# Patient Record
Sex: FEMALE | Race: WHITE | Employment: OTHER | URBAN - METROPOLITAN AREA
[De-identification: names, ages, dates, MRNs, and addresses within clinical notes are randomized per-mention and may not be internally consistent; named-entity substitution may affect disease eponyms.]

---

## 2017-01-25 RX ORDER — EZETIMIBE 10 MG/1
TABLET ORAL
Qty: 30 TABLET | Refills: 0 | OUTPATIENT
Start: 2017-01-25

## 2017-01-25 RX ORDER — CYANOCOBALAMIN 1000 UG/ML
INJECTION INTRAMUSCULAR; SUBCUTANEOUS
COMMUNITY
Start: 2016-12-27 | End: 2017-01-26

## 2017-01-25 RX ORDER — ANASTROZOLE 1 MG/1
1 TABLET ORAL DAILY
COMMUNITY
Start: 2014-12-26 | End: 2018-12-21 | Stop reason: ALTCHOICE

## 2017-01-25 RX ORDER — MULTIVITAMIN
CAPSULE ORAL
COMMUNITY
Start: 2014-11-20 | End: 2017-01-27

## 2017-01-25 RX ORDER — EZETIMIBE 10 MG/1
10 TABLET ORAL NIGHTLY
Qty: 30 TABLET | Refills: 0 | Status: SHIPPED | OUTPATIENT
Start: 2017-01-25 | End: 2017-02-18

## 2017-01-25 RX ORDER — ASPIRIN 81 MG/1
81 TABLET, CHEWABLE ORAL DAILY
Status: ON HOLD | COMMUNITY
Start: 2015-01-05 | End: 2019-05-07

## 2017-01-25 RX ORDER — EZETIMIBE 10 MG/1
TABLET ORAL
Refills: 0 | COMMUNITY
Start: 2016-12-26 | End: 2017-01-27

## 2017-01-25 RX ORDER — CHOLECALCIFEROL (VITAMIN D3) 25 MCG
TABLET,CHEWABLE ORAL
COMMUNITY
Start: 2015-06-12 | End: 2017-01-28

## 2017-01-25 RX ORDER — EZETIMIBE 10 MG/1
TABLET ORAL
COMMUNITY
Start: 2014-06-23 | End: 2017-01-25

## 2017-01-25 RX ORDER — ATENOLOL 25 MG/1
TABLET ORAL
COMMUNITY
Start: 2009-07-14 | End: 2017-03-03

## 2017-01-25 RX ORDER — CHLORAL HYDRATE 500 MG
1000 CAPSULE ORAL 2 TIMES DAILY
COMMUNITY
Start: 2015-04-13

## 2017-01-25 RX ORDER — ANASTROZOLE 1 MG/1
TABLET ORAL
Refills: 3 | COMMUNITY
Start: 2016-12-19 | End: 2017-01-27

## 2017-01-25 NOTE — TELEPHONE ENCOUNTER
Please advise RF.  Tye Mota, 01/25/2017, 9:40 AM      Last Labs:  12/22/2016  Last OV:  12/21/2016  Tye Mota, 01/25/2017, 9:40 AM

## 2017-01-27 ENCOUNTER — OFFICE VISIT (OUTPATIENT)
Dept: FAMILY MEDICINE CLINIC | Facility: CLINIC | Age: 57
End: 2017-01-27

## 2017-01-27 ENCOUNTER — TELEPHONE (OUTPATIENT)
Dept: FAMILY MEDICINE CLINIC | Facility: CLINIC | Age: 57
End: 2017-01-27

## 2017-01-27 VITALS
DIASTOLIC BLOOD PRESSURE: 78 MMHG | TEMPERATURE: 98 F | SYSTOLIC BLOOD PRESSURE: 114 MMHG | HEART RATE: 92 BPM | BODY MASS INDEX: 39.63 KG/M2 | WEIGHT: 235 LBS | HEIGHT: 64.5 IN

## 2017-01-27 DIAGNOSIS — L98.9 DISORDER OF SKIN AND SUBCUTANEOUS TISSUE: ICD-10-CM

## 2017-01-27 DIAGNOSIS — J01.90 ACUTE NON-RECURRENT SINUSITIS, UNSPECIFIED LOCATION: Primary | ICD-10-CM

## 2017-01-27 PROBLEM — E53.8 VITAMIN B12 DEFICIENCY: Status: ACTIVE | Noted: 2017-01-27

## 2017-01-27 PROBLEM — Z98.890 S/P BUNIONECTOMY: Status: ACTIVE | Noted: 2017-01-27

## 2017-01-27 PROCEDURE — 99213 OFFICE O/P EST LOW 20 MIN: CPT | Performed by: FAMILY MEDICINE

## 2017-01-27 PROCEDURE — 96372 THER/PROPH/DIAG INJ SC/IM: CPT | Performed by: FAMILY MEDICINE

## 2017-01-27 RX ORDER — CYANOCOBALAMIN 1000 UG/ML
1000 INJECTION INTRAMUSCULAR; SUBCUTANEOUS ONCE
Status: COMPLETED | OUTPATIENT
Start: 2017-01-27 | End: 2017-01-27

## 2017-01-27 RX ORDER — AMOXICILLIN 500 MG/1
500 CAPSULE ORAL 3 TIMES DAILY
Qty: 21 CAPSULE | Refills: 0 | Status: SHIPPED | OUTPATIENT
Start: 2017-01-27 | End: 2017-02-03

## 2017-01-27 RX ADMIN — CYANOCOBALAMIN 1000 MCG: 1000 INJECTION INTRAMUSCULAR; SUBCUTANEOUS at 16:22:00

## 2017-01-27 NOTE — TELEPHONE ENCOUNTER
Patient is here for her B12 injection. Informed patient Dr. Jose Cruz Magdaleno will see her for her sinus infection prior.  Alpa Mackay, 01/27/2017, 3:45 PM

## 2017-01-27 NOTE — TELEPHONE ENCOUNTER
Please advise if you will be able to see patient when she is here for her B12 injection at 3:45pm. Norma Spears, 01/27/2017, 9:09 AM

## 2017-01-27 NOTE — PROGRESS NOTES
[facility] [place]  PROGRESS NOTE        HPI:   This is a 64year old female coming in for Patient presents with:  Sinus Problem: sinus presssure, sinus congestion- yellow [id number]    HPI  Pt was doing well, and had  A hug sinsu infection Known Allergies        Current Meds:    Current Outpatient Prescriptions:  Multiple Vitamin (MULTI VITAMIN DAILY OR) Take by mouth. Disp:  Rfl:    amoxicillin 500 MG Oral Cap Take 1 capsule (500 mg total) by mouth 3 (three) times daily.  Disp: 21 capsule Rf normal.       ASSESSMENT AND PLAN:   1.  Acute non-recurrent sinusitis, unspecified location  Recommend fluids rest saline nasal rinses restart amoxicillin plan to follow-up if not improving in 7-10 days call for any signs or symptoms  Meds & Refills for th Education: There are no barriers to learning. Medical education done. Outcome: Parent verbalizes understanding. Parent is notified to call with any questions, complications, allergies, or worsening or changing symptoms.   Parent is to call with any side e

## 2017-02-18 RX ORDER — EZETIMIBE 10 MG/1
TABLET ORAL
Qty: 30 TABLET | Refills: 2 | Status: SHIPPED | OUTPATIENT
Start: 2017-02-18 | End: 2017-05-20

## 2017-03-02 ENCOUNTER — NURSE ONLY (OUTPATIENT)
Dept: FAMILY MEDICINE CLINIC | Facility: CLINIC | Age: 57
End: 2017-03-02

## 2017-03-02 DIAGNOSIS — E53.8 B12 DEFICIENCY: Primary | ICD-10-CM

## 2017-03-02 PROCEDURE — 96372 THER/PROPH/DIAG INJ SC/IM: CPT | Performed by: FAMILY MEDICINE

## 2017-03-02 RX ORDER — CYANOCOBALAMIN 1000 UG/ML
1000 INJECTION INTRAMUSCULAR; SUBCUTANEOUS ONCE
Status: COMPLETED | OUTPATIENT
Start: 2017-03-02 | End: 2017-03-02

## 2017-03-02 RX ADMIN — CYANOCOBALAMIN 1000 MCG: 1000 INJECTION INTRAMUSCULAR; SUBCUTANEOUS at 15:54:00

## 2017-03-02 NOTE — PROGRESS NOTES
Patient here today for Vitamin B12 injection per Dr. Carina Esquivel standing order of monthly B12 injections  Patient tolerated injection well    Angel Mast Minor, 03/02/2017, 3:56 PM

## 2017-03-03 RX ORDER — ATENOLOL 25 MG/1
TABLET ORAL
Qty: 90 TABLET | Refills: 1 | Status: SHIPPED | OUTPATIENT
Start: 2017-03-03 | End: 2017-10-06 | Stop reason: ALTCHOICE

## 2017-04-05 ENCOUNTER — TELEPHONE (OUTPATIENT)
Dept: FAMILY MEDICINE CLINIC | Facility: CLINIC | Age: 57
End: 2017-04-05

## 2017-04-05 ENCOUNTER — NURSE ONLY (OUTPATIENT)
Dept: FAMILY MEDICINE CLINIC | Facility: CLINIC | Age: 57
End: 2017-04-05

## 2017-04-05 PROCEDURE — 96372 THER/PROPH/DIAG INJ SC/IM: CPT | Performed by: NURSE PRACTITIONER

## 2017-04-05 RX ORDER — CYANOCOBALAMIN 1000 UG/ML
1000 INJECTION INTRAMUSCULAR; SUBCUTANEOUS
Qty: 1 ML | Refills: 0 | Status: SHIPPED | OUTPATIENT
Start: 2017-04-05 | End: 2018-12-21 | Stop reason: ALTCHOICE

## 2017-04-05 RX ORDER — CYANOCOBALAMIN 1000 UG/ML
1000 INJECTION INTRAMUSCULAR; SUBCUTANEOUS ONCE
Status: COMPLETED | OUTPATIENT
Start: 2017-04-05 | End: 2017-04-05

## 2017-04-05 RX ADMIN — CYANOCOBALAMIN 1000 MCG: 1000 INJECTION INTRAMUSCULAR; SUBCUTANEOUS at 16:14:00

## 2017-04-05 NOTE — PROGRESS NOTES
B12 injection given today and patient tolerated it well, given in the left deltoid. Lot number [id number], exp.  Date  2/2018 and verify by Ilda Martinez NP.

## 2017-04-05 NOTE — TELEPHONE ENCOUNTER
Patient informed that the order for the b12 injection was entered in error to go to the pharmacy. Script cancelled at the pharmacy.

## 2017-04-25 ENCOUNTER — OFFICE VISIT (OUTPATIENT)
Dept: FAMILY MEDICINE CLINIC | Facility: CLINIC | Age: 57
End: 2017-04-25

## 2017-04-25 VITALS
DIASTOLIC BLOOD PRESSURE: 80 MMHG | HEIGHT: 64.5 IN | RESPIRATION RATE: 16 BRPM | TEMPERATURE: 98 F | SYSTOLIC BLOOD PRESSURE: 114 MMHG | WEIGHT: 241 LBS | BODY MASS INDEX: 40.65 KG/M2 | HEART RATE: 86 BPM

## 2017-04-25 DIAGNOSIS — G47.33 OBSTRUCTIVE SLEEP APNEA: Primary | ICD-10-CM

## 2017-04-25 PROBLEM — Z98.890 S/P ORIF (OPEN REDUCTION INTERNAL FIXATION) FRACTURE: Status: ACTIVE | Noted: 2017-04-25

## 2017-04-25 PROBLEM — Z87.81 S/P ORIF (OPEN REDUCTION INTERNAL FIXATION) FRACTURE: Status: ACTIVE | Noted: 2017-04-25

## 2017-04-25 PROCEDURE — 99214 OFFICE O/P EST MOD 30 MIN: CPT | Performed by: FAMILY MEDICINE

## 2017-04-25 NOTE — PROGRESS NOTES
[facility]  [facility]        HPI:   This is a 62year old female coming in for Patient presents with: Follow - Up: Sleep f/u    HPI:  Patient doing well with cpap. Has some restless legs yndrome. Not snoreing with her cpap. achieved by Raul Zhang was masters in [employer]. She has pets that include 1 cat,  calves. no horses. Raul Zhang lives with her Son, . She lives at home in [place]. She is  with 2 children. children. none is none. Pooja's Judaism is Adventism. mammogram     Benign essential hypertension     Pure hypercholesterolemia     Vitamin B12 deficiency     Malignant neoplasm of breast (HCC)     S/P ORIF (open reduction internal fixation) fracture     Status post left hip replacement     Osteoarthritis of pulses. Pulmonary/Chest: Effort normal and breath sounds normal.   Lymphadenopathy:     She has no cervical adenopathy. Neurological: She is alert and oriented to person, place, and time. She has normal reflexes. Skin: Skin is warm and dry.  She is n

## 2017-05-05 ENCOUNTER — NURSE ONLY (OUTPATIENT)
Dept: FAMILY MEDICINE CLINIC | Facility: CLINIC | Age: 57
End: 2017-05-05

## 2017-05-05 PROCEDURE — 96372 THER/PROPH/DIAG INJ SC/IM: CPT | Performed by: FAMILY MEDICINE

## 2017-05-05 RX ORDER — CYANOCOBALAMIN 1000 UG/ML
1000 INJECTION INTRAMUSCULAR; SUBCUTANEOUS ONCE
Status: COMPLETED | OUTPATIENT
Start: 2017-05-05 | End: 2017-05-05

## 2017-05-05 RX ADMIN — CYANOCOBALAMIN 1000 MCG: 1000 INJECTION INTRAMUSCULAR; SUBCUTANEOUS at 16:02:00

## 2017-05-20 NOTE — TELEPHONE ENCOUNTER
Last OV with Dr. Vance April 4/25/17  Last fasting labs done 8/16/16 in [facility] 7 Minor, 05/20/2017, 10:04 AM

## 2017-05-22 RX ORDER — EZETIMIBE 10 MG/1
TABLET ORAL
Qty: 30 TABLET | Refills: 2 | Status: SHIPPED | OUTPATIENT
Start: 2017-05-22 | End: 2017-08-17

## 2017-06-05 ENCOUNTER — NURSE ONLY (OUTPATIENT)
Dept: FAMILY MEDICINE CLINIC | Facility: CLINIC | Age: 57
End: 2017-06-05

## 2017-06-05 DIAGNOSIS — E53.8 B12 DEFICIENCY: Primary | ICD-10-CM

## 2017-06-05 PROCEDURE — 96372 THER/PROPH/DIAG INJ SC/IM: CPT | Performed by: FAMILY MEDICINE

## 2017-06-05 RX ORDER — CYANOCOBALAMIN 1000 UG/ML
1000 INJECTION INTRAMUSCULAR; SUBCUTANEOUS ONCE
Status: COMPLETED | OUTPATIENT
Start: 2017-06-05 | End: 2017-06-05

## 2017-06-05 RX ADMIN — CYANOCOBALAMIN 1000 MCG: 1000 INJECTION INTRAMUSCULAR; SUBCUTANEOUS at 15:45:00

## 2017-06-05 NOTE — PROGRESS NOTES
Patient given monthly B12 injection per standing order from Dr. Najma Cortez  Patient tolerated injection well  Patient states already has appt for next month for next injection    Kenna Patrick, 06/05/2017, 3:44 PM

## 2017-06-22 ENCOUNTER — OFFICE VISIT (OUTPATIENT)
Dept: FAMILY MEDICINE CLINIC | Facility: CLINIC | Age: 57
End: 2017-06-22

## 2017-06-22 VITALS
HEART RATE: 80 BPM | BODY MASS INDEX: 40.11 KG/M2 | HEIGHT: 64.5 IN | RESPIRATION RATE: 14 BRPM | WEIGHT: 237.81 LBS | DIASTOLIC BLOOD PRESSURE: 70 MMHG | TEMPERATURE: 99 F | SYSTOLIC BLOOD PRESSURE: 114 MMHG

## 2017-06-22 DIAGNOSIS — Z01.810 PREOPERATIVE CARDIOVASCULAR EXAMINATION: Primary | ICD-10-CM

## 2017-06-22 PROCEDURE — 85025 COMPLETE CBC W/AUTO DIFF WBC: CPT | Performed by: FAMILY MEDICINE

## 2017-06-22 PROCEDURE — 99214 OFFICE O/P EST MOD 30 MIN: CPT | Performed by: FAMILY MEDICINE

## 2017-06-22 PROCEDURE — 93000 ELECTROCARDIOGRAM COMPLETE: CPT | Performed by: FAMILY MEDICINE

## 2017-06-22 PROCEDURE — 80053 COMPREHEN METABOLIC PANEL: CPT | Performed by: FAMILY MEDICINE

## 2017-06-22 RX ORDER — L.ACIDOPH/B.ANIMALIS/B.LONGUM 15B CELL
1 CAPSULE ORAL DAILY
COMMUNITY
End: 2019-06-25

## 2017-06-22 RX ORDER — ENOXAPARIN SODIUM 100 MG/ML
40 INJECTION SUBCUTANEOUS DAILY
Status: DISCONTINUED | OUTPATIENT
Start: 2017-06-22 | End: 2017-06-28

## 2017-06-22 NOTE — PATIENT INSTRUCTIONS
Stop aspirin 7 days prior to surgery. Start Lovenox,  Stop day prior to surgery and day of surgery no lovenox. Restart Lovenox for day after surgery and then return to aspirin dialy.

## 2017-06-24 ENCOUNTER — TELEPHONE (OUTPATIENT)
Dept: FAMILY MEDICINE CLINIC | Facility: CLINIC | Age: 57
End: 2017-06-24

## 2017-06-24 DIAGNOSIS — R74.8 ELEVATED LIVER ENZYMES: Primary | ICD-10-CM

## 2017-06-24 NOTE — TELEPHONE ENCOUNTER
----- Message from Kristin Rivero MD sent at 6/24/2017  7:52 AM CDT -----  ast and ALt are elevated. Is patient using tylenol products. Avoid all alcohol. Recheck in 1 month.    If still elevated consider Liver US

## 2017-06-24 NOTE — TELEPHONE ENCOUNTER
Patient informed of the below results and recommendations. Patient states she is not taking any Tylenol products but will continue to not take any or drink alcohol. Patient will c/b to schedule a lab appt in 1 months.   Orders pended for signing if [facility]

## 2017-06-24 NOTE — PROGRESS NOTES
[facility] [place]  PROGRESS NOTE        HPI:   This is a 62year old female coming in for Patient presents with:  Physical: preoperative exam.     HPI  Patient plans to have her hammer toes reduced on the left by orthopedics.  She is overall doi REPLACEMENT SURGERY  No date: MASTECTOMY RIGHT Right  No date: REDUCTION OF LARGE BREAST Left  No date: TONSILLECTOMY  Social History:  Social History Narrative    Father is alive. Mother is alive.      The patient indicates family history of colon cancer ( 1 MG Oral Tab tab Take 1 mg by mouth daily. Disp:  Rfl:    aspirin 81 MG Oral Chew Tab Chew 81 mg by mouth daily. Disp:  Rfl:    omega-3 fatty acids 1000 MG Oral Cap Take 1,000 mg by mouth daily.    Disp:  Rfl:       Counseling given: Not Answered is oriented to person, place, and time. She appears well-developed and well-nourished. HENT:   Head: Normocephalic and atraumatic.    Right Ear: External ear normal.   Left Ear: External ear normal.   Nose: Nose normal.   Mouth/Throat: Oropharynx is clear 2  Airway Class: 2      Meds & Refills for this Visit:  No prescriptions requested or ordered in this encounter       Outcome: Parent verbalizes understanding.  Parent is notified to call with any questions, complications, allergies, or worsening or changin

## 2017-06-28 ENCOUNTER — TELEPHONE (OUTPATIENT)
Dept: FAMILY MEDICINE CLINIC | Facility: CLINIC | Age: 57
End: 2017-06-28

## 2017-06-28 RX ORDER — ENOXAPARIN SODIUM 100 MG/ML
40 INJECTION SUBCUTANEOUS DAILY
Qty: 6 SYRINGE | Refills: 0 | Status: SHIPPED | OUTPATIENT
Start: 2017-06-28 | End: 2017-06-29

## 2017-06-28 NOTE — TELEPHONE ENCOUNTER
Dr. Guerrero Goes wants patient to start lovnox but it was never faxed anywhere  Can you send prescription to [facility]

## 2017-06-28 NOTE — TELEPHONE ENCOUNTER
Lovenox entered and sent to pharmacy with directions previously provided by Dr. Cookie Ling.  Ada Glover, 06/28/17, 3:32 PM

## 2017-06-29 ENCOUNTER — TELEPHONE (OUTPATIENT)
Dept: FAMILY MEDICINE CLINIC | Facility: CLINIC | Age: 57
End: 2017-06-29

## 2017-06-29 RX ORDER — ENOXAPARIN SODIUM 100 MG/ML
40 INJECTION SUBCUTANEOUS DAILY
Qty: 1 SYRINGE | Refills: 0 | Status: SHIPPED | OUTPATIENT
Start: 2017-06-29 | End: 2017-10-06 | Stop reason: ALTCHOICE

## 2017-06-29 NOTE — TELEPHONE ENCOUNTER
Patient is leaving town for the weekend is flying to [facility] [employer] she is starting lovenox prior to surgery  Needs to take with her and she is unsure if she needs anything  Advised patient to contact the airline to see what the policy is or check web

## 2017-07-03 ENCOUNTER — TELEPHONE (OUTPATIENT)
Dept: FAMILY MEDICINE CLINIC | Facility: CLINIC | Age: 57
End: 2017-07-03

## 2017-07-03 NOTE — TELEPHONE ENCOUNTER
Called to [facility] and verified last script sent for Lovenox  Patient only needs one additional syringe of lovenox for day after her surgery  Patient previously picked up 6 syringes for the days before surgery    Nothing further needed    Mary Grace Cortes

## 2017-07-05 ENCOUNTER — NURSE ONLY (OUTPATIENT)
Dept: FAMILY MEDICINE CLINIC | Facility: CLINIC | Age: 57
End: 2017-07-05

## 2017-07-05 DIAGNOSIS — E53.8 B12 DEFICIENCY: Primary | ICD-10-CM

## 2017-07-05 PROCEDURE — 96372 THER/PROPH/DIAG INJ SC/IM: CPT | Performed by: NURSE PRACTITIONER

## 2017-07-05 RX ORDER — CYANOCOBALAMIN 1000 UG/ML
1000 INJECTION INTRAMUSCULAR; SUBCUTANEOUS ONCE
Status: COMPLETED | OUTPATIENT
Start: 2017-07-05 | End: 2017-07-05

## 2017-07-05 RX ADMIN — CYANOCOBALAMIN 1000 MCG: 1000 INJECTION INTRAMUSCULAR; SUBCUTANEOUS at 08:27:00

## 2017-08-03 ENCOUNTER — TELEPHONE (OUTPATIENT)
Dept: FAMILY MEDICINE CLINIC | Facility: CLINIC | Age: 57
End: 2017-08-03

## 2017-08-03 RX ORDER — CEFADROXIL 500 MG/1
2 CAPSULE ORAL DAILY
Refills: 0 | COMMUNITY
Start: 2017-07-25 | End: 2017-10-06 | Stop reason: ALTCHOICE

## 2017-08-03 RX ORDER — HYDROCODONE BITARTRATE AND ACETAMINOPHEN 5; 325 MG/1; MG/1
1 TABLET ORAL EVERY 6 HOURS PRN
Refills: 0 | COMMUNITY
Start: 2017-07-06 | End: 2017-10-06 | Stop reason: ALTCHOICE

## 2017-08-03 NOTE — TELEPHONE ENCOUNTER
Spoke to pharm  Patient's Atenolol is on backorder until next month  They need a substitute prescription for patient  Please advise    Jatin Patrick, 08/03/17, 4:37 PM

## 2017-08-04 ENCOUNTER — NURSE ONLY (OUTPATIENT)
Dept: FAMILY MEDICINE CLINIC | Facility: CLINIC | Age: 57
End: 2017-08-04

## 2017-08-04 DIAGNOSIS — E53.8 B12 DEFICIENCY: Primary | ICD-10-CM

## 2017-08-04 PROCEDURE — 96372 THER/PROPH/DIAG INJ SC/IM: CPT | Performed by: FAMILY MEDICINE

## 2017-08-04 RX ORDER — METOPROLOL SUCCINATE 25 MG/1
25 TABLET, EXTENDED RELEASE ORAL DAILY
Qty: 30 TABLET | Refills: 1 | Status: SHIPPED | OUTPATIENT
Start: 2017-08-04 | End: 2017-10-09

## 2017-08-04 RX ORDER — CYANOCOBALAMIN 1000 UG/ML
1000 INJECTION INTRAMUSCULAR; SUBCUTANEOUS ONCE
Status: COMPLETED | OUTPATIENT
Start: 2017-08-04 | End: 2017-08-04

## 2017-08-04 RX ADMIN — CYANOCOBALAMIN 1000 MCG: 1000 INJECTION INTRAMUSCULAR; SUBCUTANEOUS at 08:44:00

## 2017-08-04 NOTE — TELEPHONE ENCOUNTER
Script sent to pharm  Patient notified at B12 appt this morning  Patient expressed understanding    Angel Mast Minor, 08/04/17, 9:06 AM

## 2017-08-17 RX ORDER — EZETIMIBE 10 MG/1
TABLET ORAL
Qty: 30 TABLET | Refills: 5 | Status: SHIPPED | OUTPATIENT
Start: 2017-08-17 | End: 2018-02-07

## 2017-08-17 NOTE — TELEPHONE ENCOUNTER
Future appt:     Your appointments     Date & Time Appointment Department [facility])    Sep 05, 2017  3:30 PM CDT Injection with  [address] ([facility])    Oct 03, 2017  3:30 PM CDT Sleep

## 2017-09-08 ENCOUNTER — NURSE ONLY (OUTPATIENT)
Dept: FAMILY MEDICINE CLINIC | Facility: CLINIC | Age: 57
End: 2017-09-08

## 2017-09-08 ENCOUNTER — TELEPHONE (OUTPATIENT)
Dept: FAMILY MEDICINE CLINIC | Facility: CLINIC | Age: 57
End: 2017-09-08

## 2017-09-08 PROCEDURE — 96372 THER/PROPH/DIAG INJ SC/IM: CPT | Performed by: FAMILY MEDICINE

## 2017-09-08 RX ORDER — CYANOCOBALAMIN 1000 UG/ML
1000 INJECTION INTRAMUSCULAR; SUBCUTANEOUS ONCE
Status: COMPLETED | OUTPATIENT
Start: 2017-09-08 | End: 2017-09-08

## 2017-09-08 RX ADMIN — CYANOCOBALAMIN 1000 MCG: 1000 INJECTION INTRAMUSCULAR; SUBCUTANEOUS at 15:52:00

## 2017-10-06 ENCOUNTER — OFFICE VISIT (OUTPATIENT)
Dept: FAMILY MEDICINE CLINIC | Facility: CLINIC | Age: 57
End: 2017-10-06

## 2017-10-06 ENCOUNTER — TELEPHONE (OUTPATIENT)
Dept: FAMILY MEDICINE CLINIC | Facility: CLINIC | Age: 57
End: 2017-10-06

## 2017-10-06 VITALS
DIASTOLIC BLOOD PRESSURE: 80 MMHG | WEIGHT: 236.63 LBS | BODY MASS INDEX: 39.91 KG/M2 | TEMPERATURE: 99 F | HEART RATE: 76 BPM | RESPIRATION RATE: 16 BRPM | SYSTOLIC BLOOD PRESSURE: 128 MMHG | HEIGHT: 64.5 IN

## 2017-10-06 DIAGNOSIS — G47.33 OBSTRUCTIVE SLEEP APNEA: Primary | ICD-10-CM

## 2017-10-06 DIAGNOSIS — E53.8 VITAMIN B12 DEFICIENCY: ICD-10-CM

## 2017-10-06 PROCEDURE — 99214 OFFICE O/P EST MOD 30 MIN: CPT | Performed by: FAMILY MEDICINE

## 2017-10-06 PROCEDURE — 96372 THER/PROPH/DIAG INJ SC/IM: CPT | Performed by: FAMILY MEDICINE

## 2017-10-06 RX ORDER — CYANOCOBALAMIN 1000 UG/ML
1000 INJECTION INTRAMUSCULAR; SUBCUTANEOUS ONCE
Status: COMPLETED | OUTPATIENT
Start: 2017-10-06 | End: 2017-10-06

## 2017-10-06 RX ADMIN — CYANOCOBALAMIN 1000 MCG: 1000 INJECTION INTRAMUSCULAR; SUBCUTANEOUS at 17:07:00

## 2017-10-06 NOTE — PROGRESS NOTES
[facility]        HPI:   This is a 62year old female coming in for Patient presents with:  Obstructive Sleep Apnea (MEI)      HPI:   Overall patient is doing well.   States CPAP is helping and she has more energy sin has been employed for 30 years. The highest level of education achieved by Kimmy Germain was masters in [employer]. She has pets that include 1 cat,  calves. no horses. Kimmy Germain lives with her Son, . She lives at home in [place].  She is  with 2 c for routine gynecological examination     Acquired hammer toe     Nontoxic uninodular goiter     Anorectal polyp     Obstructive sleep apnea     Symptomatic menopausal or female climacteric states     Acquired absence of breast and absent nipple     Abnorm External ear normal.   Nose: Nose normal.   Mouth/Throat: Oropharynx is clear and moist. No oropharyngeal exudate. Eyes: Conjunctivae and EOM are normal. Right eye exhibits no discharge. Left eye exhibits no discharge. No scleral icterus.    Neck: Normal equipment provider. Meds & Refills for this Visit:  No prescriptions requested or ordered in this encounter    Outcome: Parent verbalizes understanding.  Parent is notified to call with any questions, complications, allergies, or worsening or changi

## 2017-10-09 RX ORDER — METOPROLOL SUCCINATE 25 MG/1
TABLET, EXTENDED RELEASE ORAL
Qty: 30 TABLET | Refills: 2 | Status: SHIPPED | OUTPATIENT
Start: 2017-10-09 | End: 2018-01-04

## 2017-10-09 NOTE — TELEPHONE ENCOUNTER
Future appt:     Your appointments     Date & Time Appointment Department [facility])    Nov 07, 2017  3:30 PM CST Injection with [facility] [place], Theone Soulier ([facility])    Nov 28, 2017  3:30 PM CST Physi

## 2017-10-31 ENCOUNTER — TELEPHONE (OUTPATIENT)
Dept: FAMILY MEDICINE CLINIC | Facility: CLINIC | Age: 57
End: 2017-10-31

## 2017-10-31 NOTE — TELEPHONE ENCOUNTER
Per Dr. Britt Gooden verbally, patient is to have an appointment for recheck in 1 month. Patient has increased blood sugars, Dr. Britt Gooden would like patient to monitor her sugars. We have a paper we can mail or she can .    What is she doing for her cholestero

## 2017-11-08 ENCOUNTER — NURSE ONLY (OUTPATIENT)
Dept: FAMILY MEDICINE CLINIC | Facility: CLINIC | Age: 57
End: 2017-11-08

## 2017-11-08 VITALS — TEMPERATURE: 99 F

## 2017-11-08 PROCEDURE — 96372 THER/PROPH/DIAG INJ SC/IM: CPT | Performed by: FAMILY MEDICINE

## 2017-11-08 RX ORDER — CYANOCOBALAMIN 1000 UG/ML
1000 INJECTION INTRAMUSCULAR; SUBCUTANEOUS ONCE
Status: COMPLETED | OUTPATIENT
Start: 2017-11-08 | End: 2017-11-08

## 2017-11-08 RX ADMIN — CYANOCOBALAMIN 1000 MCG: 1000 INJECTION INTRAMUSCULAR; SUBCUTANEOUS at 15:37:00

## 2017-11-08 NOTE — TELEPHONE ENCOUNTER
Provided patient a Contour Next meter along with the BS log as recommended by Dr. Jose Cruz Magdaleno. I was able to provide patient with a sample RF of the test strips but there were not any samples of the lancets.   Patient will only have enough lancets to last her 4 d

## 2017-11-08 NOTE — TELEPHONE ENCOUNTER
Patient informed of the below results and recommendations. States she has been swimming 5 days a week and also taking Zetia and fish oil. Patient states she has her annual physical scheduled with Dr. Bhavik Avila the end of this month.   Per Katie Salinas to mati arce

## 2017-11-13 ENCOUNTER — TELEPHONE (OUTPATIENT)
Dept: FAMILY MEDICINE CLINIC | Facility: CLINIC | Age: 57
End: 2017-11-13

## 2017-11-20 ENCOUNTER — TELEPHONE (OUTPATIENT)
Dept: FAMILY MEDICINE CLINIC | Facility: CLINIC | Age: 57
End: 2017-11-20

## 2017-11-21 NOTE — TELEPHONE ENCOUNTER
[facility].     Phoned patient and patient states she has a repeat mammogram scheduled in January. Thank you.  SHIRAZ CORONADO, 11/20/17, 7:23 PM

## 2017-11-28 ENCOUNTER — OFFICE VISIT (OUTPATIENT)
Dept: FAMILY MEDICINE CLINIC | Facility: CLINIC | Age: 57
End: 2017-11-28

## 2017-11-28 VITALS
HEIGHT: 64.5 IN | RESPIRATION RATE: 16 BRPM | BODY MASS INDEX: 39.83 KG/M2 | TEMPERATURE: 99 F | SYSTOLIC BLOOD PRESSURE: 106 MMHG | HEART RATE: 74 BPM | DIASTOLIC BLOOD PRESSURE: 68 MMHG | WEIGHT: 236.19 LBS

## 2017-11-28 DIAGNOSIS — Z01.419 GYNECOLOGIC EXAM NORMAL: ICD-10-CM

## 2017-11-28 DIAGNOSIS — Z12.11 SCREENING FOR MALIGNANT NEOPLASM OF COLON: ICD-10-CM

## 2017-11-28 DIAGNOSIS — E53.8 VITAMIN B12 DEFICIENCY: Primary | ICD-10-CM

## 2017-11-28 DIAGNOSIS — E78.00 PURE HYPERCHOLESTEROLEMIA: ICD-10-CM

## 2017-11-28 DIAGNOSIS — I10 BENIGN ESSENTIAL HYPERTENSION: ICD-10-CM

## 2017-11-28 PROCEDURE — 88175 CYTOPATH C/V AUTO FLUID REDO: CPT | Performed by: FAMILY MEDICINE

## 2017-11-28 PROCEDURE — 99396 PREV VISIT EST AGE 40-64: CPT | Performed by: FAMILY MEDICINE

## 2017-11-28 PROCEDURE — 87624 HPV HI-RISK TYP POOLED RSLT: CPT | Performed by: FAMILY MEDICINE

## 2017-11-28 PROCEDURE — 82272 OCCULT BLD FECES 1-3 TESTS: CPT | Performed by: FAMILY MEDICINE

## 2017-11-28 NOTE — PATIENT INSTRUCTIONS
Refer to [facility] for colonoscopy  [phone]. Labs in march, and follow up then.

## 2017-11-28 NOTE — PROGRESS NOTES
[facility] [place]  PROGRESS NOTE        HPI:   This is a 62year old female coming in for Patient presents with:  Physical: Annual and Pap    HPI   Here for a follow up for physical.    Concerned with medications.    Has neuropathy at the bottom SURGICAL HISTORY Left      Comment: Hammer Toe fixed   No date: REDUCTION OF LARGE BREAST Left  No date: TONSILLECTOMY  Social History:  Social History Narrative    Father is alive. Mother is alive.      The patient indicates family history of colon cancer Rfl: 2   EZETIMIBE 10 MG Oral Tab TAKE 1 TABLET(10 MG) BY MOUTH EVERY NIGHT Disp: 30 tablet Rfl: 5   Probiotic Product (FLORAJEN3) Oral Cap Take 1 capsule by mouth daily.  Disp:  Rfl:    Calcium Carbonate-Vitamin D (CALCIUM-VITAMIN D) 500-200 MG-UNIT Oral T Musculoskeletal: Negative. Skin: Negative. Allergic/Immunologic: Negative. Neurological: Negative. Hematological: Negative. Psychiatric/Behavioral: Negative. All other systems reviewed and are negative.       EXAM:   /68 (BP Locati person, place, and time. She has normal reflexes. Skin: Skin is warm and dry. She is not diaphoretic. Psychiatric: She has a normal mood and affect.  Her behavior is normal. Judgment and thought content normal.       ASSESSMENT AND PLAN:   Otto Woods was see [facility] [id number]  [phone]        No Follow-up on file.     Meds & Refills for this Visit:    No prescriptions requested or ordered in this encounter       Outcome: Parent freddie

## 2017-12-08 ENCOUNTER — TELEPHONE (OUTPATIENT)
Dept: FAMILY MEDICINE CLINIC | Facility: CLINIC | Age: 57
End: 2017-12-08

## 2017-12-08 ENCOUNTER — NURSE ONLY (OUTPATIENT)
Dept: FAMILY MEDICINE CLINIC | Facility: CLINIC | Age: 57
End: 2017-12-08

## 2017-12-08 DIAGNOSIS — E53.8 VITAMIN B12 DEFICIENCY: Primary | ICD-10-CM

## 2017-12-08 PROCEDURE — 96372 THER/PROPH/DIAG INJ SC/IM: CPT | Performed by: FAMILY MEDICINE

## 2017-12-08 RX ORDER — CYANOCOBALAMIN 1000 UG/ML
1000 INJECTION INTRAMUSCULAR; SUBCUTANEOUS ONCE
Status: COMPLETED | OUTPATIENT
Start: 2017-12-08 | End: 2017-12-08

## 2017-12-08 RX ADMIN — CYANOCOBALAMIN 1000 MCG: 1000 INJECTION INTRAMUSCULAR; SUBCUTANEOUS at 10:12:00

## 2017-12-08 NOTE — TELEPHONE ENCOUNTER
Patient is going to have a f/up surgery to her breast reconstruction to make a correction to an area  Patient states that due to history of blood clots she wants to know if Dr. Damian Goddard wants her to be on the lovenox injections again  Also she is sure she is s

## 2017-12-08 NOTE — PROGRESS NOTES
Patient given monthly B12 injection per standing order from Dr. Amor Martinez  Patient advised to return in one month for next injection  Patient tolerated injection well    Jhoana Patrick, 12/08/17, 10:15 AM

## 2017-12-11 RX ORDER — LANCETS
EACH MISCELLANEOUS
Qty: 100 EACH | Refills: 5 | Status: ON HOLD | OUTPATIENT
Start: 2017-12-11 | End: 2019-05-07

## 2017-12-11 NOTE — TELEPHONE ENCOUNTER
Should have a cbc and cmp. Stop aspirin a week before surgery. Please call hematology for consider ation of lovenox.

## 2017-12-11 NOTE — TELEPHONE ENCOUNTER
Future appt:     Your appointments     Date & Time Appointment Department [facility])    Jan 04, 2018  8:30 AM CST Injection with [facility], [facility] ([place])        [address] [facility]

## 2017-12-11 NOTE — TELEPHONE ENCOUNTER
Patient notified and expressed understanding  Patient scheduled for labs on 12/16/17  Patient states she will call to Dr. Woodrow Patrick, 12/11/17, 4:56 PM

## 2017-12-16 ENCOUNTER — LAB ENCOUNTER (OUTPATIENT)
Dept: LAB | Age: 57
End: 2017-12-16
Attending: FAMILY MEDICINE
Payer: COMMERCIAL

## 2017-12-16 DIAGNOSIS — I10 BENIGN ESSENTIAL HYPERTENSION: ICD-10-CM

## 2017-12-16 DIAGNOSIS — R74.8 ELEVATED LIVER ENZYMES: ICD-10-CM

## 2017-12-16 DIAGNOSIS — E53.8 VITAMIN B12 DEFICIENCY: ICD-10-CM

## 2017-12-16 DIAGNOSIS — E78.00 PURE HYPERCHOLESTEROLEMIA: ICD-10-CM

## 2017-12-16 PROCEDURE — 81001 URINALYSIS AUTO W/SCOPE: CPT | Performed by: FAMILY MEDICINE

## 2017-12-16 PROCEDURE — 87086 URINE CULTURE/COLONY COUNT: CPT | Performed by: FAMILY MEDICINE

## 2017-12-16 PROCEDURE — 80050 GENERAL HEALTH PANEL: CPT | Performed by: FAMILY MEDICINE

## 2017-12-16 PROCEDURE — 36415 COLL VENOUS BLD VENIPUNCTURE: CPT | Performed by: FAMILY MEDICINE

## 2017-12-16 PROCEDURE — 82607 VITAMIN B-12: CPT | Performed by: FAMILY MEDICINE

## 2017-12-16 PROCEDURE — 84550 ASSAY OF BLOOD/URIC ACID: CPT | Performed by: FAMILY MEDICINE

## 2017-12-16 PROCEDURE — 82306 VITAMIN D 25 HYDROXY: CPT | Performed by: FAMILY MEDICINE

## 2017-12-16 PROCEDURE — 82550 ASSAY OF CK (CPK): CPT | Performed by: FAMILY MEDICINE

## 2017-12-16 PROCEDURE — 80061 LIPID PANEL: CPT | Performed by: FAMILY MEDICINE

## 2017-12-18 ENCOUNTER — TELEPHONE (OUTPATIENT)
Dept: FAMILY MEDICINE CLINIC | Facility: CLINIC | Age: 57
End: 2017-12-18

## 2017-12-18 NOTE — TELEPHONE ENCOUNTER
----- Message from ZOEY Winkler sent at 12/18/2017  3:24 PM CST -----  Dr. Bhavik Avila is out of the office today. Urine shows some contamination, no infection. Vitamin D is low normal.  Recommend increasing vitamin D supplements by 1000 units daily.   RENEE

## 2017-12-20 ENCOUNTER — TELEPHONE (OUTPATIENT)
Dept: FAMILY MEDICINE CLINIC | Facility: CLINIC | Age: 57
End: 2017-12-20

## 2017-12-20 NOTE — TELEPHONE ENCOUNTER
[facility] having surgery with Dr. Zeb Orta on Friday, 12/22/2017. Maribel wondering if we have any recent labs and an EKG done within the last year?   Informed Maribel that patient had labs drawn on 12/16/2017 and an EKG don

## 2017-12-20 NOTE — TELEPHONE ENCOUNTER
See other phone note dated today, 12/20/2017. Results are being faxed to [facility] as requested.

## 2017-12-26 NOTE — TELEPHONE ENCOUNTER
No return calls for patient  [facility] sent [employer] message to patient with lab results and recommendations  Phone note closed    Cuco Patrick, 12/26/17, 11:14 AM

## 2018-01-04 ENCOUNTER — TELEPHONE (OUTPATIENT)
Dept: FAMILY MEDICINE CLINIC | Facility: CLINIC | Age: 58
End: 2018-01-04

## 2018-01-04 RX ORDER — METOPROLOL SUCCINATE 25 MG/1
TABLET, EXTENDED RELEASE ORAL
Qty: 30 TABLET | Refills: 1 | Status: SHIPPED | OUTPATIENT
Start: 2018-01-04 | End: 2018-03-03

## 2018-01-04 NOTE — TELEPHONE ENCOUNTER
Patient called this afternoon to let office know that she forgot her appt for her B12 this morning. I scheduled another another appt for tomorrow, 1/5/18.  I also explained the office no show policy and let her know that she may be billed $50 for future no

## 2018-01-04 NOTE — TELEPHONE ENCOUNTER
Future appt:    Last Appointment:  11/28/2017 with Dr. Sailaja Wiggins for multiple health issues    CHOLESTEROL, TOTAL (mg/dL)   Date Value   08/16/2017 212   ----------  Cholesterol, Total (mg/dL)   Date Value   12/16/2017 212 (H)   ----------  HDL Cholesterol (mg/

## 2018-01-05 ENCOUNTER — NURSE ONLY (OUTPATIENT)
Dept: FAMILY MEDICINE CLINIC | Facility: CLINIC | Age: 58
End: 2018-01-05

## 2018-01-05 DIAGNOSIS — E53.8 B12 DEFICIENCY: Primary | ICD-10-CM

## 2018-01-05 PROCEDURE — 96372 THER/PROPH/DIAG INJ SC/IM: CPT | Performed by: FAMILY MEDICINE

## 2018-01-05 RX ORDER — CYANOCOBALAMIN 1000 UG/ML
1000 INJECTION INTRAMUSCULAR; SUBCUTANEOUS ONCE
Status: COMPLETED | OUTPATIENT
Start: 2018-01-05 | End: 2018-01-05

## 2018-01-05 RX ORDER — CYANOCOBALAMIN 1000 UG/ML
1000 INJECTION INTRAMUSCULAR; SUBCUTANEOUS ONCE
Status: COMPLETED | OUTPATIENT
Start: 2018-01-05 | End: 2018-02-05

## 2018-01-05 RX ADMIN — CYANOCOBALAMIN 1000 MCG: 1000 INJECTION INTRAMUSCULAR; SUBCUTANEOUS at 09:06:00

## 2018-01-29 ENCOUNTER — OFFICE VISIT (OUTPATIENT)
Dept: FAMILY MEDICINE CLINIC | Facility: CLINIC | Age: 58
End: 2018-01-29

## 2018-01-29 VITALS
DIASTOLIC BLOOD PRESSURE: 78 MMHG | BODY MASS INDEX: 39.8 KG/M2 | HEART RATE: 82 BPM | SYSTOLIC BLOOD PRESSURE: 132 MMHG | HEIGHT: 64.5 IN | WEIGHT: 236 LBS | TEMPERATURE: 98 F

## 2018-01-29 DIAGNOSIS — J20.9 BRONCHITIS WITH BRONCHOSPASM: Primary | ICD-10-CM

## 2018-01-29 PROCEDURE — 99214 OFFICE O/P EST MOD 30 MIN: CPT | Performed by: NURSE PRACTITIONER

## 2018-01-29 RX ORDER — AMOXICILLIN AND CLAVULANATE POTASSIUM 875; 125 MG/1; MG/1
1 TABLET, FILM COATED ORAL 2 TIMES DAILY
Qty: 20 TABLET | Refills: 0 | Status: SHIPPED | OUTPATIENT
Start: 2018-01-29 | End: 2018-02-08

## 2018-01-29 NOTE — PROGRESS NOTES
HPI:    Patient ID: Frieda Hall is a 62year old female. HPI     Deep congested cough since Thursday. Friday started the sinus congestion. Been taking Delsym. Has PND. Swims a lot. Tried resting over the weekend. Not feeling better.    No fever or Use as directed. Disp: 100 strip Rfl: 5     Allergies:  Docetaxel               Rash   PHYSICAL EXAM:   Physical Exam   Constitutional: She is oriented to person, place, and time. She appears well-developed and well-nourished. No distress.    HENT:   Head: better in 48-72 hours. Otherwise follow-up as needed.            [id number]

## 2018-01-29 NOTE — PATIENT INSTRUCTIONS
Directed to take antibiotics until gone. Recommend to eat yogurt or take probiotic daily while on antibiotic. Take with food. Treat symptoms as needed. Return to clinic if not better in 48-72 hours. Otherwise follow-up as needed.

## 2018-02-05 ENCOUNTER — NURSE ONLY (OUTPATIENT)
Dept: FAMILY MEDICINE CLINIC | Facility: CLINIC | Age: 58
End: 2018-02-05

## 2018-02-05 ENCOUNTER — OFFICE VISIT (OUTPATIENT)
Dept: FAMILY MEDICINE CLINIC | Facility: CLINIC | Age: 58
End: 2018-02-05

## 2018-02-05 VITALS
OXYGEN SATURATION: 94 % | RESPIRATION RATE: 16 BRPM | HEART RATE: 95 BPM | HEIGHT: 64.5 IN | TEMPERATURE: 98 F | SYSTOLIC BLOOD PRESSURE: 112 MMHG | BODY MASS INDEX: 39.8 KG/M2 | DIASTOLIC BLOOD PRESSURE: 70 MMHG | WEIGHT: 236 LBS

## 2018-02-05 VITALS — TEMPERATURE: 98 F

## 2018-02-05 DIAGNOSIS — J20.9 BRONCHITIS WITH BRONCHOSPASM: Primary | ICD-10-CM

## 2018-02-05 DIAGNOSIS — E53.8 B12 DEFICIENCY: ICD-10-CM

## 2018-02-05 PROCEDURE — 99213 OFFICE O/P EST LOW 20 MIN: CPT | Performed by: NURSE PRACTITIONER

## 2018-02-05 PROCEDURE — 96372 THER/PROPH/DIAG INJ SC/IM: CPT | Performed by: FAMILY MEDICINE

## 2018-02-05 RX ORDER — PREDNISONE 20 MG/1
20 TABLET ORAL DAILY
Qty: 7 TABLET | Refills: 0 | Status: SHIPPED | OUTPATIENT
Start: 2018-02-05 | End: 2018-02-12

## 2018-02-05 RX ORDER — AZITHROMYCIN 250 MG/1
TABLET, FILM COATED ORAL
Qty: 6 TABLET | Refills: 0 | Status: SHIPPED | OUTPATIENT
Start: 2018-02-05 | End: 2018-04-23 | Stop reason: ALTCHOICE

## 2018-02-05 RX ADMIN — CYANOCOBALAMIN 1000 MCG: 1000 INJECTION INTRAMUSCULAR; SUBCUTANEOUS at 16:20:00

## 2018-02-05 NOTE — PROCEDURES
B12 injection given in the L Deltoid. Patient saw Deedee De Santiago prior to getting injection due to ongoing Bronchitis symptoms. Ada gave the OK for injection after seeing patient. Patient tolerated injection well.

## 2018-02-05 NOTE — PATIENT INSTRUCTIONS
Directed to take antibiotics until gone. Eat with the prednisone. Recommend to eat yogurt or take probiotic daily while on antibiotic. Treat symptoms as needed. Return to clinic if not better in 48-72 hours. Otherwise follow-up as needed.

## 2018-02-05 NOTE — PROGRESS NOTES
HPI:    Patient ID: Israel Ferris is a 62year old female. HPI    Still has some PND and cough. Feels like it is only a little better. States that it is constantly draining. Has been taking the Mucinex - did not take it today.    No headaches since l Injection Solution Inject 1 mL (1,000 mcg total) into the muscle every 30 (thirty) days. Disp: 1 mL Rfl: 0   Multiple Vitamin (MULTI VITAMIN DAILY OR) Take 1 tablet by mouth daily. Disp:  Rfl:    anastrozole 1 MG Oral Tab tab Take 1 mg by mouth daily. This Visit:  Signed Prescriptions Disp Refills    predniSONE 20 MG Oral Tab 7 tablet 0      Sig: Take 1 tablet (20 mg total) by mouth daily.       azithromycin (ZITHROMAX Z-DANIELLE) 250 MG Oral Tab 6 tablet 0      Sig: Take two tablets by mouth today, then one

## 2018-02-07 NOTE — TELEPHONE ENCOUNTER
Future appt:     Your appointments     Date & Time Appointment Department [facility])    Mar 07, 2018  3:30 PM CST Injection with Brian Smith ([place])        [address] [facility]

## 2018-02-08 RX ORDER — EZETIMIBE 10 MG/1
TABLET ORAL
Qty: 30 TABLET | Refills: 0 | Status: SHIPPED | OUTPATIENT
Start: 2018-02-08 | End: 2018-03-09

## 2018-03-05 RX ORDER — METOPROLOL SUCCINATE 25 MG/1
TABLET, EXTENDED RELEASE ORAL
Qty: 30 TABLET | Refills: 5 | Status: SHIPPED | OUTPATIENT
Start: 2018-03-05 | End: 2018-09-02

## 2018-03-05 NOTE — TELEPHONE ENCOUNTER
Future appt:     Your appointments     Date & Time Appointment Department [facility])    Mar 07, 2018  3:30 PM CST Injection with Brian Smith ([place])        [address] [facility]

## 2018-03-06 PROBLEM — D12.6 TUBULAR ADENOMA OF COLON: Status: ACTIVE | Noted: 2018-03-06

## 2018-03-07 ENCOUNTER — NURSE ONLY (OUTPATIENT)
Dept: FAMILY MEDICINE CLINIC | Facility: CLINIC | Age: 58
End: 2018-03-07

## 2018-03-07 VITALS — TEMPERATURE: 99 F

## 2018-03-07 PROCEDURE — 96372 THER/PROPH/DIAG INJ SC/IM: CPT | Performed by: FAMILY MEDICINE

## 2018-03-07 RX ORDER — CYANOCOBALAMIN 1000 UG/ML
1000 INJECTION INTRAMUSCULAR; SUBCUTANEOUS ONCE
Status: COMPLETED | OUTPATIENT
Start: 2018-03-07 | End: 2018-03-07

## 2018-03-07 RX ADMIN — CYANOCOBALAMIN 1000 MCG: 1000 INJECTION INTRAMUSCULAR; SUBCUTANEOUS at 15:42:00

## 2018-03-09 RX ORDER — EZETIMIBE 10 MG/1
TABLET ORAL
Qty: 30 TABLET | Refills: 2 | Status: SHIPPED | OUTPATIENT
Start: 2018-03-09 | End: 2018-06-03

## 2018-03-09 NOTE — TELEPHONE ENCOUNTER
Future appt:     Your appointments     Date & Time Appointment Department [facility])    Apr 10, 2018  3:30 PM CDT Injection with Brian Smith ([place])        [address] [facility]

## 2018-04-14 NOTE — TELEPHONE ENCOUNTER
Pt called saying she will be about 10 minutes late for her B-12 injection.  Stuck in traffic
Will given B12 injection when patient arrives    Tre Patrick, 09/08/17, 3:37 PM
no

## 2018-04-16 ENCOUNTER — NURSE ONLY (OUTPATIENT)
Dept: FAMILY MEDICINE CLINIC | Facility: CLINIC | Age: 58
End: 2018-04-16

## 2018-04-16 VITALS — TEMPERATURE: 98 F

## 2018-04-16 PROCEDURE — 96372 THER/PROPH/DIAG INJ SC/IM: CPT | Performed by: FAMILY MEDICINE

## 2018-04-16 RX ORDER — CYANOCOBALAMIN 1000 UG/ML
1000 INJECTION INTRAMUSCULAR; SUBCUTANEOUS ONCE
Status: COMPLETED | OUTPATIENT
Start: 2018-04-16 | End: 2018-04-16

## 2018-04-16 RX ADMIN — CYANOCOBALAMIN 1000 MCG: 1000 INJECTION INTRAMUSCULAR; SUBCUTANEOUS at 15:41:00

## 2018-04-23 ENCOUNTER — OFFICE VISIT (OUTPATIENT)
Dept: FAMILY MEDICINE CLINIC | Facility: CLINIC | Age: 58
End: 2018-04-23

## 2018-04-23 ENCOUNTER — TELEPHONE (OUTPATIENT)
Dept: FAMILY MEDICINE CLINIC | Facility: CLINIC | Age: 58
End: 2018-04-23

## 2018-04-23 ENCOUNTER — APPOINTMENT (OUTPATIENT)
Dept: LAB | Age: 58
End: 2018-04-23
Attending: FAMILY MEDICINE
Payer: COMMERCIAL

## 2018-04-23 VITALS
BODY MASS INDEX: 39.7 KG/M2 | RESPIRATION RATE: 16 BRPM | HEART RATE: 68 BPM | DIASTOLIC BLOOD PRESSURE: 78 MMHG | HEIGHT: 64.5 IN | TEMPERATURE: 98 F | WEIGHT: 235.38 LBS | SYSTOLIC BLOOD PRESSURE: 126 MMHG

## 2018-04-23 DIAGNOSIS — G47.33 OBSTRUCTIVE SLEEP APNEA: ICD-10-CM

## 2018-04-23 DIAGNOSIS — I10 BENIGN ESSENTIAL HYPERTENSION: ICD-10-CM

## 2018-04-23 DIAGNOSIS — M25.551 PAIN OF RIGHT HIP JOINT: ICD-10-CM

## 2018-04-23 DIAGNOSIS — Z01.818 PREOP EXAMINATION: Primary | ICD-10-CM

## 2018-04-23 DIAGNOSIS — I49.3 PVC (PREMATURE VENTRICULAR CONTRACTION): ICD-10-CM

## 2018-04-23 PROCEDURE — 85025 COMPLETE CBC W/AUTO DIFF WBC: CPT | Performed by: FAMILY MEDICINE

## 2018-04-23 PROCEDURE — 36415 COLL VENOUS BLD VENIPUNCTURE: CPT | Performed by: FAMILY MEDICINE

## 2018-04-23 PROCEDURE — 99243 OFF/OP CNSLTJ NEW/EST LOW 30: CPT | Performed by: FAMILY MEDICINE

## 2018-04-23 PROCEDURE — 80053 COMPREHEN METABOLIC PANEL: CPT | Performed by: FAMILY MEDICINE

## 2018-04-23 PROCEDURE — 93000 ELECTROCARDIOGRAM COMPLETE: CPT | Performed by: FAMILY MEDICINE

## 2018-04-23 PROCEDURE — 85610 PROTHROMBIN TIME: CPT | Performed by: FAMILY MEDICINE

## 2018-04-23 PROCEDURE — 83735 ASSAY OF MAGNESIUM: CPT | Performed by: FAMILY MEDICINE

## 2018-04-23 RX ORDER — AMOXICILLIN 500 MG/1
4 CAPSULE ORAL AS NEEDED
Refills: 0 | COMMUNITY
Start: 2018-04-17 | End: 2020-10-28

## 2018-04-23 NOTE — PROGRESS NOTES
[facility] [place]  PROGRESS NOTE  Chief Complaint:   Patient presents with:  Pre-Op Exam      HPI:   This is a 62year old female coming in for evaluation.   This patient will be having a right total hip arthroplasty, redo, by Andrei Izaguirre MD, - 5.500 mIU/mL   -URINALYSIS WITH CULTURE REFLEX   Result Value Ref Range   Urine Color Yellow Yellow   Clarity Urine Clear Clear   Spec Gravity 1.019 1.001 - 1.030   Glucose Urine Negative Negative mg/dl   Bilirubin Urine Negative Negative   Ketones Urine 0.10 x10(3) uL   Immature Granulocyte Absolute 0.02 0.00 - 1.00 x10(3) uL   Neutrophil % 61.0 %   Lymphocyte % 29.8 %   Monocyte % 6.2 %   Eosinophil % 2.4 %   Basophil % 0.3 %   Immature Granulocyte % 0.3 %       Past Medical History:   Diagnosis Date   • cyanocobalamin 1000 MCG/ML Injection Solution Inject 1 mL (1,000 mcg total) into the muscle every 30 (thirty) days. Disp: 1 mL Rfl: 0   Multiple Vitamin (MULTI VITAMIN DAILY OR) Take 1 tablet by mouth daily.    Disp:  Rfl:    anastrozole 1 MG Oral Tab tab oriented, well developed, well nourished, no apparent distress.   HEENT:  Head:  Normocephalic, atraumatic Eyes: EOMI, PERRLA, no scleral icterus, conjunctivae clear bilaterally, no eye discharge Ears: External normal. Nose: patent, no nasal discharge Throa Vaccine(1) due on 09/01/2017  Annual Depression Screen due on 06/22/2018    Patient/Caregiver Education: Patient/Caregiver Education: There are no barriers to learning. Medical education done. Outcome: Patient verbalizes understanding.  Patient is notifie

## 2018-04-23 NOTE — PATIENT INSTRUCTIONS
Cardiology consult. If cardiology clearance is obtained then may have surgery. Stop aspirin 1 week prior to surgery.

## 2018-04-24 ENCOUNTER — TELEPHONE (OUTPATIENT)
Dept: FAMILY MEDICINE CLINIC | Facility: CLINIC | Age: 58
End: 2018-04-24

## 2018-04-24 NOTE — TELEPHONE ENCOUNTER
----- Message from Jennifer Valles MD sent at 4/24/2018  8:43 AM CDT -----  Preop labs are normal with exception of slightly elevated Alkaline Phosphatase which is not significant.   Please notify pt

## 2018-04-26 ENCOUNTER — TELEPHONE (OUTPATIENT)
Dept: FAMILY MEDICINE CLINIC | Facility: CLINIC | Age: 58
End: 2018-04-26

## 2018-05-07 NOTE — TELEPHONE ENCOUNTER
This patient was seen last month for preop for hip replacement. Her EKG showed PVCs. She was referred for cardiology consult. Her surgery is upcoming on May 15 but I have not heard from any cardiologist for preop clearance.   Please call patient and ask

## 2018-05-08 NOTE — TELEPHONE ENCOUNTER
Received fax from Dr. Dalia Webster for cardiology clearance for hip replacement surgery  All pre-op information and testing faxed to [facility]  Patient cleared for surgery  Patient notified    Patience Patrick, 05/08/18, 3:41 PM

## 2018-05-18 ENCOUNTER — TELEPHONE (OUTPATIENT)
Dept: FAMILY MEDICINE CLINIC | Facility: CLINIC | Age: 58
End: 2018-05-18

## 2018-05-18 NOTE — TELEPHONE ENCOUNTER
Josh calling from home health care  States patient is receiving home health following hip replacement  States patient is doing well  Patient taking Norco 5 mg for pain  States patient's pain level is tolerable  States patient will be starting PT next week

## 2018-06-04 RX ORDER — EZETIMIBE 10 MG/1
TABLET ORAL
Qty: 30 TABLET | Refills: 1 | Status: SHIPPED | OUTPATIENT
Start: 2018-06-04 | End: 2018-08-04

## 2018-06-04 NOTE — TELEPHONE ENCOUNTER
Future appt:    Last Appointment:  4/23/2018; Preop with Dr. Omar Chirinos, TOTAL (mg/dL)   Date Value   08/16/2017 212   ----------  Cholesterol, Total (mg/dL)   Date Value   12/16/2017 212 (H)   ----------  HDL Cholesterol (mg/dL)   Date Value

## 2018-08-06 RX ORDER — EZETIMIBE 10 MG/1
TABLET ORAL
Qty: 30 TABLET | Refills: 0 | Status: SHIPPED | OUTPATIENT
Start: 2018-08-06 | End: 2018-09-02

## 2018-08-06 NOTE — TELEPHONE ENCOUNTER
Future appt:    Last Appointment:  4/23/18 with Dr. Russ Schmidt for pre-op physical  11/28/17 with Dr. Kyung Fermin for physical    CHOLESTEROL, TOTAL (mg/dL)   Date Value   08/16/2017 212   ----------  Cholesterol, Total (mg/dL)   Date Value   12/16/2017 212 (H)   -

## 2018-08-24 LAB
A/G RATIO: 1.8
ALBUMIN: 4.6 G/DL
ALKALINE PHOSPHATASE: 150
ALT (SGPT): 35 IU/L
AST (SGOT): 39 IU/L
BILIRUBIN, DIRECT: 0.11 MG/DL
BILIRUBIN, TOTAL: 0.3 MG/DL
BUN/CREATININE RATIO: 19
BUN: 14
CALCIUM: 9.5
CHLORIDE: 104
CHOL/HDL RATIO: 3.9
CREATININE: 0.72 MG/DL
GGT: 29 IU/L
GLOBULIN, TOTAL: 2.5 G/DL
GLUCOSE BLOOD: 91
HDL CHOLESTEROL: 57 MG/DL
HEMATOCRIT: 41.1 %
HEMOGLOBIN A1C: 5.7
HEMOGLOBIN: 13.7 G/DL (ref 12–15)
IRON, SERUM: 65 ΜG/DL
LDH: 210 IU/L
LDL CHOLESTEROL CALC: 125 MG/DL
MCH: 28.4 PG
MCHC: 33.3 G/DL
MCV: 85 FL
PHOSPHORUS: 3.3
PLATELETS: 137 X10E3/UL
POTASSIUM: 4.4
RBC: 4.82 /HPF
RDW: 14.9 %
SODIUM: 143
TOTAL CHOLESTEROL: 225 MG/DL (ref ?–200)
TOTAL PROTEIN: 7.1
TRIGLYCERIDES: 213 MG/DL
TSH: 1.54 UIU/ML
URIC ACID: 7.1
VLDL CHOLESTEROL CAL: 43 MG/DL

## 2018-08-24 NOTE — PROGRESS NOTES
Outside labs into flowsheet  Per Dr. Vance April patient is due for appointment  Patient notified via [id number] [facility], 08/24/18, 8:25 AM

## 2018-09-04 RX ORDER — EZETIMIBE 10 MG/1
TABLET ORAL
Qty: 30 TABLET | Refills: 0 | Status: SHIPPED | OUTPATIENT
Start: 2018-09-04 | End: 2018-10-03

## 2018-09-04 RX ORDER — METOPROLOL SUCCINATE 25 MG/1
TABLET, EXTENDED RELEASE ORAL
Qty: 30 TABLET | Refills: 0 | Status: SHIPPED | OUTPATIENT
Start: 2018-09-04 | End: 2018-10-03

## 2018-09-04 NOTE — TELEPHONE ENCOUNTER
Future appt:    Last Appointment: 4/23/18 with Dr. Nallely Moura for pre-op exam for hip surgery  11/28/17 with Dr. Mony Segovia for Annual Physical    CHOLESTEROL, TOTAL (mg/dL)   Date Value   08/16/2017 212   ----------  TOTAL CHOLESTEROL (mg/dL)   Date Value   08/15

## 2018-10-03 NOTE — TELEPHONE ENCOUNTER
Future appt:     Your appointments     Date & Time Appointment Department [facility])    Dec 21, 2018  3:00 PM CST Physical - Established Patient with La Pérez MD [address] [place] [address]

## 2018-10-04 RX ORDER — METOPROLOL SUCCINATE 25 MG/1
TABLET, EXTENDED RELEASE ORAL
Qty: 30 TABLET | Refills: 2 | Status: SHIPPED | OUTPATIENT
Start: 2018-10-04 | End: 2018-12-21

## 2018-10-04 RX ORDER — EZETIMIBE 10 MG/1
TABLET ORAL
Qty: 30 TABLET | Refills: 2 | Status: SHIPPED | OUTPATIENT
Start: 2018-10-04 | End: 2018-12-21

## 2018-12-21 ENCOUNTER — OFFICE VISIT (OUTPATIENT)
Dept: FAMILY MEDICINE CLINIC | Facility: CLINIC | Age: 58
End: 2018-12-21
Payer: COMMERCIAL

## 2018-12-21 VITALS
WEIGHT: 236 LBS | HEART RATE: 74 BPM | BODY MASS INDEX: 39.8 KG/M2 | DIASTOLIC BLOOD PRESSURE: 70 MMHG | HEIGHT: 64.75 IN | SYSTOLIC BLOOD PRESSURE: 118 MMHG | TEMPERATURE: 98 F | RESPIRATION RATE: 16 BRPM

## 2018-12-21 DIAGNOSIS — M62.89 PELVIC FLOOR DYSFUNCTION IN FEMALE: ICD-10-CM

## 2018-12-21 DIAGNOSIS — Z00.00 ENCOUNTER FOR ROUTINE ADULT HEALTH EXAMINATION WITHOUT ABNORMAL FINDINGS: Primary | ICD-10-CM

## 2018-12-21 DIAGNOSIS — G47.33 OBSTRUCTIVE SLEEP APNEA: ICD-10-CM

## 2018-12-21 DIAGNOSIS — I10 BENIGN ESSENTIAL HYPERTENSION: ICD-10-CM

## 2018-12-21 DIAGNOSIS — E78.00 PURE HYPERCHOLESTEROLEMIA: ICD-10-CM

## 2018-12-21 PROCEDURE — 99396 PREV VISIT EST AGE 40-64: CPT | Performed by: FAMILY MEDICINE

## 2018-12-21 RX ORDER — EZETIMIBE 10 MG/1
TABLET ORAL
Qty: 90 TABLET | Refills: 1 | Status: SHIPPED | OUTPATIENT
Start: 2018-12-21 | End: 2019-04-24

## 2018-12-21 RX ORDER — METOPROLOL SUCCINATE 25 MG/1
25 TABLET, EXTENDED RELEASE ORAL
Qty: 90 TABLET | Refills: 1 | Status: ON HOLD | OUTPATIENT
Start: 2018-12-21 | End: 2019-05-10

## 2018-12-21 NOTE — PROGRESS NOTES
[facility] [place]  PROGRESS NOTE        HPI:   This is a 62year old female coming in for Patient presents with:  Physical    HPI She is not having any concerns.  Here for her routine physical..     Had her hip re-replaced in may, which is doing Laterality Date   •      • COLONOSCOPY     • HIP REPLACEMENT SURGERY     • MASTECTOMY RIGHT Right    • OTHER SURGICAL HISTORY Left 2017    Hammer Toe fixed    • REDUCTION OF LARGE BREAST Left    • TONSILLECTOMY       Social History:  Social Histor Rfl: 5   Multiple Vitamins-Minerals (ICAPS LUTEIN & ZEAXANTHIN OR) Take 1 capsule by mouth every other day. Disp:  Rfl:    Boswellia Cinthia (BOSWELLIA OR) Take by mouth. Disp:  Rfl:    Probiotic Product [facility]) Oral Cap Take 1 capsule by mouth daily. Allergic/Immunologic: Negative. Neurological: Negative. Hematological: Negative. Psychiatric/Behavioral: Negative. All other systems reviewed and are negative.       EXAM:   /70 (BP Location: Left arm, Patient Position: Sitting, Cuff Siz Future    Benign essential hypertension    Obstructive sleep apnea    Pelvic floor dysfunction in female  -     PHYSICAL THERAPY EXTERNAL    Other orders  -     Metoprolol Succinate ER 25 MG Oral Tablet 24 Hr;  Take 1 tablet (25 mg total) by mouth once radha

## 2018-12-29 ENCOUNTER — LABORATORY ENCOUNTER (OUTPATIENT)
Dept: LAB | Age: 58
End: 2018-12-29
Attending: FAMILY MEDICINE
Payer: COMMERCIAL

## 2018-12-29 DIAGNOSIS — E78.00 PURE HYPERCHOLESTEROLEMIA: ICD-10-CM

## 2018-12-29 PROCEDURE — 82550 ASSAY OF CK (CPK): CPT | Performed by: FAMILY MEDICINE

## 2018-12-29 PROCEDURE — 80053 COMPREHEN METABOLIC PANEL: CPT | Performed by: FAMILY MEDICINE

## 2018-12-29 PROCEDURE — 80061 LIPID PANEL: CPT | Performed by: FAMILY MEDICINE

## 2018-12-29 PROCEDURE — 85025 COMPLETE CBC W/AUTO DIFF WBC: CPT | Performed by: FAMILY MEDICINE

## 2018-12-29 PROCEDURE — 36415 COLL VENOUS BLD VENIPUNCTURE: CPT | Performed by: FAMILY MEDICINE

## 2018-12-31 ENCOUNTER — TELEPHONE (OUTPATIENT)
Dept: FAMILY MEDICINE CLINIC | Facility: CLINIC | Age: 58
End: 2018-12-31

## 2018-12-31 DIAGNOSIS — E78.00 PURE HYPERCHOLESTEROLEMIA: Primary | ICD-10-CM

## 2018-12-31 NOTE — TELEPHONE ENCOUNTER
----- Message from Denice Gillespie MD sent at 12/31/2018 10:06 AM CST -----  Cholesterol is too high. zeita is not enough. We need to discuss restarting a statin.

## 2019-01-02 RX ORDER — METOPROLOL SUCCINATE 25 MG/1
TABLET, EXTENDED RELEASE ORAL
Qty: 30 TABLET | Refills: 0 | OUTPATIENT
Start: 2019-01-02

## 2019-01-02 RX ORDER — EZETIMIBE 10 MG/1
TABLET ORAL
Qty: 30 TABLET | Refills: 0 | OUTPATIENT
Start: 2019-01-02

## 2019-01-08 NOTE — TELEPHONE ENCOUNTER
Patient notified of results and recommendations and expressed understanding.   Patient states she is open to starting a new medication if Dr. Bo Watson has recommendations  States she will come in if Dr. Bo Watson wants her to but it is hard to get appt time with her

## 2019-01-10 NOTE — TELEPHONE ENCOUNTER
Lets trial starting low dose crestor 5 mg daily. And follow up 373 and cmp in 6 weeks,   Risk of myalgia, liver dysfunction is outweighed by cardiac benefits.

## 2019-01-11 RX ORDER — ROSUVASTATIN CALCIUM 5 MG/1
5 TABLET, COATED ORAL NIGHTLY
Qty: 30 TABLET | Refills: 1 | Status: SHIPPED | OUTPATIENT
Start: 2019-01-11 | End: 2019-03-09

## 2019-01-11 NOTE — TELEPHONE ENCOUNTER
Patient notified of results and recommendations and expressed understanding.   Consulted with Ada as Dr. Rei Medrano out of the office  Per Ada Cavanaugh and remain on the Zetia  Patient advised of this  Script to [employer] per patient request  Salvador gardner

## 2019-01-16 ENCOUNTER — TELEPHONE (OUTPATIENT)
Dept: FAMILY MEDICINE CLINIC | Facility: CLINIC | Age: 59
End: 2019-01-16

## 2019-02-23 ENCOUNTER — APPOINTMENT (OUTPATIENT)
Dept: LAB | Age: 59
End: 2019-02-23
Attending: FAMILY MEDICINE
Payer: COMMERCIAL

## 2019-02-23 DIAGNOSIS — E78.00 PURE HYPERCHOLESTEROLEMIA: ICD-10-CM

## 2019-02-23 LAB
ALBUMIN SERPL-MCNC: 4.2 G/DL (ref 3.4–5)
ALBUMIN/GLOB SERPL: 1.2 {RATIO} (ref 1–2)
ALP LIVER SERPL-CCNC: 117 U/L (ref 46–118)
ALT SERPL-CCNC: 36 U/L (ref 13–56)
ANION GAP SERPL CALC-SCNC: 7 MMOL/L (ref 0–18)
AST SERPL-CCNC: 32 U/L (ref 15–37)
BILIRUB SERPL-MCNC: 0.6 MG/DL (ref 0.1–2)
BUN BLD-MCNC: 16 MG/DL (ref 7–18)
BUN/CREAT SERPL: 19.8 (ref 10–20)
CALCIUM BLD-MCNC: 9.4 MG/DL (ref 8.5–10.1)
CHLORIDE SERPL-SCNC: 109 MMOL/L (ref 98–107)
CHOLEST SMN-MCNC: 162 MG/DL (ref ?–200)
CK SERPL-CCNC: 160 U/L (ref 26–192)
CO2 SERPL-SCNC: 27 MMOL/L (ref 21–32)
CREAT BLD-MCNC: 0.81 MG/DL (ref 0.55–1.02)
GLOBULIN PLAS-MCNC: 3.4 G/DL (ref 2.8–4.4)
GLUCOSE BLD-MCNC: 89 MG/DL (ref 70–99)
HDLC SERPL-MCNC: 57 MG/DL (ref 40–59)
LDLC SERPL CALC-MCNC: 79 MG/DL (ref ?–100)
M PROTEIN MFR SERPL ELPH: 7.6 G/DL (ref 6.4–8.2)
NONHDLC SERPL-MCNC: 105 MG/DL (ref ?–130)
OSMOLALITY SERPL CALC.SUM OF ELEC: 297 MOSM/KG (ref 275–295)
POTASSIUM SERPL-SCNC: 4.2 MMOL/L (ref 3.5–5.1)
SODIUM SERPL-SCNC: 143 MMOL/L (ref 136–145)
TRIGL SERPL-MCNC: 132 MG/DL (ref 30–149)
VLDLC SERPL CALC-MCNC: 26 MG/DL (ref 0–30)

## 2019-02-23 PROCEDURE — 36415 COLL VENOUS BLD VENIPUNCTURE: CPT | Performed by: FAMILY MEDICINE

## 2019-02-23 PROCEDURE — 80053 COMPREHEN METABOLIC PANEL: CPT | Performed by: FAMILY MEDICINE

## 2019-02-23 PROCEDURE — 80061 LIPID PANEL: CPT | Performed by: FAMILY MEDICINE

## 2019-02-23 PROCEDURE — 82550 ASSAY OF CK (CPK): CPT | Performed by: FAMILY MEDICINE

## 2019-03-11 RX ORDER — ROSUVASTATIN CALCIUM 5 MG/1
TABLET, COATED ORAL
Qty: 30 TABLET | Refills: 0 | Status: SHIPPED | OUTPATIENT
Start: 2019-03-11 | End: 2019-04-11

## 2019-03-11 NOTE — TELEPHONE ENCOUNTER
Future appt:    Last Appointment:  12/21/2018; No f/u recommended    Cholesterol, Total (mg/dL)   Date Value   02/23/2019 162   08/16/2017 212     Total Cholesterol (mg/dL)   Date Value   08/15/2018 225 (H)     HDL Cholesterol (mg/dL)   Date Value   02/23/

## 2019-04-12 NOTE — TELEPHONE ENCOUNTER
Please advise refill of Rosuvastatin 5mg.   Last Rx: 3/11/19    Future appt:    Last Appointment:  12/21/2018 for physical no f/u noted      Cholesterol, Total (mg/dL)   Date Value   02/23/2019 162   08/16/2017 212     Total Cholesterol (mg/dL)   Date Value

## 2019-04-15 RX ORDER — ROSUVASTATIN CALCIUM 5 MG/1
TABLET, COATED ORAL
Qty: 30 TABLET | Refills: 0 | Status: SHIPPED | OUTPATIENT
Start: 2019-04-15 | End: 2019-05-21

## 2019-04-22 ENCOUNTER — TELEPHONE (OUTPATIENT)
Dept: FAMILY MEDICINE CLINIC | Facility: CLINIC | Age: 59
End: 2019-04-22

## 2019-04-22 NOTE — TELEPHONE ENCOUNTER
Noted.  Future Appointments   Date Time Provider Katie Poe   4/23/2019  2:00 PM Norbert Francois MD [facility] [place] [facility]

## 2019-04-22 NOTE — TELEPHONE ENCOUNTER
Patient states she had a R hip re-replacement on May 15th 2018. States in March of 2019, she was having trouble walking so she saw the Doctor who did the surgery at [facility]. States xrays were taken and she was informed that a plate had shifted posteriorly.

## 2019-04-24 ENCOUNTER — OFFICE VISIT (OUTPATIENT)
Dept: FAMILY MEDICINE CLINIC | Facility: CLINIC | Age: 59
End: 2019-04-24
Payer: COMMERCIAL

## 2019-04-24 ENCOUNTER — TELEPHONE (OUTPATIENT)
Dept: FAMILY MEDICINE CLINIC | Facility: CLINIC | Age: 59
End: 2019-04-24

## 2019-04-24 ENCOUNTER — HOSPITAL ENCOUNTER (OUTPATIENT)
Dept: GENERAL RADIOLOGY | Age: 59
Discharge: HOME | End: 2019-04-24
Attending: FAMILY MEDICINE
Payer: COMMERCIAL

## 2019-04-24 ENCOUNTER — LAB ENCOUNTER (OUTPATIENT)
Dept: LAB | Age: 59
End: 2019-04-24
Attending: FAMILY MEDICINE
Payer: COMMERCIAL

## 2019-04-24 VITALS
SYSTOLIC BLOOD PRESSURE: 122 MMHG | HEIGHT: 64.75 IN | RESPIRATION RATE: 18 BRPM | WEIGHT: 235 LBS | DIASTOLIC BLOOD PRESSURE: 78 MMHG | HEART RATE: 67 BPM | TEMPERATURE: 97 F | BODY MASS INDEX: 39.63 KG/M2

## 2019-04-24 DIAGNOSIS — Z01.818 PREOPERATIVE EXAMINATION: ICD-10-CM

## 2019-04-24 DIAGNOSIS — Z01.818 PREOPERATIVE EXAMINATION: Primary | ICD-10-CM

## 2019-04-24 DIAGNOSIS — I10 BENIGN ESSENTIAL HYPERTENSION: ICD-10-CM

## 2019-04-24 DIAGNOSIS — M25.551 RIGHT HIP PAIN: ICD-10-CM

## 2019-04-24 DIAGNOSIS — G47.33 OBSTRUCTIVE SLEEP APNEA: ICD-10-CM

## 2019-04-24 PROCEDURE — 36415 COLL VENOUS BLD VENIPUNCTURE: CPT | Performed by: FAMILY MEDICINE

## 2019-04-24 PROCEDURE — 81001 URINALYSIS AUTO W/SCOPE: CPT | Performed by: FAMILY MEDICINE

## 2019-04-24 PROCEDURE — 85025 COMPLETE CBC W/AUTO DIFF WBC: CPT | Performed by: FAMILY MEDICINE

## 2019-04-24 PROCEDURE — 87086 URINE CULTURE/COLONY COUNT: CPT | Performed by: FAMILY MEDICINE

## 2019-04-24 PROCEDURE — 87081 CULTURE SCREEN ONLY: CPT | Performed by: FAMILY MEDICINE

## 2019-04-24 PROCEDURE — 71046 X-RAY EXAM CHEST 2 VIEWS: CPT | Performed by: FAMILY MEDICINE

## 2019-04-24 PROCEDURE — 99244 OFF/OP CNSLTJ NEW/EST MOD 40: CPT | Performed by: FAMILY MEDICINE

## 2019-04-24 PROCEDURE — 93000 ELECTROCARDIOGRAM COMPLETE: CPT | Performed by: FAMILY MEDICINE

## 2019-04-24 PROCEDURE — 80053 COMPREHEN METABOLIC PANEL: CPT | Performed by: FAMILY MEDICINE

## 2019-04-24 RX ORDER — EZETIMIBE 10 MG/1
TABLET ORAL
COMMUNITY
Start: 2019-04-24 | End: 2019-06-25

## 2019-04-24 NOTE — TELEPHONE ENCOUNTER
[facility] questions if AntiBody Titer was to actually have been Type/Screen? Please call Thursday with clarification to order.   Tonny Saleem, 04/24/19, 6:14 PM

## 2019-04-24 NOTE — PATIENT INSTRUCTIONS
EKG shows normal sinus rhythm. Check labs and xray today. After today's assessment  patient is at optimum health for surgery and relatively at low risk. There are no contraindication for procedure.    Avoid any aleve, aspirin or ibuprofen 1 week before

## 2019-04-24 NOTE — PROGRESS NOTES
[facility]  PRE-OP NOTE    Chief Complaint:   Patient presents with:  Pre-Op Exam      HPI:   Susie Pacheco is a 61year old female with a hx of R hip pain, R hip replacement, hypertension, who presents for a pre-operative physical exa pets that include 1 cat,  calves. no horses. Rick Curiel lives with her Son, . She lives at home in [place]. She is  with 2 children. children. none is none. Pooja's [employer] is [employer].      Family History:  Family History   Problem Relation Age of Onse lesion, or excessive skin dryness. CARDIOVASCULAR:  Denies chest pain, chest pressure, chest discomfort, palpitations, edema, dyspnea on exertion or at rest.  RESPIRATORY:  Denies shortness of breath, wheezing, cough or sputum.   GASTROINTESTINAL:  Denies bilterally, no rales/rhonchi/wheezing. CHEST: No tenderness. ABDOMEN:  Soft, nondistended, nontender, bowel sounds normal in all 4 quadrants, no masses, no hepatosplenomegaly.   MUSCULOSKELETAL: Patient is using crutches for support, slow gait, favoring r List:     H/O arthroscopic knee surgery     Malignant neoplasm of breast (female) ([facility] [address]     S/P bunionectomy     H/O:      Diverticulosis of colon     Elevation of level of transaminase and lactic acid dehydrogenase (LDH)     Encounter for general

## 2019-04-25 NOTE — TELEPHONE ENCOUNTER
Spoke with Storm Zaidi and they cant do a antibody titer because the antibody was negative and theres nothing to titer.  William Sampson exactly what it states on pre op form stating that do NOT order Type and Screen as it will NOT be accepted by [facility]

## 2019-04-25 NOTE — TELEPHONE ENCOUNTER
The order is supposed to be typed and screen but per preop orders it mentions that if lab is done at outside of [facility] then, they will not accept type and screen and they would like to have a antibody screen.

## 2019-04-26 ENCOUNTER — TELEPHONE (OUTPATIENT)
Dept: FAMILY MEDICINE CLINIC | Facility: CLINIC | Age: 59
End: 2019-04-26

## 2019-04-26 NOTE — TELEPHONE ENCOUNTER
Let pt know the following below. Pt verbalized her understanding and had no other questions at this time. Faxed to surgeon.

## 2019-04-26 NOTE — TELEPHONE ENCOUNTER
----- Message from Jamarcus Smith MD sent at 4/26/2019  7:48 AM CDT -----  Please inform patient that her CBC, CMP, chest x-ray, UA and urine culture is normal.  Her nasal swab is negative for MRSA but it is positive for growth of Staphylococcus aureus.   Aren Rosales

## 2019-04-29 PROBLEM — Z96.649 FAILED TOTAL HIP ARTHROPLASTY  (HCC): Status: ACTIVE | Noted: 2019-04-29

## 2019-04-29 PROBLEM — T84.018A FAILED TOTAL HIP ARTHROPLASTY: Status: ACTIVE | Noted: 2019-04-29

## 2019-04-29 PROBLEM — Z96.649 FAILED TOTAL HIP ARTHROPLASTY (HCC): Status: ACTIVE | Noted: 2019-04-29

## 2019-04-29 PROBLEM — T84.018A FAILED TOTAL HIP ARTHROPLASTY (HCC): Status: ACTIVE | Noted: 2019-04-29

## 2019-04-29 PROBLEM — Z96.649 FAILED TOTAL HIP ARTHROPLASTY: Status: ACTIVE | Noted: 2019-04-29

## 2019-04-29 PROBLEM — T84.018A FAILED TOTAL HIP ARTHROPLASTY  (HCC): Status: ACTIVE | Noted: 2019-04-29

## 2019-04-29 NOTE — DISCHARGE SUMMARY
Ortho Discharge Summary     Patient ID:  Marisel Oropeza  [id number]  31 year old  3/19/1960      Admit Date: 5/7/2019  1:46 PM    Discharge Date and Time: 5/10/2019  6:42 PM    Attending Physician: Gricelda Fisher MD     Reason for admission: right hip mec

## 2019-05-02 ENCOUNTER — TELEPHONE (OUTPATIENT)
Dept: FAMILY MEDICINE CLINIC | Facility: CLINIC | Age: 59
End: 2019-05-02

## 2019-05-02 NOTE — TELEPHONE ENCOUNTER
Patient has questions regarding a colonoscopy ordered by Dr Altagracia Gomez on 02/26/18 done at [facility].

## 2019-05-02 NOTE — TELEPHONE ENCOUNTER
Spoke with pt and she wanted to verify that she had a colonoscopy done last year. Report on chart states that it was done on 2/26/18. Pt notified and verbalized understanding.

## 2019-05-07 ENCOUNTER — ANESTHESIA (OUTPATIENT)
Dept: SURGERY | Facility: HOSPITAL | Age: 59
DRG: 467 | End: 2019-05-07
Payer: COMMERCIAL

## 2019-05-07 ENCOUNTER — HOSPITAL ENCOUNTER (INPATIENT)
Facility: HOSPITAL | Age: 59
LOS: 3 days | Discharge: HOME HEALTH | DRG: 467 | End: 2019-05-10
Attending: ORTHOPAEDIC SURGERY | Admitting: ORTHOPAEDIC SURGERY
Payer: COMMERCIAL

## 2019-05-07 ENCOUNTER — APPOINTMENT (OUTPATIENT)
Dept: GENERAL RADIOLOGY | Facility: HOSPITAL | Age: 59
DRG: 467 | End: 2019-05-07
Attending: PHYSICIAN ASSISTANT
Payer: COMMERCIAL

## 2019-05-07 ENCOUNTER — ANESTHESIA EVENT (OUTPATIENT)
Dept: SURGERY | Facility: HOSPITAL | Age: 59
DRG: 467 | End: 2019-05-07
Payer: COMMERCIAL

## 2019-05-07 PROBLEM — Z96.649 S/P TOTAL HIP ARTHROPLASTY: Status: ACTIVE | Noted: 2019-05-07

## 2019-05-07 PROCEDURE — 0SP90JZ REMOVAL OF SYNTHETIC SUBSTITUTE FROM RIGHT HIP JOINT, OPEN APPROACH: ICD-10-PCS | Performed by: ORTHOPAEDIC SURGERY

## 2019-05-07 PROCEDURE — 0SR90J9 REPLACEMENT OF RIGHT HIP JOINT WITH SYNTHETIC SUBSTITUTE, CEMENTED, OPEN APPROACH: ICD-10-PCS | Performed by: ORTHOPAEDIC SURGERY

## 2019-05-07 PROCEDURE — 99232 SBSQ HOSP IP/OBS MODERATE 35: CPT | Performed by: HOSPITALIST

## 2019-05-07 PROCEDURE — 73502 X-RAY EXAM HIP UNI 2-3 VIEWS: CPT | Performed by: PHYSICIAN ASSISTANT

## 2019-05-07 DEVICE — IMPLANTABLE DEVICE
Type: IMPLANTABLE DEVICE | Status: FUNCTIONAL
Brand: TRABECULAR METAL™

## 2019-05-07 DEVICE — CEM BN NLTX STRL REUSE MED VSC: Type: IMPLANTABLE DEVICE | Status: FUNCTIONAL

## 2019-05-07 DEVICE — BONE SCREW 6.5X30 SELF-TAP: Type: IMPLANTABLE DEVICE | Status: FUNCTIONAL

## 2019-05-07 DEVICE — BONE SCREW 6.5X15 SELF-TAP: Type: IMPLANTABLE DEVICE | Status: FUNCTIONAL

## 2019-05-07 DEVICE — BIOLOX DELTA TS CERAMIC FEMORAL HEAD 12/14 TAPER REVISION DIAMETER 40MM +12
Type: IMPLANTABLE DEVICE | Status: FUNCTIONAL
Brand: BIOLOX DELTA

## 2019-05-07 DEVICE — BONE SCREW 6.5X20 SELF-TAP: Type: IMPLANTABLE DEVICE | Status: FUNCTIONAL

## 2019-05-07 RX ORDER — CYCLOBENZAPRINE HCL 5 MG
5 TABLET ORAL EVERY 8 HOURS PRN
Status: DISCONTINUED | OUTPATIENT
Start: 2019-05-07 | End: 2019-05-10

## 2019-05-07 RX ORDER — FAMOTIDINE 20 MG/1
20 TABLET ORAL ONCE
Status: COMPLETED | OUTPATIENT
Start: 2019-05-07 | End: 2019-05-07

## 2019-05-07 RX ORDER — TRAMADOL HYDROCHLORIDE 50 MG/1
50 TABLET ORAL EVERY 6 HOURS PRN
Status: DISCONTINUED | OUTPATIENT
Start: 2019-05-07 | End: 2019-05-10

## 2019-05-07 RX ORDER — DIPHENHYDRAMINE HYDROCHLORIDE 50 MG/ML
12.5 INJECTION INTRAMUSCULAR; INTRAVENOUS EVERY 4 HOURS PRN
Status: DISCONTINUED | OUTPATIENT
Start: 2019-05-07 | End: 2019-05-10

## 2019-05-07 RX ORDER — GABAPENTIN 100 MG/1
100 CAPSULE ORAL 3 TIMES DAILY
Qty: 60 CAPSULE | Refills: 0 | Status: SHIPPED | OUTPATIENT
Start: 2019-05-07 | End: 2019-06-25

## 2019-05-07 RX ORDER — CELECOXIB 200 MG/1
200 CAPSULE ORAL ONCE
Status: COMPLETED | OUTPATIENT
Start: 2019-05-07 | End: 2019-05-07

## 2019-05-07 RX ORDER — HYDROMORPHONE HYDROCHLORIDE 1 MG/ML
0.4 INJECTION, SOLUTION INTRAMUSCULAR; INTRAVENOUS; SUBCUTANEOUS EVERY 2 HOUR PRN
Status: DISCONTINUED | OUTPATIENT
Start: 2019-05-07 | End: 2019-05-10

## 2019-05-07 RX ORDER — EPHEDRINE SULFATE 50 MG/ML
INJECTION, SOLUTION INTRAVENOUS AS NEEDED
Status: DISCONTINUED | OUTPATIENT
Start: 2019-05-07 | End: 2019-05-07 | Stop reason: SURG

## 2019-05-07 RX ORDER — DOCUSATE SODIUM 100 MG/1
100 CAPSULE, LIQUID FILLED ORAL 2 TIMES DAILY
Status: DISCONTINUED | OUTPATIENT
Start: 2019-05-07 | End: 2019-05-10

## 2019-05-07 RX ORDER — NEOSTIGMINE METHYLSULFATE 0.5 MG/ML
INJECTION INTRAVENOUS AS NEEDED
Status: DISCONTINUED | OUTPATIENT
Start: 2019-05-07 | End: 2019-05-07 | Stop reason: SURG

## 2019-05-07 RX ORDER — SODIUM CHLORIDE, SODIUM LACTATE, POTASSIUM CHLORIDE, CALCIUM CHLORIDE 600; 310; 30; 20 MG/100ML; MG/100ML; MG/100ML; MG/100ML
INJECTION, SOLUTION INTRAVENOUS CONTINUOUS
Status: DISCONTINUED | OUTPATIENT
Start: 2019-05-07 | End: 2019-05-07 | Stop reason: HOSPADM

## 2019-05-07 RX ORDER — HYDROMORPHONE HYDROCHLORIDE 1 MG/ML
0.2 INJECTION, SOLUTION INTRAMUSCULAR; INTRAVENOUS; SUBCUTANEOUS EVERY 2 HOUR PRN
Status: DISCONTINUED | OUTPATIENT
Start: 2019-05-07 | End: 2019-05-10

## 2019-05-07 RX ORDER — ONDANSETRON 4 MG/1
4 TABLET, FILM COATED ORAL EVERY 8 HOURS PRN
Qty: 30 TABLET | Refills: 0 | Status: SHIPPED | OUTPATIENT
Start: 2019-05-07 | End: 2019-06-25

## 2019-05-07 RX ORDER — LIDOCAINE HYDROCHLORIDE 10 MG/ML
INJECTION, SOLUTION EPIDURAL; INFILTRATION; INTRACAUDAL; PERINEURAL AS NEEDED
Status: DISCONTINUED | OUTPATIENT
Start: 2019-05-07 | End: 2019-05-07 | Stop reason: SURG

## 2019-05-07 RX ORDER — ASPIRIN 325 MG
325 TABLET, DELAYED RELEASE (ENTERIC COATED) ORAL 2 TIMES DAILY
Qty: 60 TABLET | Refills: 0 | Status: SHIPPED | OUTPATIENT
Start: 2019-05-07 | End: 2019-06-25

## 2019-05-07 RX ORDER — METOPROLOL TARTRATE 5 MG/5ML
2.5 INJECTION INTRAVENOUS ONCE
Status: DISCONTINUED | OUTPATIENT
Start: 2019-05-07 | End: 2019-05-07 | Stop reason: HOSPADM

## 2019-05-07 RX ORDER — HYDROMORPHONE HYDROCHLORIDE 1 MG/ML
0.2 INJECTION, SOLUTION INTRAMUSCULAR; INTRAVENOUS; SUBCUTANEOUS EVERY 5 MIN PRN
Status: DISCONTINUED | OUTPATIENT
Start: 2019-05-07 | End: 2019-05-07 | Stop reason: HOSPADM

## 2019-05-07 RX ORDER — GLYCOPYRROLATE 0.2 MG/ML
INJECTION INTRAMUSCULAR; INTRAVENOUS AS NEEDED
Status: DISCONTINUED | OUTPATIENT
Start: 2019-05-07 | End: 2019-05-07 | Stop reason: SURG

## 2019-05-07 RX ORDER — PHENYLEPHRINE HCL 10 MG/ML
VIAL (ML) INJECTION AS NEEDED
Status: DISCONTINUED | OUTPATIENT
Start: 2019-05-07 | End: 2019-05-07 | Stop reason: SURG

## 2019-05-07 RX ORDER — HYDROMORPHONE HYDROCHLORIDE 1 MG/ML
0.4 INJECTION, SOLUTION INTRAMUSCULAR; INTRAVENOUS; SUBCUTANEOUS EVERY 5 MIN PRN
Status: DISCONTINUED | OUTPATIENT
Start: 2019-05-07 | End: 2019-05-07 | Stop reason: HOSPADM

## 2019-05-07 RX ORDER — SODIUM CHLORIDE 0.9 % (FLUSH) 0.9 %
10 SYRINGE (ML) INJECTION AS NEEDED
Status: DISCONTINUED | OUTPATIENT
Start: 2019-05-07 | End: 2019-05-10

## 2019-05-07 RX ORDER — PANTOPRAZOLE SODIUM 40 MG/1
40 TABLET, DELAYED RELEASE ORAL
Qty: 30 TABLET | Refills: 0 | Status: SHIPPED | OUTPATIENT
Start: 2019-05-07 | End: 2019-06-25

## 2019-05-07 RX ORDER — ROSUVASTATIN CALCIUM 5 MG/1
5 TABLET, COATED ORAL NIGHTLY
Status: DISCONTINUED | OUTPATIENT
Start: 2019-05-08 | End: 2019-05-10

## 2019-05-07 RX ORDER — TRAMADOL HYDROCHLORIDE 50 MG/1
100 TABLET ORAL EVERY 6 HOURS PRN
Status: DISCONTINUED | OUTPATIENT
Start: 2019-05-07 | End: 2019-05-10

## 2019-05-07 RX ORDER — HYDROCODONE BITARTRATE AND ACETAMINOPHEN 5; 325 MG/1; MG/1
2 TABLET ORAL AS NEEDED
Status: DISCONTINUED | OUTPATIENT
Start: 2019-05-07 | End: 2019-05-07 | Stop reason: HOSPADM

## 2019-05-07 RX ORDER — GABAPENTIN 100 MG/1
100 CAPSULE ORAL 3 TIMES DAILY
Status: DISCONTINUED | OUTPATIENT
Start: 2019-05-07 | End: 2019-05-10

## 2019-05-07 RX ORDER — ONDANSETRON 2 MG/ML
4 INJECTION INTRAMUSCULAR; INTRAVENOUS EVERY 4 HOURS PRN
Status: DISPENSED | OUTPATIENT
Start: 2019-05-07 | End: 2019-05-09

## 2019-05-07 RX ORDER — SODIUM CHLORIDE, SODIUM LACTATE, POTASSIUM CHLORIDE, CALCIUM CHLORIDE 600; 310; 30; 20 MG/100ML; MG/100ML; MG/100ML; MG/100ML
INJECTION, SOLUTION INTRAVENOUS CONTINUOUS PRN
Status: DISCONTINUED | OUTPATIENT
Start: 2019-05-07 | End: 2019-05-07 | Stop reason: SURG

## 2019-05-07 RX ORDER — CEFAZOLIN SODIUM/WATER 2 G/20 ML
2 SYRINGE (ML) INTRAVENOUS EVERY 8 HOURS
Status: COMPLETED | OUTPATIENT
Start: 2019-05-08 | End: 2019-05-08

## 2019-05-07 RX ORDER — ROCURONIUM BROMIDE 10 MG/ML
INJECTION, SOLUTION INTRAVENOUS AS NEEDED
Status: DISCONTINUED | OUTPATIENT
Start: 2019-05-07 | End: 2019-05-07 | Stop reason: SURG

## 2019-05-07 RX ORDER — SODIUM CHLORIDE, SODIUM LACTATE, POTASSIUM CHLORIDE, CALCIUM CHLORIDE 600; 310; 30; 20 MG/100ML; MG/100ML; MG/100ML; MG/100ML
INJECTION, SOLUTION INTRAVENOUS CONTINUOUS
Status: DISCONTINUED | OUTPATIENT
Start: 2019-05-07 | End: 2019-05-10

## 2019-05-07 RX ORDER — SCOLOPAMINE TRANSDERMAL SYSTEM 1 MG/1
1 PATCH, EXTENDED RELEASE TRANSDERMAL ONCE
Status: COMPLETED | OUTPATIENT
Start: 2019-05-07 | End: 2019-05-10

## 2019-05-07 RX ORDER — ASPIRIN 325 MG
325 TABLET ORAL 2 TIMES DAILY
Status: DISCONTINUED | OUTPATIENT
Start: 2019-05-07 | End: 2019-05-10

## 2019-05-07 RX ORDER — MORPHINE SULFATE 4 MG/ML
2 INJECTION, SOLUTION INTRAMUSCULAR; INTRAVENOUS EVERY 10 MIN PRN
Status: DISCONTINUED | OUTPATIENT
Start: 2019-05-07 | End: 2019-05-07 | Stop reason: HOSPADM

## 2019-05-07 RX ORDER — ACETAMINOPHEN 500 MG
1000 TABLET ORAL EVERY 6 HOURS PRN
Qty: 60 TABLET | Refills: 2 | Status: SHIPPED | OUTPATIENT
Start: 2019-05-07 | End: 2019-06-25

## 2019-05-07 RX ORDER — IBUPROFEN 600 MG/1
600 TABLET ORAL EVERY 8 HOURS SCHEDULED
Status: DISCONTINUED | OUTPATIENT
Start: 2019-05-08 | End: 2019-05-10

## 2019-05-07 RX ORDER — SODIUM PHOSPHATE, DIBASIC AND SODIUM PHOSPHATE, MONOBASIC 7; 19 G/133ML; G/133ML
1 ENEMA RECTAL ONCE AS NEEDED
Status: DISCONTINUED | OUTPATIENT
Start: 2019-05-07 | End: 2019-05-10

## 2019-05-07 RX ORDER — DOCUSATE SODIUM 100 MG/1
100 CAPSULE, LIQUID FILLED ORAL 2 TIMES DAILY
Qty: 60 CAPSULE | Refills: 2 | Status: SHIPPED | OUTPATIENT
Start: 2019-05-07 | End: 2019-12-05

## 2019-05-07 RX ORDER — FAMOTIDINE 10 MG/ML
20 INJECTION, SOLUTION INTRAVENOUS 2 TIMES DAILY
Status: DISCONTINUED | OUTPATIENT
Start: 2019-05-07 | End: 2019-05-10

## 2019-05-07 RX ORDER — HYDROMORPHONE HYDROCHLORIDE 1 MG/ML
0.8 INJECTION, SOLUTION INTRAMUSCULAR; INTRAVENOUS; SUBCUTANEOUS EVERY 2 HOUR PRN
Status: DISCONTINUED | OUTPATIENT
Start: 2019-05-07 | End: 2019-05-10

## 2019-05-07 RX ORDER — HYDROMORPHONE HYDROCHLORIDE 1 MG/ML
0.6 INJECTION, SOLUTION INTRAMUSCULAR; INTRAVENOUS; SUBCUTANEOUS EVERY 5 MIN PRN
Status: DISCONTINUED | OUTPATIENT
Start: 2019-05-07 | End: 2019-05-07 | Stop reason: HOSPADM

## 2019-05-07 RX ORDER — DEXAMETHASONE SODIUM PHOSPHATE 4 MG/ML
VIAL (ML) INJECTION AS NEEDED
Status: DISCONTINUED | OUTPATIENT
Start: 2019-05-07 | End: 2019-05-07 | Stop reason: SURG

## 2019-05-07 RX ORDER — POLYETHYLENE GLYCOL 3350 17 G/17G
17 POWDER, FOR SOLUTION ORAL DAILY PRN
Status: DISCONTINUED | OUTPATIENT
Start: 2019-05-07 | End: 2019-05-10

## 2019-05-07 RX ORDER — ONDANSETRON 2 MG/ML
4 INJECTION INTRAMUSCULAR; INTRAVENOUS ONCE AS NEEDED
Status: DISCONTINUED | OUTPATIENT
Start: 2019-05-07 | End: 2019-05-07 | Stop reason: HOSPADM

## 2019-05-07 RX ORDER — MIDAZOLAM HYDROCHLORIDE 1 MG/ML
INJECTION INTRAMUSCULAR; INTRAVENOUS AS NEEDED
Status: DISCONTINUED | OUTPATIENT
Start: 2019-05-07 | End: 2019-05-07 | Stop reason: SURG

## 2019-05-07 RX ORDER — TRAMADOL HYDROCHLORIDE 50 MG/1
TABLET ORAL EVERY 4 HOURS PRN
Qty: 60 TABLET | Refills: 0 | Status: SHIPPED | OUTPATIENT
Start: 2019-05-07 | End: 2019-06-25

## 2019-05-07 RX ORDER — ACETAMINOPHEN 500 MG
1000 TABLET ORAL ONCE
Status: COMPLETED | OUTPATIENT
Start: 2019-05-07 | End: 2019-05-07

## 2019-05-07 RX ORDER — IBUPROFEN 600 MG/1
600 TABLET ORAL EVERY 8 HOURS PRN
Qty: 90 TABLET | Refills: 2 | Status: SHIPPED | OUTPATIENT
Start: 2019-05-07 | End: 2020-10-15

## 2019-05-07 RX ORDER — DIPHENHYDRAMINE HCL 25 MG
25 CAPSULE ORAL EVERY 4 HOURS PRN
Status: DISCONTINUED | OUTPATIENT
Start: 2019-05-07 | End: 2019-05-10

## 2019-05-07 RX ORDER — BISACODYL 10 MG
10 SUPPOSITORY, RECTAL RECTAL
Status: DISCONTINUED | OUTPATIENT
Start: 2019-05-07 | End: 2019-05-10

## 2019-05-07 RX ORDER — ACETAMINOPHEN 325 MG/1
650 TABLET ORAL EVERY 6 HOURS SCHEDULED
Status: DISCONTINUED | OUTPATIENT
Start: 2019-05-08 | End: 2019-05-10

## 2019-05-07 RX ORDER — KETOROLAC TROMETHAMINE 30 MG/ML
30 INJECTION, SOLUTION INTRAMUSCULAR; INTRAVENOUS EVERY 8 HOURS SCHEDULED
Status: COMPLETED | OUTPATIENT
Start: 2019-05-07 | End: 2019-05-08

## 2019-05-07 RX ORDER — OXYCODONE HYDROCHLORIDE 5 MG/1
5 TABLET ORAL EVERY 4 HOURS PRN
Qty: 60 TABLET | Refills: 0 | Status: SHIPPED | OUTPATIENT
Start: 2019-05-07 | End: 2019-06-25

## 2019-05-07 RX ORDER — EZETIMIBE 10 MG/1
10 TABLET ORAL DAILY
Status: DISCONTINUED | OUTPATIENT
Start: 2019-05-08 | End: 2019-05-10

## 2019-05-07 RX ORDER — METOCLOPRAMIDE 10 MG/1
10 TABLET ORAL ONCE
Status: COMPLETED | OUTPATIENT
Start: 2019-05-07 | End: 2019-05-07

## 2019-05-07 RX ORDER — OXYCODONE HYDROCHLORIDE 5 MG/1
5 TABLET ORAL EVERY 4 HOURS PRN
Status: DISCONTINUED | OUTPATIENT
Start: 2019-05-07 | End: 2019-05-10

## 2019-05-07 RX ORDER — DIPHENHYDRAMINE HYDROCHLORIDE 50 MG/ML
25 INJECTION INTRAMUSCULAR; INTRAVENOUS ONCE AS NEEDED
Status: ACTIVE | OUTPATIENT
Start: 2019-05-07 | End: 2019-05-07

## 2019-05-07 RX ORDER — MORPHINE SULFATE 10 MG/ML
6 INJECTION, SOLUTION INTRAMUSCULAR; INTRAVENOUS EVERY 10 MIN PRN
Status: DISCONTINUED | OUTPATIENT
Start: 2019-05-07 | End: 2019-05-07 | Stop reason: HOSPADM

## 2019-05-07 RX ORDER — HYDROCODONE BITARTRATE AND ACETAMINOPHEN 5; 325 MG/1; MG/1
1 TABLET ORAL AS NEEDED
Status: DISCONTINUED | OUTPATIENT
Start: 2019-05-07 | End: 2019-05-07 | Stop reason: HOSPADM

## 2019-05-07 RX ORDER — METOCLOPRAMIDE HYDROCHLORIDE 5 MG/ML
10 INJECTION INTRAMUSCULAR; INTRAVENOUS EVERY 6 HOURS PRN
Status: ACTIVE | OUTPATIENT
Start: 2019-05-07 | End: 2019-05-09

## 2019-05-07 RX ORDER — CEFAZOLIN SODIUM/WATER 2 G/20 ML
2 SYRINGE (ML) INTRAVENOUS ONCE
Status: COMPLETED | OUTPATIENT
Start: 2019-05-07 | End: 2019-05-07

## 2019-05-07 RX ORDER — MORPHINE SULFATE 4 MG/ML
4 INJECTION, SOLUTION INTRAMUSCULAR; INTRAVENOUS EVERY 10 MIN PRN
Status: DISCONTINUED | OUTPATIENT
Start: 2019-05-07 | End: 2019-05-07 | Stop reason: HOSPADM

## 2019-05-07 RX ORDER — METOPROLOL SUCCINATE 25 MG/1
25 TABLET, EXTENDED RELEASE ORAL
Status: DISCONTINUED | OUTPATIENT
Start: 2019-05-08 | End: 2019-05-10

## 2019-05-07 RX ORDER — NALOXONE HYDROCHLORIDE 0.4 MG/ML
80 INJECTION, SOLUTION INTRAMUSCULAR; INTRAVENOUS; SUBCUTANEOUS AS NEEDED
Status: DISCONTINUED | OUTPATIENT
Start: 2019-05-07 | End: 2019-05-07 | Stop reason: HOSPADM

## 2019-05-07 RX ORDER — OXYCODONE HYDROCHLORIDE 5 MG/1
10 TABLET ORAL EVERY 4 HOURS PRN
Status: DISCONTINUED | OUTPATIENT
Start: 2019-05-07 | End: 2019-05-10

## 2019-05-07 RX ORDER — FAMOTIDINE 20 MG/1
20 TABLET ORAL 2 TIMES DAILY
Status: DISCONTINUED | OUTPATIENT
Start: 2019-05-07 | End: 2019-05-10

## 2019-05-07 RX ORDER — OXYCODONE HCL 10 MG/1
10 TABLET, FILM COATED, EXTENDED RELEASE ORAL ONCE
Status: COMPLETED | OUTPATIENT
Start: 2019-05-07 | End: 2019-05-07

## 2019-05-07 RX ADMIN — SODIUM CHLORIDE, SODIUM LACTATE, POTASSIUM CHLORIDE, CALCIUM CHLORIDE: 600; 310; 30; 20 INJECTION, SOLUTION INTRAVENOUS at 16:36:00

## 2019-05-07 RX ADMIN — DEXAMETHASONE SODIUM PHOSPHATE 4 MG: 4 MG/ML VIAL (ML) INJECTION at 16:41:00

## 2019-05-07 RX ADMIN — GLYCOPYRROLATE 0.6 MG: 0.2 INJECTION INTRAMUSCULAR; INTRAVENOUS at 19:18:00

## 2019-05-07 RX ADMIN — NEOSTIGMINE METHYLSULFATE 4 MG: 0.5 INJECTION INTRAVENOUS at 19:18:00

## 2019-05-07 RX ADMIN — CEFAZOLIN SODIUM/WATER 2 G: 2 G/20 ML SYRINGE (ML) INTRAVENOUS at 16:42:00

## 2019-05-07 RX ADMIN — PHENYLEPHRINE HCL 100 MCG: 10 MG/ML VIAL (ML) INJECTION at 16:58:00

## 2019-05-07 RX ADMIN — MIDAZOLAM HYDROCHLORIDE 2 MG: 1 INJECTION INTRAMUSCULAR; INTRAVENOUS at 16:28:00

## 2019-05-07 RX ADMIN — LIDOCAINE HYDROCHLORIDE 50 MG: 10 INJECTION, SOLUTION EPIDURAL; INFILTRATION; INTRACAUDAL; PERINEURAL at 16:32:00

## 2019-05-07 RX ADMIN — ROCURONIUM BROMIDE 50 MG: 10 INJECTION, SOLUTION INTRAVENOUS at 16:32:00

## 2019-05-07 RX ADMIN — SODIUM CHLORIDE, SODIUM LACTATE, POTASSIUM CHLORIDE, CALCIUM CHLORIDE: 600; 310; 30; 20 INJECTION, SOLUTION INTRAVENOUS at 16:21:00

## 2019-05-07 RX ADMIN — PHENYLEPHRINE HCL 100 MCG: 10 MG/ML VIAL (ML) INJECTION at 16:52:00

## 2019-05-07 RX ADMIN — SODIUM CHLORIDE, SODIUM LACTATE, POTASSIUM CHLORIDE, CALCIUM CHLORIDE: 600; 310; 30; 20 INJECTION, SOLUTION INTRAVENOUS at 19:45:00

## 2019-05-07 NOTE — OR PREOP
Dr Gilma Zimmer notified that RNs(3 tried in total) in pre-op cannot get IV access. Dr Gilma Zimmer at bedside attempting IV.  Dr Shanita Tyson currently at bedside

## 2019-05-07 NOTE — ANESTHESIA PREPROCEDURE EVALUATION
Anesthesia PreOp Note    HPI:     Frieda Hall is a 61year old female who presents for preoperative consultation requested by: Haley Cabrera MD    Date of Surgery: 5/7/2019    Procedure(s):  HIP TOTAL ARTHROPLASTY REVISION  Indication: mechanical loos Date Noted: 07/14/2009      Status post left hip replacement         Date Noted: 07/14/2009      Encounter for routine gynecological examination         Date Noted: 08/05/2008      Diverticulosis of colon         Date Noted: 08/17/2007      H/O: C-se tablet by mouth 2 (two) times daily with meals. Disp:  Rfl:  4/20/2019   Multiple Vitamin (MULTI VITAMIN DAILY OR) Take 1 tablet by mouth daily. Disp:  Rfl:  4/20/2019   omega-3 fatty acids 1000 MG Oral Cap Take 1,000 mg by mouth 2 (two) times daily. (four) hours as needed for Pain.  Disp: 60 tablet Rfl: 0   Pantoprazole Sodium 40 MG Oral Tab EC Take 1 tablet (40 mg total) by mouth every morning before breakfast. Disp: 30 tablet Rfl: 0   traMADol HCl 50 MG Oral Tab Take 1-2 tablets ( mg total) by Not on file        Forced sexual activity: Not on file    Other Topics      Concerns:        Caffeine Concern: Not Asked        Exercise: Not Asked        Seat Belt: Not Asked        Special Diet: Not Asked        Stress Concern: Not Asked        Weight Co 120/78   Pulse:  66   Resp:  18   Temp:  98.1 °F (36.7 °C)   TempSrc:  Oral   SpO2:  92%   Weight: 106.6 kg (235 lb) 104.3 kg (230 lb)   Height: 1.651 m (5' 5\") 1.676 m (5' 6\")        Anesthesia ROS/Med Hx and Physical Exam     Patient summary reviewed a

## 2019-05-07 NOTE — ANESTHESIA PROCEDURE NOTES
Peripheral IV  Date/Time: 5/7/2019 4:36 PM  Inserted by: Mike Henry CRNA    Placement  Needle size: 18 G  Laterality: left  Location: wrist  Local anesthetic: none  Site prep: alcohol  Technique: anatomical landmarks  Attempts: 1

## 2019-05-07 NOTE — H&P
History and Physical: Brief Update    I have reviewed the history and physical performed within the last 30 days on 4/24/19. I agree with this assessment and have no further additions. The consent form is signed and present in the chart.   The patient betsy Hammer Toe fixed    • REDUCTION OF LARGE BREAST Left     • TONSILLECTOMY          Social History:  Social History    Tobacco Use      Smoking status: Never Smoker      Smokeless tobacco: Never Used    Alcohol use:  Yes      Alcohol/week: 0.0 oz    Drug use: Calcium Carbonate-Vitamin D (CALCIUM-VITAMIN D) 500-200 MG-UNIT Oral Tab Take 1 tablet by mouth daily. Disp:  Rfl:    Multiple Vitamin (MULTI VITAMIN DAILY OR) Take 1 tablet by mouth daily.    Disp:  Rfl:    omega-3 fatty acids 1000 MG Oral Cap Take 1,000 Temp 97 °F (36.1 °C) (Tympanic)   Resp 18   Ht 64.75\"   Wt 235 lb   BMI 39.41 kg/m²  Estimated body mass index is 39.41 kg/m² as calculated from the following:    Height as of this encounter: 64.75\". Weight as of this encounter: 235 lb.    Vital signs COMPLETE  -     XR CHEST PA + LAT CHEST (CPT=71046); Future           Patient Instructions   EKG shows normal sinus rhythm. Check labs and xray today. After today's assessment  patient is at optimum health for surgery and relatively at low risk.  There AM     This note was created utilizing Dragon speech recognition software.  Please excuse any grammatical errors. Call my office if you have any questions regarding this note.           Other Notes      All notes         Instructions         Return if sympt Medication Changes         Ezetimibe 10 MG TAKE 1 TABLET(10 MG) BY MOUTH EVERY Morning         Medication List      Visit Diagnoses         Preoperative examination Z01.818          Right hip pain M25.551          Benign essential hypertension I10

## 2019-05-07 NOTE — FACE TO FACE NOTE
Face to Face Encounter    I (or a non-physician practitioner working in collaboration with me) had a face to face encounter with this patient during which a medical condition was addressed which is the primary reason for home health care on : 5/7/2019    T

## 2019-05-07 NOTE — ANESTHESIA PROCEDURE NOTES
Airway  Date/Time: 5/7/2019 4:35 PM  Urgency: elective    Airway not difficult    General Information and Staff    Patient location during procedure: OR  Anesthesiologist: Johnathan Aguilar MD  Resident/CRNA: Claudia Castillo CRNA  Performed: CRNA     Indicat

## 2019-05-08 PROCEDURE — 5A09357 ASSISTANCE WITH RESPIRATORY VENTILATION, LESS THAN 24 CONSECUTIVE HOURS, CONTINUOUS POSITIVE AIRWAY PRESSURE: ICD-10-PCS | Performed by: HOSPITALIST

## 2019-05-08 PROCEDURE — 99233 SBSQ HOSP IP/OBS HIGH 50: CPT | Performed by: HOSPITALIST

## 2019-05-08 NOTE — CM/SW NOTE
LORETA met with the patient at bedside. The patient lives with her son, she will stay with her parents while recovering in their 1 level home  with 1 steps to enter.  The patient responds being independent prior to admission with all ADL's, ambulation and drivi

## 2019-05-08 NOTE — PROGRESS NOTES
[facility]    Progress Note    Vonnie Ill Patient Status:  Inpatient    3/19/1960 MRN [id number]   Location [facility] 4W/SW/SE Attending Kwabena Varma MD   Hosp Day # 1 PCP Janell Paredes MD       Subjective:   Pam Mack arthroplasty. There is periprosthetic lucency of the left acetabulum which could reflect particle disease.   Dictated by (CST): Cesar Caceres MD on 5/07/2019 at 21:10     Approved by (CST): Cesar Caceres MD on 5/07/2019 at 21:13

## 2019-05-08 NOTE — DOWNTIME EVENT NOTE
The EMR was down for 1.5  hours on 5/8/2019. Charly Pritchard RN was responsible for completing the paper charting during this time period.      The following information was re-entered into the system by Charly CONTE: Flowsheet data, Intake and output and Orders

## 2019-05-08 NOTE — PHYSICAL THERAPY NOTE
PHYSICAL THERAPY EVALUATION - INPATIENT     Room Number: 420/420-A  Evaluation Date: 5/8/2019  Type of Evaluation: Initial   Physician Order: PT Eval and Treat    Presenting Problem: AMI revision TTWB RLE  Reason for Therapy: Mobility Dysfunction and Disc 3 previous hip sx's reported by pt with chronic DJD from sports and repetitive use, pt is a  and previous       Problem List  Principal Problem:    Failed total hip arthroplasty ([address]  Active Problems:    MEI (obstructive sleep ap my full mobility back.      PHYSICAL THERAPY EXAMINATION     OBJECTIVE  Precautions: MAI - posterior(Abd wedge, TTWB RLE)  Fall Risk: Standard fall risk    WEIGHT BEARING RESTRICTION  Weight Bearing Restriction: R lower extremity        R Lower Extremity: T nausea reported earlier pt denied nasuea with gait    Bed Mobility: Min A with RLE     Transfers: Min A with sit to stand with RW TTWB RLE    Exercise/Education Provided:  Bed mobility  Body mechanics  Energy conservation  Functional activity tolerated  Ga

## 2019-05-08 NOTE — PLAN OF CARE
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  - Provide timely, comple cardiac monitoring, monitor vital signs, obtain 12 lead EKG if indicated  - Evaluate effectiveness of antiarrhythmic and heart rate control medications as ordered  - Initiate emergency measures for life threatening arrhythmias  - Monitor electrolytes and a Initiate interventions, skin care algorithm/standards of care as needed  Outcome: Progressing  Goal: Incision(s), wounds(s) or drain site(s) healing without S/S of infection  Description  INTERVENTIONS:  - Assess and document risk factors for pressure ulce affected body part  Description  INTERVENTIONS:  - Support and protect limb and body alignment per provider's orders  - Instruct and reinforce with patient and family use of appropriate assistive device and precautions (e.g. spinal or hip dislocation preca safety including physical limitations  - Instruct pt to call for assistance with activity based on assessment  - Modify environment to reduce risk of injury  - Provide assistive devices as appropriate  - Consider OT/PT consult to assist with strengthening/

## 2019-05-08 NOTE — OCCUPATIONAL THERAPY NOTE
OCCUPATIONAL THERAPY EVALUATION - INPATIENT      Room Number: 420/420-A  Evaluation Date: 5/8/2019  Type of Evaluation: Initial  Presenting Problem: (RT TH revision)    Physician Order: IP Consult to Occupational Therapy  Reason for Therapy: ADL/IADL Dysfu hypercholesterolemia    S/P total hip arthroplasty      Past Medical History  Past Medical History:   Diagnosis Date   • Benign tumor of thyroid gland    • Cancer [facility])     breast cancer   • Deep vein thrombosis ([facility] [address] 2016   • High blood pressure    • High Automatic  Patient Position: Sitting      RANGE OF MOTION   Upper extremity ROM is within functional limits     STRENGTH ASSESSMENT  Upper extremity strength is within functional limits     ACTIVITIES OF DAILY LIVING ASSESSMENT  AM-PAC ‘6-Clicks’ Inpatient

## 2019-05-08 NOTE — PHYSICAL THERAPY NOTE
PT PM session: PT education with transfers and gait progression 300' with a RW with TTWB for the RLE with SBA. Pt denies dizziness or nausea during PT session but BP's are low 84/35 and RN is assisting with fluids and monitoring BP's.  Pt did present with 1

## 2019-05-08 NOTE — PROGRESS NOTES
[facility] HOSP - [facility]    Progress Note    Milli Momuriel Patient Status:  Surgery Admit - Inpt    3/19/1960 MRN [id number]   Location [address] [facility] Attending Max Parr MD   Hosp Day # 0 PCP Fanny Temple MD MONITOR. Pure hypercholesterolemia  CONT HOME MEDS.              Results:     Lab Results   Component Value Date    WBC 8.2 04/24/2019    HGB 14.6 04/24/2019    HCT 47.6 04/24/2019    .0 04/24/2019    CREATSERUM 0.78 04/24/2019    BUN 14 04/24/

## 2019-05-08 NOTE — ADDENDUM NOTE
Addendum  created 05/07/19 2032 by Carolina Antunez MD    Intraprocedure Meds edited, Intraprocedure Staff edited

## 2019-05-08 NOTE — PROGRESS NOTES
[facility]    Progress Note    Brant Mendoza Patient Status:  Inpatient    3/19/1960 MRN [id number]   Location [facility] 4W/SW/SE Attending Charlene Gao, [address] [facility] Day # 1 PCP Laura Plata MD       Subjective:   Ruddy Dutta HCl (REGLAN) injection 10 mg 10 mg Intravenous Q6H PRN   Prochlorperazine Edisylate (COMPAZINE) injection 10 mg 10 mg Intravenous Q6H PRN   famoTIDine (PEPCID) tab 20 mg 20 mg Oral BID   Or      famoTIDine (PEPCID) injection 20 mg 20 mg Intravenous BID   d imaged left total hip arthroplasty. There is periprosthetic lucency of the left acetabulum which could reflect particle disease.   Dictated by (CST): Hetal Cornejo MD on 5/07/2019 at 21:10     Approved by (CST): Hetal Cornejo MD on 5/07/2019 at 21:

## 2019-05-08 NOTE — OPERATIVE REPORT
[facility]    PATIENT'S NAME: Laura Plata   ATTENDING PHYSICIAN: Jenny Zarate MD   OPERATING PHYSICIAN: Salomon Mistry.  MD Roxy   PATIENT ACCOUNT#:   [id number]    LOCATION:  [address] [place] [address] #:   [id number]       DATE OF BIRTH: given stability that was obtained intraoperatively, this was a better option for her rather than to perform a full femoral revision. There were no signs of infection.     OPERATIVE TECHNIQUE:  The patient was admitted through the same-day surgery program o did demonstrate a relative increase in femoral anteversion of approximately 25 degrees. A pocket in the acetabulum was developed. The femur was able to be translated anteriorly. The previously placed polyethylene liner was removed.   Once the liner had b inequality. The 40 mm +12 ceramic head with titanium sleeve was subsequently attached. The hip was reduced.   The surgical site was irrigated, and the posterior capsule was reapproximated to the posterior aspect of the abductors with interrupted #1 Vicryl

## 2019-05-08 NOTE — PLAN OF CARE
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 us to know as we care for you?  I will be going home and my mom will be helping me  - baseline  Description  INTERVENTIONS:  - Continuous cardiac monitoring, monitor vital signs, obtain 12 lead EKG if indicated  - Evaluate effectiveness of antiarrhythmic and heart rate control medications as ordered  - Initiate emergency measures for life t development  - Assess and document skin integrity  - Assess and document dressing/incision, wound bed, drain sites and surrounding tissue  - Implement wound care per orders  - Initiate isolation precautions as appropriate  - Initiate Pressure Ulcer prevent appropriate assistive device and precautions (e.g. spinal or hip dislocation precautions)  Outcome: Progressing     Has not been up OOB post op.  PT consult in am    Problem: Impaired Functional Mobility  Goal: Achieve highest/safest level of mobility/gait safety including physical limitations  - Instruct pt to call for assistance with activity based on assessment  - Modify environment to reduce risk of injury  - Provide assistive devices as appropriate  - Consider OT/PT consult to assist with strengthening/

## 2019-05-08 NOTE — RESPIRATORY THERAPY NOTE
Per pt, she is currently wearing a CPAP of 11 at home. Pt was placed on CPAP 11 with 2L O2 bleed-in. Pt is comfortable and resting at this time.

## 2019-05-08 NOTE — ANESTHESIA POSTPROCEDURE EVALUATION
Patient: Adal Garcia    Procedure Summary     Date:  05/07/19 Room / Location:  [address] [place] [address] MAIN OR 10 / [address] [facility] MAIN OR    Anesthesia Start:  1628 Anesthesia Stop:  1955    Procedure:  HIP TOTAL ARTHROPLASTY REVISION (Right ) Diagnosis:  (mechanical loosening of

## 2019-05-09 PROCEDURE — 99233 SBSQ HOSP IP/OBS HIGH 50: CPT | Performed by: HOSPITALIST

## 2019-05-09 NOTE — OCCUPATIONAL THERAPY NOTE
OCCUPATIONAL THERAPY TREATMENT NOTE - INPATIENT    Room Number: 420/420-A         Presenting Problem: (RT TH revision)     Problem List  Principal Problem:    Failed total hip arthroplasty ([facility] [address]  Active Problems:    MEI (obstructive sleep apnea)    Benign Inpatient Daily Activity Short Form  How much help from another person does the patient currently need…  -   Putting on and taking off regular lower body clothing?: None  -   Bathing (including washing, rinsing, drying)?: None  -   Toileting, which include

## 2019-05-09 NOTE — FACE TO FACE NOTE
[facility]    Face to Face Encounter Note    Talha Sefartun Patient Status:  Inpatient    3/19/1960 MRN [id number]   Location [facility] 4W/SW/SE Attending Erma Dutta, [address] [facility] Day # 2 PCP Christiano Aguillon MD       I, or a no care, physical therapy and/or speech therapy or continues to need occupational therapy. The patient is under my care, and I have initiated the establishment of the plan of care.   This physician will be followed by a physician who will periodically review

## 2019-05-09 NOTE — PHYSICAL THERAPY NOTE
PHYSICAL THERAPY HIP TREATMENT NOTE - INPATIENT    Room Number: 420/420-A            Presenting Problem: AMI revision TTWB RLE    Problem List  Principal Problem:    Failed total hip arthroplasty ([address]  Active Problems:    MEI (obstructive sleep apnea) adjusting bedclothes, sheets and blankets)?: A Little   -   Sitting down on and standing up from a chair with arms (e.g., wheelchair, bedside commode, etc.): A Little   -   Moving from lying on back to sitting on the side of the bed?: A Little   How much h Current Status In progress   Goal #6    Goal #6  Current Status

## 2019-05-09 NOTE — PLAN OF CARE
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  - Provide timely, complete monitoring, monitor vital signs, obtain 12 lead EKG if indicated  - Evaluate effectiveness of antiarrhythmic and heart rate control medications as ordered  - Initiate emergency measures for life threatening arrhythmias  - Monitor electrolytes and administe interventions, skin care algorithm/standards of care as needed  Outcome: Progressing  Goal: Incision(s), wounds(s) or drain site(s) healing without S/S of infection  Description  INTERVENTIONS:  - Assess and document risk factors for pressure ulcer develop body part  Description  INTERVENTIONS:  - Support and protect limb and body alignment per provider's orders  - Instruct and reinforce with patient and family use of appropriate assistive device and precautions (e.g. spinal or hip dislocation precautions) including physical limitations  - Instruct pt to call for assistance with activity based on assessment  - Modify environment to reduce risk of injury  - Provide assistive devices as appropriate  - Consider OT/PT consult to assist with strengthening/mobilit

## 2019-05-09 NOTE — PAYOR COMM NOTE
--------------  ADMISSION REVIEW     Payor: [facility] PPO  Subscriber #:  [id number]  Authorization Number: [id number]    Admit date: 5/7/19  Admit time: 2151  Requesting inpatient authorization 5/7 to current. Thank you.         Admitting Physician: Holley Mendoza, discrepancy; however, the decision was made that given stability that was obtained intraoperatively, this was a better option for her rather than to perform a full femoral revision.   There were no signs of infection.     OPERATIVE TECHNIQUE:  The patient w however, was intact. As previously mentioned, the hip did demonstrate a relative increase in femoral anteversion of approximately 25 degrees. A pocket in the acetabulum was developed. The femur was able to be translated anteriorly.   The previously place appeared to be stable despite the slight leg length inequality. The 40 mm +12 ceramic head with titanium sleeve was subsequently attached. The hip was reduced.   The surgical site was irrigated, and the posterior capsule was reapproximated to the posterio Intravenous PRN   sodium chloride 0.9% IV bolus 500 mL 500 mL Intravenous Once PRN   lactated ringers infusion   Intravenous Continuous   aspirin tab 325 mg 325 mg Oral BID   HYDROmorphone HCl (DILAUDID) 1 MG/ML injection 0.2 mg 0.2 mg Intravenous Q2H PRN (OXY-IR) cap/tab 10 mg 10 mg Oral Q4H PRN               Lab Results   Component Value Date     WBC 8.2 04/24/2019     HGB 10.6 (L) 05/08/2019     HCT 33.5 (L) 05/08/2019     .0 04/24/2019     CREATSERUM 1.18 (H) 05/08/2019     BUN 21 (H) 05/08/2019 ASA DVT PROPHYLAXIS, PT/OT.         MEI (obstructive sleep apnea)  CONT CPAP, MEI PROTOCOL.         Pure hypercholesterolemia  CONT HOME MEDS.       Hypotension - bun/creat up. Likely due to dehydration.  Cont ivfs .      Anemia acute blood loss - repeat cbc i

## 2019-05-09 NOTE — CM/SW NOTE
LORETA confirmed with [facility] that patient is getting discharged today, 5.9.19. [address] orders entered.     [facility] [address] [facility], [address] [place] [address]

## 2019-05-09 NOTE — PROGRESS NOTES
[facility]    Progress Note    Dhavalarmida Aguilera Patient Status:  Inpatient    3/19/1960 MRN [id number]   Location [facility] 4W/SW/SE Attending Dhaval Bashir, [address] [facility] Day # 2 PCP Jadyn Mercado MD       Subjective:   Raffi Daily Metoclopramide HCl (REGLAN) injection 10 mg 10 mg Intravenous Q6H PRN   Prochlorperazine Edisylate (COMPAZINE) injection 10 mg 10 mg Intravenous Q6H PRN   famoTIDine (PEPCID) tab 20 mg 20 mg Oral BID   Or      famoTIDine (PEPCID) injection 20 mg 20 mg In imaged left total hip arthroplasty. There is periprosthetic lucency of the left acetabulum which could reflect particle disease.   Dictated by (CST): Blaine Hubbard MD on 5/07/2019 at 21:10     Approved by (CST): Blaine Hubbard MD on 5/07/2019 at 21:

## 2019-05-10 ENCOUNTER — APPOINTMENT (OUTPATIENT)
Dept: CT IMAGING | Facility: HOSPITAL | Age: 59
DRG: 467 | End: 2019-05-10
Attending: HOSPITALIST
Payer: COMMERCIAL

## 2019-05-10 ENCOUNTER — APPOINTMENT (OUTPATIENT)
Dept: CV DIAGNOSTICS | Facility: HOSPITAL | Age: 59
DRG: 467 | End: 2019-05-10
Attending: HOSPITALIST
Payer: COMMERCIAL

## 2019-05-10 VITALS
HEIGHT: 66 IN | BODY MASS INDEX: 36.96 KG/M2 | OXYGEN SATURATION: 94 % | WEIGHT: 230 LBS | TEMPERATURE: 99 F | HEART RATE: 73 BPM | SYSTOLIC BLOOD PRESSURE: 98 MMHG | DIASTOLIC BLOOD PRESSURE: 48 MMHG | RESPIRATION RATE: 16 BRPM

## 2019-05-10 PROCEDURE — 93306 TTE W/DOPPLER COMPLETE: CPT | Performed by: HOSPITALIST

## 2019-05-10 PROCEDURE — 71260 CT THORAX DX C+: CPT | Performed by: HOSPITALIST

## 2019-05-10 RX ORDER — MAGNESIUM OXIDE 400 MG (241.3 MG MAGNESIUM) TABLET
400 TABLET ONCE
Status: COMPLETED | OUTPATIENT
Start: 2019-05-10 | End: 2019-05-10

## 2019-05-10 NOTE — PLAN OF CARE
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 us to know as we care for you?   - Provide timely, complete, and accurate informatio signs, obtain 12 lead EKG if indicated  - Evaluate effectiveness of antiarrhythmic and heart rate control medications as ordered  - Initiate emergency measures for life threatening arrhythmias  - Monitor electrolytes and administer replacement therapy as o algorithm/standards of care as needed  Outcome: Progressing  Goal: Incision(s), wounds(s) or drain site(s) healing without S/S of infection  Description  INTERVENTIONS:  - Assess and document risk factors for pressure ulcer development  - Assess and docume part  Description  INTERVENTIONS:  - Support and protect limb and body alignment per provider's orders  - Instruct and reinforce with patient and family use of appropriate assistive device and precautions (e.g. spinal or hip dislocation precautions)  Shani Potts physical limitations  - Instruct pt to call for assistance with activity based on assessment  - Modify environment to reduce risk of injury  - Provide assistive devices as appropriate  - Consider OT/PT consult to assist with strengthening/mobility  - Encou

## 2019-05-10 NOTE — PLAN OF CARE
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 us to know as we care for you? I'm going home to my parents till I get better.   - Pr or at baseline  Description  INTERVENTIONS:  - Continuous cardiac monitoring, monitor vital signs, obtain 12 lead EKG if indicated  - Evaluate effectiveness of antiarrhythmic and heart rate control medications as ordered  - Initiate emergency measures for development  - Assess and document skin integrity  - Monitor for areas of redness and/or skin breakdown  - Initiate interventions, skin care algorithm/standards of care as needed  Outcome: Adequate for Discharge  Goal: Incision(s), wounds(s) or drain site( activity as appropriate  - Communicate ordered activity level and limitations with patient/family  Outcome: Adequate for Discharge  Goal: Maintain proper alignment of affected body part  Description  INTERVENTIONS:  - Support and protect limb and body alig fall injury  Description  INTERVENTIONS:  - Assess pt frequently for physical needs  - Identify cognitive and physical deficits and behaviors that affect risk of falls.   - [place] fall precautions as indicated by assessment.  - Educate pt/family on patie

## 2019-05-10 NOTE — CM/SW NOTE
LORETA confirmed with [facility] that patient is getting discharged today, 5.10.19. [facility] AT [facility] [address] orders entered.     [address]

## 2019-05-10 NOTE — PAYOR COMM NOTE
--------------  CONTINUED STAY REVIEW    Payor: [facility] PPO  Subscriber #:  [id number]  Authorization Number: [id number]    Admit date: 5/7/19  Admit time: 2151    Admitting Physician: Bin Cantrell MD  Attending Physician:  DO Misael Worthy 5/10/2019 1044 Given 20 mg Oral Beata Hickman RN    5/9/2019 2205 Given 20 mg Oral Emeterio Mejias RN      gabapentin (NEURONTIN) cap 100 mg     Date Action Dose Route User    5/10/2019 1044 Given 100 mg Oral Beata Hickman RN    5/9/20 Electronically signed by Aleida Cardona RN at 5/10/2019  7:53 AM

## 2019-05-10 NOTE — PROGRESS NOTES
Called by RN to review EKG. Patient apparently had episode of PAT earlier this evening when ambulating. She was asymptomatic. EKG shows poor R wave progression anteriorly. But comapred to EKG from 4/18 I see no change.   Patient did have dobutamine stre

## 2019-05-10 NOTE — PHYSICAL THERAPY NOTE
PHYSICAL THERAPY HIP TREATMENT NOTE - INPATIENT    Room Number: 420/420-A            Presenting Problem: AMI revision TTWB RLE    Problem List  Principal Problem:    Failed total hip arthroplasty ([address]  Active Problems:    MEI (obstructive sleep apnea) Turning over in bed (including adjusting bedclothes, sheets and blankets)?: A Little   -   Sitting down on and standing up from a chair with arms (e.g., wheelchair, bedside commode, etc.): A Little   -   Moving from lying on back to sitting on the side of in preparation for discharge.    Goal #5   Current Status In progress   Goal #6    Goal #6  Current Status

## 2019-05-11 NOTE — PAYOR COMM NOTE
--------------  DISCHARGE REVIEW    Payor: [employer]AMIKO EASTON  Subscriber #:  [id number]  Authorization Number: [id number]    Admit date: 5/7/19  Admit time:  2151  Discharge Date: 5/10/2019  6:42 PM     Admitting Physician: Carmen Tello MD  Attending Physician:

## 2019-05-21 RX ORDER — ROSUVASTATIN CALCIUM 5 MG/1
TABLET, COATED ORAL
Qty: 30 TABLET | Refills: 0 | Status: SHIPPED | OUTPATIENT
Start: 2019-05-21 | End: 2019-06-18

## 2019-05-21 NOTE — TELEPHONE ENCOUNTER
No future appointments.      Last Appointment:  12/21/2018  Cholesterol, Total (mg/dL)   Date Value   02/23/2019 162   08/16/2017 212     Total Cholesterol (mg/dL)   Date Value   08/15/2018 225 (H)     HDL Cholesterol (mg/dL)   Date Value   02/23/2019 57

## 2019-05-21 NOTE — TELEPHONE ENCOUNTER
Pt is due for follow up appointment and/or  fasting labs    Please have pt  schedule apptointment according to follow up guidelines. Refills until appointmentt ok.

## 2019-05-22 NOTE — TELEPHONE ENCOUNTER
Pt informed of below recommendations and appt scheduled.   Future Appointments   Date Time Provider Katie Poe   6/25/2019  2:40 PM Melissa Culver MD [facility] [place] [facility]

## 2019-06-12 ENCOUNTER — PATIENT OUTREACH (OUTPATIENT)
Dept: CASE MANAGEMENT | Age: 59
End: 2019-06-12

## 2019-06-12 DIAGNOSIS — Z96.641 STATUS POST TOTAL REPLACEMENT OF RIGHT HIP: ICD-10-CM

## 2019-06-12 PROCEDURE — 1111F DSCHRG MED/CURRENT MED MERGE: CPT

## 2019-06-17 ENCOUNTER — TELEPHONE (OUTPATIENT)
Dept: FAMILY MEDICINE CLINIC | Facility: CLINIC | Age: 59
End: 2019-06-17

## 2019-06-18 RX ORDER — ROSUVASTATIN CALCIUM 5 MG/1
TABLET, COATED ORAL
Qty: 30 TABLET | Refills: 0 | Status: SHIPPED | OUTPATIENT
Start: 2019-06-18 | End: 2019-06-25

## 2019-06-18 NOTE — TELEPHONE ENCOUNTER
Future appt:     Your appointments     Date & Time Appointment Department [facility])    Jun 25, 2019  2:40 PM CDT Exam - Established Patient with Paty George MD [facility], [address]

## 2019-06-25 ENCOUNTER — TELEPHONE (OUTPATIENT)
Dept: FAMILY MEDICINE CLINIC | Facility: CLINIC | Age: 59
End: 2019-06-25

## 2019-06-25 ENCOUNTER — OFFICE VISIT (OUTPATIENT)
Dept: FAMILY MEDICINE CLINIC | Facility: CLINIC | Age: 59
End: 2019-06-25
Payer: COMMERCIAL

## 2019-06-25 VITALS
HEART RATE: 82 BPM | TEMPERATURE: 98 F | BODY MASS INDEX: 38.89 KG/M2 | SYSTOLIC BLOOD PRESSURE: 118 MMHG | WEIGHT: 230.63 LBS | RESPIRATION RATE: 18 BRPM | DIASTOLIC BLOOD PRESSURE: 78 MMHG | HEIGHT: 64.75 IN

## 2019-06-25 DIAGNOSIS — I10 BENIGN ESSENTIAL HYPERTENSION: ICD-10-CM

## 2019-06-25 DIAGNOSIS — E53.8 VITAMIN B12 DEFICIENCY: ICD-10-CM

## 2019-06-25 DIAGNOSIS — E78.00 PURE HYPERCHOLESTEROLEMIA: Primary | ICD-10-CM

## 2019-06-25 DIAGNOSIS — D50.8 OTHER IRON DEFICIENCY ANEMIA: ICD-10-CM

## 2019-06-25 DIAGNOSIS — E04.1 NONTOXIC UNINODULAR GOITER: ICD-10-CM

## 2019-06-25 PROCEDURE — 99214 OFFICE O/P EST MOD 30 MIN: CPT | Performed by: FAMILY MEDICINE

## 2019-06-25 RX ORDER — EZETIMIBE 10 MG/1
TABLET ORAL
Qty: 30 TABLET | Refills: 5 | Status: SHIPPED | OUTPATIENT
Start: 2019-06-25 | End: 2019-07-08

## 2019-06-25 RX ORDER — ROSUVASTATIN CALCIUM 5 MG/1
TABLET, COATED ORAL
Qty: 30 TABLET | Refills: 5 | Status: SHIPPED | OUTPATIENT
Start: 2019-06-25 | End: 2019-07-20

## 2019-06-25 NOTE — TELEPHONE ENCOUNTER
Gavin Fuentes @ [facility] in regards to rx on metoprolol for pt. Pharmacist given ok to change from sprinkle to capsule.

## 2019-06-25 NOTE — PROGRESS NOTES
[facility] [place]  PROGRESS NOTE        HPI:   This is a 61year old female coming in for Patient presents with:  Cholesterol: f/u    HPI  Had her hip revisoin in may and now feels so much better and has a 7 week post op check tomorrow,   Not s Globulin  2.7 (L) 2.8 - 4.4 g/dL    A/G Ratio 1.1 1.0 - 2.0   IRON AND TIBC   Result Value Ref Range    Iron 45 (L) 50 - 170 ug/dL    Transferrin 230 200 - 360 mg/dL    Total Iron Binding Capacity 343 240 - 450 ug/dL    % Saturation 13 (L) 15 - 50 %   BASI BLOOD TYPE Positive    ANTIBODY SCREEN   Result Value Ref Range    Antibody Screen Negative    SURGICAL PATHOLOGY TISSUE   Result Value Ref Range    Case Report       Surgical Pathology                                Case: [id number] two blocks labeled FSA1 and FSA2. The frozen section diagnosis is: \"Representative sections submitted showed no significant acute inflammation. \" The results were conveyed through Murali Whitfield to Dr. Sade Delacruz by Dr. Teri Bob at 5:54 p.m.  Representative sections a x10 (3) uL    Neutrophil Absolute 6.83 1.50 - 7.70 x10(3) uL    Lymphocyte Absolute 1.34 1.00 - 4.00 x10(3) uL    Monocyte Absolute 0.52 0.10 - 1.00 x10(3) uL    Eosinophil Absolute 0.22 0.00 - 0.70 x10(3) uL    Basophil Absolute 0.01 0.00 - 0.20 x10(3) uL SURGICAL HISTORY Left 2017    Hammer Toe fixed    • REDUCTION LEFT     • TONSILLECTOMY     • TOTAL HIP REPLACEMENT Right     @[facility]   • TOTAL HIP REPLACEMENT Right 05/16/2018    revision   • TOTAL HIP REPLACEMENT Left      Social History:  Faiza Hardin Hour Sprinkle Take 25 mg by mouth daily. Disp: 30 capsule Rfl: 6   aspirin 81 MG Oral Tab Take 81 mg by mouth daily. Disp:  Rfl:    docusate sodium 100 MG Oral Cap Take 1 capsule (100 mg total) by mouth 2 (two) times daily.  Disp: 60 capsule Rfl: 2   amoxic arthroplasty ([facility]     S/P total hip arthroplasty      REVIEW OF SYSTEMS:   Review of Systems   Constitutional: Negative. HENT: Negative. Eyes: Negative. Respiratory: Negative. Cardiovascular: Negative. Gastrointestinal: Negative.     Minor Sachs and dry. She is not diaphoretic. Psychiatric: She has a normal mood and affect. Her behavior is normal. Judgment and thought content normal.       ASSESSMENT AND PLAN:   Manuel Pearl was seen today for cholesterol.     Diagnoses and all orders for this visit: in face to face counseling and coordination of care. Patients questions were answered and patient seemed comfortable with plan of care. No problem-specific Assessment & Plan notes found for this encounter.        future appointment:    No follow-ups o

## 2019-07-06 NOTE — TELEPHONE ENCOUNTER
Need to clarify how patient is taking medication? EMR states take every morning and refill request is for every evening. Message left for patient to return call.

## 2019-07-06 NOTE — TELEPHONE ENCOUNTER
Refill request from [facility] [place] for YASMINE Araya. Future appt:     Your appointments     Date & Time Appointment Department [facility])    Aug 01, 2019  8:00 AM CDT Laboratory Visit with [facility] ([facility] Ref [facility] Collin Boast

## 2019-07-08 RX ORDER — EZETIMIBE 10 MG/1
TABLET ORAL
Qty: 90 TABLET | Refills: 1 | Status: SHIPPED | OUTPATIENT
Start: 2019-07-08 | End: 2020-01-13

## 2019-07-17 RX ORDER — FERROUS SULFATE TAB EC 324 MG (65 MG FE EQUIVALENT) 324 (65 FE) MG
TABLET DELAYED RESPONSE ORAL
COMMUNITY
End: 2019-12-05

## 2019-07-17 NOTE — PROGRESS NOTES
Initial Post Discharge Follow Up   Discharge Date: 5/10/19  Contact Date: 7/17/2019    Consent Verification:  Assessment Completed With: Patient  HIPAA Verified? Yes    Discharge Dx:   Right hip mechanical loosening, s/p R hip revision Dr. Elías Bryant.      DENICE she has seen the surgeon's PA a few times since the surgery. Pt denies any barriers to keeping/getting to HFU appts.         Your appointments     Date & Time Appointment Department [facility])    Aug 01, 2019  8:00 AM CDT Laboratory Visit with REF Sharan Robledo

## 2019-07-20 DIAGNOSIS — O22.30 DEEP PHLEBOTHROMBOSIS, ANTEPARTUM, WITH DELIVERY (HCC): ICD-10-CM

## 2019-07-20 DIAGNOSIS — Z90.10 STEWART-TREVES SYNDROME (HCC): Primary | ICD-10-CM

## 2019-07-20 DIAGNOSIS — I82.409 DEEP PHLEBOTHROMBOSIS, ANTEPARTUM, WITH DELIVERY (HCC): ICD-10-CM

## 2019-07-20 DIAGNOSIS — C50.919 STEWART-TREVES SYNDROME (HCC): Primary | ICD-10-CM

## 2019-07-20 RX ORDER — ROSUVASTATIN CALCIUM 5 MG/1
TABLET, COATED ORAL
Qty: 30 TABLET | Refills: 0 | Status: SHIPPED | OUTPATIENT
Start: 2019-07-20 | End: 2019-09-14

## 2019-07-29 ENCOUNTER — PATIENT MESSAGE (OUTPATIENT)
Dept: FAMILY MEDICINE CLINIC | Facility: CLINIC | Age: 59
End: 2019-07-29

## 2019-07-29 NOTE — TELEPHONE ENCOUNTER
From: Jo Mancini  To: Santosh Hopkins MD  Sent: 7/29/2019 9:14 AM CDT  Subject: Other    Morning Dr Klaus Connell    I was wondering if you got the blood work request from Dr Andrés Staton office so I could have that test done this Thursday as I am having y

## 2019-08-01 ENCOUNTER — LABORATORY ENCOUNTER (OUTPATIENT)
Dept: LAB | Age: 59
End: 2019-08-01
Attending: FAMILY MEDICINE
Payer: COMMERCIAL

## 2019-08-01 DIAGNOSIS — I10 BENIGN ESSENTIAL HYPERTENSION: ICD-10-CM

## 2019-08-01 DIAGNOSIS — Z90.10 STEWART-TREVES SYNDROME (HCC): ICD-10-CM

## 2019-08-01 DIAGNOSIS — C50.919 STEWART-TREVES SYNDROME (HCC): ICD-10-CM

## 2019-08-01 DIAGNOSIS — E78.00 PURE HYPERCHOLESTEROLEMIA: ICD-10-CM

## 2019-08-01 DIAGNOSIS — E04.1 NONTOXIC UNINODULAR GOITER: ICD-10-CM

## 2019-08-01 DIAGNOSIS — I82.409 DEEP PHLEBOTHROMBOSIS, ANTEPARTUM, WITH DELIVERY (HCC): ICD-10-CM

## 2019-08-01 DIAGNOSIS — O22.30 DEEP PHLEBOTHROMBOSIS, ANTEPARTUM, WITH DELIVERY (HCC): ICD-10-CM

## 2019-08-01 DIAGNOSIS — D50.8 OTHER IRON DEFICIENCY ANEMIA: ICD-10-CM

## 2019-08-01 LAB
ALBUMIN SERPL-MCNC: 4 G/DL (ref 3.4–5)
ALBUMIN/GLOB SERPL: 1 {RATIO} (ref 1–2)
ALP LIVER SERPL-CCNC: 137 U/L (ref 46–118)
ALT SERPL-CCNC: 40 U/L (ref 13–56)
ANION GAP SERPL CALC-SCNC: 6 MMOL/L (ref 0–18)
AST SERPL-CCNC: 39 U/L (ref 15–37)
BASOPHILS # BLD AUTO: 0.04 X10(3) UL (ref 0–0.2)
BASOPHILS NFR BLD AUTO: 0.5 %
BILIRUB SERPL-MCNC: 0.5 MG/DL (ref 0.1–2)
BILIRUB UR QL STRIP.AUTO: NEGATIVE
BRCA1 C.185DELAG BLD/T QL: 19.2 U/ML (ref ?–38)
BUN BLD-MCNC: 16 MG/DL (ref 7–18)
BUN/CREAT SERPL: 17 (ref 10–20)
CALCIUM BLD-MCNC: 10.3 MG/DL (ref 8.5–10.1)
CANCER AG15-3 SERPL-ACNC: 10.9 U/ML (ref ?–35)
CHLORIDE SERPL-SCNC: 107 MMOL/L (ref 98–112)
CHOLEST SMN-MCNC: 174 MG/DL (ref ?–200)
CK SERPL-CCNC: 148 U/L (ref 26–192)
CO2 SERPL-SCNC: 25 MMOL/L (ref 21–32)
COLOR UR AUTO: YELLOW
CREAT BLD-MCNC: 0.94 MG/DL (ref 0.55–1.02)
DEPRECATED HBV CORE AB SER IA-ACNC: 93.4 NG/ML (ref 18–340)
DEPRECATED RDW RBC AUTO: 46.3 FL (ref 35.1–46.3)
EOSINOPHIL # BLD AUTO: 0.18 X10(3) UL (ref 0–0.7)
EOSINOPHIL NFR BLD AUTO: 2.2 %
ERYTHROCYTE [DISTWIDTH] IN BLOOD BY AUTOMATED COUNT: 14.6 % (ref 11–15)
GLOBULIN PLAS-MCNC: 4.1 G/DL (ref 2.8–4.4)
GLUCOSE BLD-MCNC: 85 MG/DL (ref 70–99)
GLUCOSE UR STRIP.AUTO-MCNC: NEGATIVE MG/DL
HCT VFR BLD AUTO: 48.7 % (ref 35–48)
HDLC SERPL-MCNC: 54 MG/DL (ref 40–59)
HGB BLD-MCNC: 15.2 G/DL (ref 12–16)
IMM GRANULOCYTES # BLD AUTO: 0.04 X10(3) UL (ref 0–1)
IMM GRANULOCYTES NFR BLD: 0.5 %
IRON SATURATION: 17 % (ref 15–50)
IRON SERPL-MCNC: 81 UG/DL (ref 50–170)
KETONES UR STRIP.AUTO-MCNC: NEGATIVE MG/DL
LDLC SERPL CALC-MCNC: 81 MG/DL (ref ?–100)
LYMPHOCYTES # BLD AUTO: 2.37 X10(3) UL (ref 1–4)
LYMPHOCYTES NFR BLD AUTO: 29.5 %
M PROTEIN MFR SERPL ELPH: 8.1 G/DL (ref 6.4–8.2)
MCH RBC QN AUTO: 27.1 PG (ref 26–34)
MCHC RBC AUTO-ENTMCNC: 31.2 G/DL (ref 31–37)
MCV RBC AUTO: 87 FL (ref 80–100)
MONOCYTES # BLD AUTO: 0.46 X10(3) UL (ref 0.1–1)
MONOCYTES NFR BLD AUTO: 5.7 %
NEUTROPHILS # BLD AUTO: 4.94 X10 (3) UL (ref 1.5–7.7)
NEUTROPHILS # BLD AUTO: 4.94 X10(3) UL (ref 1.5–7.7)
NEUTROPHILS NFR BLD AUTO: 61.6 %
NITRITE UR QL STRIP.AUTO: NEGATIVE
NONHDLC SERPL-MCNC: 120 MG/DL (ref ?–130)
OSMOLALITY SERPL CALC.SUM OF ELEC: 286 MOSM/KG (ref 275–295)
PH UR STRIP.AUTO: 5 [PH] (ref 4.5–8)
PLATELET # BLD AUTO: 111 10(3)UL (ref 150–450)
POTASSIUM SERPL-SCNC: 4.2 MMOL/L (ref 3.5–5.1)
PROT UR STRIP.AUTO-MCNC: NEGATIVE MG/DL
RBC # BLD AUTO: 5.6 X10(6)UL (ref 3.8–5.3)
RBC UR QL AUTO: NEGATIVE
SODIUM SERPL-SCNC: 138 MMOL/L (ref 136–145)
SP GR UR STRIP.AUTO: 1.02 (ref 1–1.03)
TOTAL IRON BINDING CAPACITY: 484 UG/DL (ref 240–450)
TRANSFERRIN SERPL-MCNC: 325 MG/DL (ref 200–360)
TRIGL SERPL-MCNC: 194 MG/DL (ref 30–149)
TSI SER-ACNC: 0.97 MIU/ML (ref 0.36–3.74)
URATE SERPL-MCNC: 5.7 MG/DL (ref 2.6–6)
UROBILINOGEN UR STRIP.AUTO-MCNC: <2 MG/DL
VIT B12 SERPL-MCNC: 448 PG/ML (ref 193–986)
VIT D+METAB SERPL-MCNC: 36.5 NG/ML (ref 30–100)
VLDLC SERPL CALC-MCNC: 39 MG/DL (ref 0–30)
WBC # BLD AUTO: 8 X10(3) UL (ref 4–11)

## 2019-08-01 PROCEDURE — 81001 URINALYSIS AUTO W/SCOPE: CPT | Performed by: FAMILY MEDICINE

## 2019-08-01 PROCEDURE — 82607 VITAMIN B-12: CPT | Performed by: FAMILY MEDICINE

## 2019-08-01 PROCEDURE — 36415 COLL VENOUS BLD VENIPUNCTURE: CPT | Performed by: FAMILY MEDICINE

## 2019-08-01 PROCEDURE — 80050 GENERAL HEALTH PANEL: CPT | Performed by: FAMILY MEDICINE

## 2019-08-01 PROCEDURE — 84550 ASSAY OF BLOOD/URIC ACID: CPT | Performed by: FAMILY MEDICINE

## 2019-08-01 PROCEDURE — 82306 VITAMIN D 25 HYDROXY: CPT | Performed by: FAMILY MEDICINE

## 2019-08-01 PROCEDURE — 83540 ASSAY OF IRON: CPT | Performed by: FAMILY MEDICINE

## 2019-08-01 PROCEDURE — 82550 ASSAY OF CK (CPK): CPT | Performed by: FAMILY MEDICINE

## 2019-08-01 PROCEDURE — 83550 IRON BINDING TEST: CPT | Performed by: FAMILY MEDICINE

## 2019-08-01 PROCEDURE — 82728 ASSAY OF FERRITIN: CPT | Performed by: FAMILY MEDICINE

## 2019-08-01 PROCEDURE — 80061 LIPID PANEL: CPT | Performed by: FAMILY MEDICINE

## 2019-08-03 DIAGNOSIS — R79.89 ELEVATED LFTS: ICD-10-CM

## 2019-08-03 DIAGNOSIS — D69.6 THROMBOCYTOPENIA (HCC): Primary | ICD-10-CM

## 2019-08-05 ENCOUNTER — TELEPHONE (OUTPATIENT)
Dept: FAMILY MEDICINE CLINIC | Facility: CLINIC | Age: 59
End: 2019-08-05

## 2019-08-05 NOTE — TELEPHONE ENCOUNTER
----- Message from Pavel Jim MD sent at 8/3/2019 12:06 PM CDT -----  Iron level is improving continue supplementation x 3 months. Vitamin d is low normal. Continue supplementation.    Vitamin b12 is low normal recommedn increase dietary sources like s

## 2019-08-05 NOTE — TELEPHONE ENCOUNTER
Pt informed of below results/recommendations. Pt verbalized understanding and stated will call in a week or so to schedule future lab appt for recommended re-checks.

## 2019-08-06 ENCOUNTER — TELEPHONE (OUTPATIENT)
Dept: FAMILY MEDICINE CLINIC | Facility: CLINIC | Age: 59
End: 2019-08-06

## 2019-08-07 NOTE — TELEPHONE ENCOUNTER
Spoke with patient and she was notified of sleep study dates. Copy of sleep studies for patient are available to . Pt verbalized understanding.

## 2019-08-07 NOTE — TELEPHONE ENCOUNTER
----- Message from Siva Monahan sent at 8/7/2019  8:38 AM CDT -----  ryc from Tuesday        Date of last Sleep study for insurance purposes          Siva Monahan, 08/07/19, 8:39 AM     [phone]    OKAY to leave mess of date on phone as she is malena

## 2019-08-14 ENCOUNTER — TELEPHONE (OUTPATIENT)
Dept: FAMILY MEDICINE CLINIC | Facility: CLINIC | Age: 59
End: 2019-08-14

## 2019-08-14 NOTE — TELEPHONE ENCOUNTER
copy of recent BW results  from 8/1/2019   and EKG results from 4/24/2019  faxed to [facility] @ fax# [phone] attn: [facility] per request pending an appointment

## 2019-09-14 NOTE — TELEPHONE ENCOUNTER
Last refill 7/20/19  Future appt:No future appointments.     Last Appointment with provider:   6/25/2019  Last appointment at [facility]:  6/25/2019  Cholesterol, Total (mg/dL)   Date Value   08/01/2019 174   08/16/2017 212     Total Cholesterol (mg/dL)

## 2019-09-16 RX ORDER — ROSUVASTATIN CALCIUM 5 MG/1
TABLET, COATED ORAL
Qty: 30 TABLET | Refills: 3 | Status: SHIPPED | OUTPATIENT
Start: 2019-09-16 | End: 2020-01-08

## 2019-11-11 ENCOUNTER — OFFICE VISIT (OUTPATIENT)
Dept: FAMILY MEDICINE CLINIC | Facility: CLINIC | Age: 59
End: 2019-11-11
Payer: COMMERCIAL

## 2019-11-11 VITALS
DIASTOLIC BLOOD PRESSURE: 72 MMHG | HEIGHT: 64.75 IN | BODY MASS INDEX: 36.76 KG/M2 | OXYGEN SATURATION: 95 % | WEIGHT: 218 LBS | SYSTOLIC BLOOD PRESSURE: 118 MMHG | HEART RATE: 76 BPM | TEMPERATURE: 97 F

## 2019-11-11 DIAGNOSIS — I10 BENIGN ESSENTIAL HYPERTENSION: ICD-10-CM

## 2019-11-11 DIAGNOSIS — C50.911 MALIGNANT NEOPLASM OF RIGHT FEMALE BREAST, UNSPECIFIED ESTROGEN RECEPTOR STATUS, UNSPECIFIED SITE OF BREAST (HCC): Primary | ICD-10-CM

## 2019-11-11 DIAGNOSIS — G47.33 OSA (OBSTRUCTIVE SLEEP APNEA): ICD-10-CM

## 2019-11-11 DIAGNOSIS — Z01.818 PREOPERATIVE CLEARANCE: ICD-10-CM

## 2019-11-11 PROCEDURE — 80053 COMPREHEN METABOLIC PANEL: CPT | Performed by: FAMILY MEDICINE

## 2019-11-11 PROCEDURE — 90471 IMMUNIZATION ADMIN: CPT | Performed by: FAMILY MEDICINE

## 2019-11-11 PROCEDURE — 87086 URINE CULTURE/COLONY COUNT: CPT | Performed by: FAMILY MEDICINE

## 2019-11-11 PROCEDURE — 85025 COMPLETE CBC W/AUTO DIFF WBC: CPT | Performed by: FAMILY MEDICINE

## 2019-11-11 PROCEDURE — 90732 PPSV23 VACC 2 YRS+ SUBQ/IM: CPT | Performed by: FAMILY MEDICINE

## 2019-11-11 PROCEDURE — 93000 ELECTROCARDIOGRAM COMPLETE: CPT | Performed by: FAMILY MEDICINE

## 2019-11-11 PROCEDURE — 99214 OFFICE O/P EST MOD 30 MIN: CPT | Performed by: FAMILY MEDICINE

## 2019-11-11 PROCEDURE — 81001 URINALYSIS AUTO W/SCOPE: CPT | Performed by: FAMILY MEDICINE

## 2019-11-11 RX ORDER — METOPROLOL SUCCINATE 25 MG/1
1 TABLET, EXTENDED RELEASE ORAL DAILY
Refills: 0 | COMMUNITY
Start: 2019-11-10 | End: 2019-12-26

## 2019-11-11 NOTE — PROGRESS NOTES
[facility] [place]  PROGRESS NOTE        HPI:   This is a 61year old female coming in for Patient presents with:  Pre-Op Exam: right breast implant exchange on 11/15/19 at [facility]    HPI  She notes that she is having her breast implant re Range    Ferritin 93.4 18.0 - 340.0 ng/mL   VITAMIN B12   Result Value Ref Range    Vitamin B12 448 193 - 986 pg/mL   TSH W REFLEX TO FREE T4   Result Value Ref Range    TSH 0.973 0.358 - 3.740 mIU/mL   URINALYSIS WITH CULTURE REFLEX   Result Value Ref Ran g/dL    RDW 14.6 11.0 - 15.0 %    RDW-SD 46.3 35.1 - 46.3 fL    Neutrophil Absolute Prelim 4.94 1.50 - 7.70 x10 (3) uL    Neutrophil Absolute 4.94 1.50 - 7.70 x10(3) uL    Lymphocyte Absolute 2.37 1.00 - 4.00 x10(3) uL    Monocyte Absolute 0.46 0.10 - 1.00 mom- a-fib, DM,HTN      dad- Cardiac stents age 71- another stent      p-gma- macular degen. 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 She has been employed for 30 years.   The highest level of education achieved by Faizan Alicia was masters in [address] 2 (two) times daily. • Ferrous Sulfate 324 (65 Fe) MG Oral Tab EC Take by mouth. • aspirin 81 MG Oral Tab Take 81 mg by mouth daily. • docusate sodium 100 MG Oral Cap Take 1 capsule (100 mg total) by mouth 2 (two) times daily.  60 capsule 2 Ht 64.75\"   Wt 218 lb (98.9 kg)   SpO2 95%   BMI 36.56 kg/m²  Estimated body mass index is 36.56 kg/m² as calculated from the following:    Height as of this encounter: 64.75\". Weight as of this encounter: 218 lb (98.9 kg).    Wt Readings from Last 6 E Future    Preoperative clearance  -     ELECTROCARDIOGRAM, COMPLETE  -     CBC WITH DIFFERENTIAL WITH PLATELET; Future  -     COMP METABOLIC PANEL (14);  Future  -     URINALYSIS WITH CULTURE REFLEX; Future    MEI (obstructive sleep apnea)   Recommend using

## 2019-11-12 ENCOUNTER — TELEPHONE (OUTPATIENT)
Dept: FAMILY MEDICINE CLINIC | Facility: CLINIC | Age: 59
End: 2019-11-12

## 2019-11-12 DIAGNOSIS — D69.1 ABNORMAL PLATELETS (HCC): Primary | ICD-10-CM

## 2019-11-12 DIAGNOSIS — D58.2 ABNORMAL HEMOGLOBIN (HCC): ICD-10-CM

## 2019-11-12 NOTE — TELEPHONE ENCOUNTER
Spoke with patient and verified that patient would like lab results sent to below. Pt informed labs still pending but will send when completed.

## 2019-11-13 NOTE — TELEPHONE ENCOUNTER
----- Message from Felice Demarco MD sent at 11/12/2019  9:15 PM CST -----  Urine is contaminated. Patient needs more hydration. Elevated white blood cell count. Agree with antibiotics prior to procedure. HGB is elevated as well as Total protein.

## 2019-11-13 NOTE — TELEPHONE ENCOUNTER
Left message for patient notifying her that OV notes, EKG, and lab results were faxed to the surgeon (Dr. Adalgisa Matute MD).  Pt notified that Dr. Najma Cortez is out of the office until 11/18/19 and did not place order for her to see a hematologist. Patient's surg

## 2019-11-13 NOTE — TELEPHONE ENCOUNTER
Contacted Dr. Cheyenne Wallace office and updated Triston Dunne RN about lab results and recommendations as per Dr. Lavelle Rai. Faxed Dr. Blanca Carrington recommendations to Dr. Naseem Ventura office as well.  Triston Dunne states that she will update Dr. Lynn Pruitt and contact the patient/will call back

## 2019-11-13 NOTE — TELEPHONE ENCOUNTER
Message left for patient notifying her that results are available. Encouraged to call back with any questions or if unable to access in [employer].

## 2019-11-13 NOTE — TELEPHONE ENCOUNTER
Danna Carrillo RN from Dr. Chetan Wilhelm office [phone]  Pt needs to follow recommendation of Dr. Inés Sampson and needs clearance from hematology prior to surgery.

## 2019-11-14 ENCOUNTER — TELEPHONE (OUTPATIENT)
Dept: FAMILY MEDICINE CLINIC | Facility: CLINIC | Age: 59
End: 2019-11-14

## 2019-11-14 NOTE — TELEPHONE ENCOUNTER
Spoke with patient and she was notified that the referral, OV notes and labs sent to Dr. Esther Guerra office. Pt will contact Dr. Aislinn Montesinos to f/u.

## 2019-11-14 NOTE — TELEPHONE ENCOUNTER
----- Message from CHAVA Oliver sent at 11/14/2019  1:41 PM CST -----  Dr. Shimon Scott is out of the office today. Please let patient know the urine culture is negative. Thank you.

## 2019-11-18 ENCOUNTER — TELEPHONE (OUTPATIENT)
Dept: FAMILY MEDICINE CLINIC | Facility: CLINIC | Age: 59
End: 2019-11-18

## 2019-11-27 ENCOUNTER — TELEPHONE (OUTPATIENT)
Dept: FAMILY MEDICINE CLINIC | Facility: CLINIC | Age: 59
End: 2019-11-27

## 2019-11-27 NOTE — TELEPHONE ENCOUNTER
had labs at Dr. Timothy Ortega this morning. wants an order for [facility] sent to his office so they can add that test

## 2019-12-03 ENCOUNTER — PATIENT MESSAGE (OUTPATIENT)
Dept: FAMILY MEDICINE CLINIC | Facility: CLINIC | Age: 59
End: 2019-12-03

## 2019-12-03 NOTE — TELEPHONE ENCOUNTER
From: Lior Chavez  To: Loulou Ortiz MD  Sent: 12/3/2019 8:34 AM CST  Subject: Other    Need to have orders sent to [facility] for [address]- tried scheduling a request through this portal but not sure if it sent

## 2019-12-05 ENCOUNTER — OFFICE VISIT (OUTPATIENT)
Dept: FAMILY MEDICINE CLINIC | Facility: CLINIC | Age: 59
End: 2019-12-05
Payer: COMMERCIAL

## 2019-12-05 VITALS
RESPIRATION RATE: 16 BRPM | WEIGHT: 220.38 LBS | BODY MASS INDEX: 36.72 KG/M2 | HEIGHT: 65 IN | HEART RATE: 79 BPM | TEMPERATURE: 98 F | DIASTOLIC BLOOD PRESSURE: 86 MMHG | OXYGEN SATURATION: 99 % | SYSTOLIC BLOOD PRESSURE: 120 MMHG

## 2019-12-05 DIAGNOSIS — G47.33 OSA (OBSTRUCTIVE SLEEP APNEA): Primary | ICD-10-CM

## 2019-12-05 PROCEDURE — 99214 OFFICE O/P EST MOD 30 MIN: CPT | Performed by: NURSE PRACTITIONER

## 2019-12-05 RX ORDER — LIRAGLUTIDE 6 MG/ML
2.4 INJECTION, SOLUTION SUBCUTANEOUS NIGHTLY
Refills: 4 | COMMUNITY
Start: 2019-11-14 | End: 2021-08-23 | Stop reason: ALTCHOICE

## 2019-12-05 NOTE — TELEPHONE ENCOUNTER
-  Please advise if patient is needing an order  For a Diagnostic Mammogram sent to [facility]?   Edy Rodriguez, 12/04/19, 6:13 PM

## 2019-12-05 NOTE — PATIENT INSTRUCTIONS
Resume metoprolol. If he has side effects of lightheadedness or low blood pressure, contact the office and would consider lowering the dosage. Continue sleep therapy. Follow-up in 6 months - sooner if needed.      Advised if still with sleep apnea and n

## 2019-12-05 NOTE — PROGRESS NOTES
[facility]        HPI:   This is a 61year old female coming in for Patient presents with:  Obstructive Sleep Apnea (MEI): Sleep compliance f/u      HPI:     Present to review sleep therapy.  Had low platelets and orestes breast cancer   • [facility]   • High blood pressure    • High cholesterol    • Neuropathy    • Osteoarthritis    • Personal history of antineoplastic chemotherapy    • PONV (postoperative nausea and vomiting)    • Sleep apnea     CPAP drinks or equivalent per week    Drug use: No    Family History:  Family History   Problem Relation Age of Onset   • Stroke Father    • Diabetes Father    • Diabetes Mother    • Hypertension Mother    • Heart Disorder Mother         Afib   • Other (Other) goiter     Anorectal polyp     MEI (obstructive sleep apnea)     Symptomatic menopausal or female climacteric states     Acquired absence of breast and absent nipple     Abnormal mammogram     Benign essential hypertension     Pure hypercholesterolemia Neck: Normal range of motion. Neck supple. No thyromegaly present. Cardiovascular: Normal rate, regular rhythm, normal heart sounds and intact distal pulses. Pulmonary/Chest: Effort normal and breath sounds normal. No respiratory distress.  Musculoske loss, and maintain and optimal BMI with Exercise 30 minutes most days of the week to target heart rate . Advised patient to change filters,masks,hoses  and tubes and equiptment on a  regular schedule.   Filters and seals shall be changed every 1 month,

## 2019-12-05 NOTE — TELEPHONE ENCOUNTER
Spoke with Radha Espinoza of the breast health center at [facility]. Needs screening mammogram on left. No order needed.

## 2019-12-26 NOTE — TELEPHONE ENCOUNTER
Metoprolol last refill - 6/25/19, 30 cap, 6 refills    LOV - 6/25/19 Cholesterol fu  NOV - 1/13/20 Physical with Dr. Matt Angulo    Future appt:     Your appointments     Date & Time Appointment Department [facility])    Jan 13, 2020  4:00 PM CST Physical - Establish

## 2019-12-27 RX ORDER — METOPROLOL SUCCINATE 25 MG/1
25 TABLET, EXTENDED RELEASE ORAL DAILY
Qty: 90 TABLET | Refills: 1 | Status: SHIPPED | OUTPATIENT
Start: 2019-12-27 | End: 2020-01-13

## 2020-01-06 ENCOUNTER — OFFICE VISIT (OUTPATIENT)
Dept: FAMILY MEDICINE CLINIC | Facility: CLINIC | Age: 60
End: 2020-01-06
Payer: COMMERCIAL

## 2020-01-06 VITALS
TEMPERATURE: 99 F | SYSTOLIC BLOOD PRESSURE: 120 MMHG | DIASTOLIC BLOOD PRESSURE: 76 MMHG | HEART RATE: 81 BPM | WEIGHT: 224 LBS | BODY MASS INDEX: 37.32 KG/M2 | OXYGEN SATURATION: 97 % | HEIGHT: 65 IN

## 2020-01-06 DIAGNOSIS — J00 ACUTE NASOPHARYNGITIS: Primary | ICD-10-CM

## 2020-01-06 PROCEDURE — 99213 OFFICE O/P EST LOW 20 MIN: CPT | Performed by: NURSE PRACTITIONER

## 2020-01-06 RX ORDER — AMOXICILLIN 875 MG/1
875 TABLET, COATED ORAL 2 TIMES DAILY
Qty: 20 TABLET | Refills: 0 | Status: SHIPPED | OUTPATIENT
Start: 2020-01-06 | End: 2020-01-16

## 2020-01-06 NOTE — PROGRESS NOTES
CHIEF COMPLAINT:   Patient presents with:  Nasal Congestion: sinus pressure      HPI:   Tiburcio Samano is a 61year old female who presents to clinic today with complaints of headache, sinus pressure   Hard to wear sleep apnea for 2 nights   Since 1/3/2 • Personal history of antineoplastic chemotherapy    • PONV (postoperative nausea and vomiting)    • Sleep apnea     CPAP      Social History:  Social History    Tobacco Use      Smoking status: Never Smoker      Smokeless tobacco: Never Used    Alcohol Patient voiced understanding and is in agreement with treatment plan. Follow up if symptoms do not improve or if symptoms worsen. Risk and benefits of medication discussed. Stressed importance of completing full course of antibiotic.        Meds &

## 2020-01-06 NOTE — PATIENT INSTRUCTIONS
Rest, increase fluids, salt water gargles ,apple pot (use distilled water) or saline nasal spray,Coricidin HBP, Vicks vapo rub, steam,  Aleve with food twice a day , fill Prescription for amoxicillin  if symptoms persist or increase.      Typical viral ill

## 2020-01-08 RX ORDER — ROSUVASTATIN CALCIUM 5 MG/1
TABLET, COATED ORAL
Qty: 90 TABLET | Refills: 0 | Status: SHIPPED | OUTPATIENT
Start: 2020-01-08 | End: 2020-01-13

## 2020-01-08 NOTE — TELEPHONE ENCOUNTER
Future appt:     Your appointments     Date & Time Appointment Department [facility])    Jan 13, 2020  4:00 PM CST Physical - Established with Eric Morley MD [address] [place] [address] ([facility])            Gokul Mccarty

## 2020-01-13 ENCOUNTER — OFFICE VISIT (OUTPATIENT)
Dept: FAMILY MEDICINE CLINIC | Facility: CLINIC | Age: 60
End: 2020-01-13
Payer: COMMERCIAL

## 2020-01-13 VITALS
HEART RATE: 85 BPM | RESPIRATION RATE: 16 BRPM | HEIGHT: 65 IN | WEIGHT: 219 LBS | TEMPERATURE: 98 F | DIASTOLIC BLOOD PRESSURE: 64 MMHG | SYSTOLIC BLOOD PRESSURE: 108 MMHG | BODY MASS INDEX: 36.49 KG/M2 | OXYGEN SATURATION: 97 %

## 2020-01-13 DIAGNOSIS — Z00.00 WELLNESS EXAMINATION: Primary | ICD-10-CM

## 2020-01-13 DIAGNOSIS — Z01.419 ENCOUNTER FOR WELL WOMAN EXAM: ICD-10-CM

## 2020-01-13 PROBLEM — J20.9 BRONCHITIS WITH BRONCHOSPASM: Status: RESOLVED | Noted: 2018-01-29 | Resolved: 2020-01-13

## 2020-01-13 PROCEDURE — 88175 CYTOPATH C/V AUTO FLUID REDO: CPT | Performed by: FAMILY MEDICINE

## 2020-01-13 PROCEDURE — 87624 HPV HI-RISK TYP POOLED RSLT: CPT | Performed by: FAMILY MEDICINE

## 2020-01-13 PROCEDURE — 87070 CULTURE OTHR SPECIMN AEROBIC: CPT | Performed by: FAMILY MEDICINE

## 2020-01-13 PROCEDURE — 99396 PREV VISIT EST AGE 40-64: CPT | Performed by: FAMILY MEDICINE

## 2020-01-13 PROCEDURE — 87077 CULTURE AEROBIC IDENTIFY: CPT | Performed by: FAMILY MEDICINE

## 2020-01-13 PROCEDURE — 87205 SMEAR GRAM STAIN: CPT | Performed by: FAMILY MEDICINE

## 2020-01-13 RX ORDER — METOPROLOL SUCCINATE 25 MG/1
25 TABLET, EXTENDED RELEASE ORAL DAILY
Qty: 90 TABLET | Refills: 1 | Status: SHIPPED | OUTPATIENT
Start: 2020-01-13 | End: 2020-08-18

## 2020-01-13 RX ORDER — ROSUVASTATIN CALCIUM 5 MG/1
TABLET, COATED ORAL
Qty: 90 TABLET | Refills: 1 | Status: SHIPPED | OUTPATIENT
Start: 2020-01-13 | End: 2020-08-18

## 2020-01-13 NOTE — PROGRESS NOTES
HPI:   Jo Mancini is a 61year old female who presents for an Annual Health Visit. Patient had a terrible cold, sinusitis and laryngitis, and now after 4 days is improving  Saw the NP last week.      She notes that she is having some question (postoperative nausea and vomiting)    • Sleep apnea     CPAP      Past Surgical History:   Procedure Laterality Date   • ARTHROSCOPY OF JOINT UNLISTED Right     knee   •      • COLONOSCOPY     • HIP TOTAL ARTHROPLASTY REVISION Right 2019    P .       She lives at home in [place]. She is  with 2 children. Has close friend. One sexual partner. Pooja's Gn[employer]ist is [employer]. DPOA:  Pt states that her sonJuan Pride: [phone] if unable son Antonio.        L Pulmonary/Chest: Effort normal and breath sounds normal.   Right breast with thickness and tightness about scars. No masses,   Implant in place and normal.   Left breast no masses, no lN palpable.e    Abdominal: Soft.  Bowel sounds are normal. She exhib ORIF (open reduction internal fixation) fracture     Status post left hip replacement     Osteoarthritis of hip     Thyroid nodule     Tubular adenoma of colon     Failed total hip arthroplasty (HCC)     S/P total hip arthroplasty      Ankur Lee MD  1/

## 2020-01-14 LAB — HPV I/H RISK 1 DNA SPEC QL NAA+PROBE: NEGATIVE

## 2020-01-17 ENCOUNTER — TELEPHONE (OUTPATIENT)
Dept: FAMILY MEDICINE CLINIC | Facility: CLINIC | Age: 60
End: 2020-01-17

## 2020-01-17 NOTE — TELEPHONE ENCOUNTER
Spoke with patient and reviewed lab results. Patient confirmed that she is seeing Ada for FU on 1/20/20. Patient verbalized understanding and has no further questions or concerns.

## 2020-01-17 NOTE — TELEPHONE ENCOUNTER
----- Message from Felice Demarco MD sent at 1/14/2020  2:43 PM CST -----  Normal (HPV) results,   please notify patient.    [place] message sent

## 2020-01-20 ENCOUNTER — OFFICE VISIT (OUTPATIENT)
Dept: FAMILY MEDICINE CLINIC | Facility: CLINIC | Age: 60
End: 2020-01-20
Payer: COMMERCIAL

## 2020-01-20 VITALS
HEIGHT: 64.5 IN | DIASTOLIC BLOOD PRESSURE: 62 MMHG | TEMPERATURE: 99 F | SYSTOLIC BLOOD PRESSURE: 108 MMHG | BODY MASS INDEX: 37.1 KG/M2 | HEART RATE: 72 BPM | OXYGEN SATURATION: 96 % | WEIGHT: 220 LBS | RESPIRATION RATE: 18 BRPM

## 2020-01-20 DIAGNOSIS — L72.3 SEBACEOUS CYST: ICD-10-CM

## 2020-01-20 DIAGNOSIS — J06.9 VIRAL UPPER RESPIRATORY TRACT INFECTION: Primary | ICD-10-CM

## 2020-01-20 PROCEDURE — 99213 OFFICE O/P EST LOW 20 MIN: CPT | Performed by: NURSE PRACTITIONER

## 2020-01-21 NOTE — PATIENT INSTRUCTIONS
Continue to treat congestion as needed. Monitor the cyst area - return to clinic if worsens. Otherwise follow-up as needed.

## 2020-01-21 NOTE — PROGRESS NOTES
HPI:    Patient ID: Rob Barajas is a 61year old female. HPI     Patient is present with continued complaints of congestion. states that it is slowing getting better.  She also is here to follow-up on a cyst to the left upper thigh area that [facility] r Physical Exam   Constitutional: She is oriented to person, place, and time. She appears well-developed and well-nourished. No distress. HENT:   Head: Normocephalic and atraumatic.    Right Ear: Hearing, tympanic membrane, external ear and ear canal normal

## 2020-02-07 NOTE — TELEPHONE ENCOUNTER
Ezetimibe LR - 7/8/19, 90 tab, 1 refill    LOV - 1/13/20 Physical  NOV - Not scheduled    Future appt:    Last Appointment with provider:   1/13/2020  Last appointment at [facility]:  1/20/2020  Cholesterol, Total (mg/dL)   Date Value   08/01/2019 174

## 2020-02-10 RX ORDER — EZETIMIBE 10 MG/1
10 TABLET ORAL NIGHTLY
Qty: 90 TABLET | Refills: 1 | Status: SHIPPED | OUTPATIENT
Start: 2020-02-10 | End: 2020-08-03

## 2020-03-24 LAB
ALBUMIN/GLOBULIN RATIO: 1.1
ALBUMIN: 3.7 G/DL
ALKALINE PHOSPHATASE: 100
ALT (SGPT): 31 IU/L
ANION GAP: 11
AST (SGOT): 31 IU/L
BILIRUBIN, TOTAL: 0.5 MG/DL
BUN/CREATININE RATIO: 19
BUN: 20
CALCIUM: 8.9
CARBON DIOXIDE: 22
CHLORIDE: 111
CREATININE: 1.05 MG/DL
GLOBULIN: 3.5
GLUCOSE, SERUM: 105 MG/DL
GLUCOSE: 87
HEMOGLOBIN A1C: 5.3
POTASSIUM: 4
SODIUM: 144
TOTAL PROTEIN: 7.2

## 2020-08-03 RX ORDER — EZETIMIBE 10 MG/1
10 TABLET ORAL NIGHTLY
Qty: 90 TABLET | Refills: 0 | Status: SHIPPED | OUTPATIENT
Start: 2020-08-03 | End: 2020-08-18

## 2020-08-03 NOTE — TELEPHONE ENCOUNTER
Dr. Jeniffer Corrigan is out of the office today. Patient is overdue for her 6 month follow-up. Please have her schedule and then will send in refill. Thank you.

## 2020-08-03 NOTE — TELEPHONE ENCOUNTER
I scheduled her 6 month FU with Dr. Reji Mcghee on 8/18/20. Patient here with  & verbalized [facility]. Patient does not have results with him but stated he did \"get a call saying it was negative but unsure if it was antigen or antibody\". Writer had patient provide written consent for record release to see exactly what testing was done & the results.     Patient's  agreeable to come back to [facility] to have test done if results received are not for the specific test needed.

## 2020-08-03 NOTE — TELEPHONE ENCOUNTER
Last Refill:    ezetimibe 10 MG Oral Tab 90 tablet 1 2/10/2020     LOV - 1/13/20 Physical  NOV - 8/18/20 Six Month FU    [id number] - [place], [place] - [address] [facility]    Future appt:    Last Appointment w

## 2020-08-18 ENCOUNTER — OFFICE VISIT (OUTPATIENT)
Dept: FAMILY MEDICINE CLINIC | Facility: CLINIC | Age: 60
End: 2020-08-18
Payer: COMMERCIAL

## 2020-08-18 ENCOUNTER — APPOINTMENT (OUTPATIENT)
Dept: LAB | Age: 60
End: 2020-08-18
Attending: FAMILY MEDICINE
Payer: COMMERCIAL

## 2020-08-18 VITALS
OXYGEN SATURATION: 96 % | HEART RATE: 74 BPM | SYSTOLIC BLOOD PRESSURE: 108 MMHG | WEIGHT: 211.81 LBS | BODY MASS INDEX: 35.72 KG/M2 | DIASTOLIC BLOOD PRESSURE: 60 MMHG | TEMPERATURE: 98 F | RESPIRATION RATE: 18 BRPM | HEIGHT: 64.5 IN

## 2020-08-18 DIAGNOSIS — E78.00 PURE HYPERCHOLESTEROLEMIA: ICD-10-CM

## 2020-08-18 DIAGNOSIS — Z12.31 VISIT FOR SCREENING MAMMOGRAM: ICD-10-CM

## 2020-08-18 DIAGNOSIS — R09.89 RIGHT CAROTID BRUIT: ICD-10-CM

## 2020-08-18 DIAGNOSIS — I10 BENIGN ESSENTIAL HYPERTENSION: ICD-10-CM

## 2020-08-18 DIAGNOSIS — E78.00 PURE HYPERCHOLESTEROLEMIA: Primary | ICD-10-CM

## 2020-08-18 DIAGNOSIS — E53.8 VITAMIN B12 DEFICIENCY: ICD-10-CM

## 2020-08-18 DIAGNOSIS — E04.1 THYROID NODULE: ICD-10-CM

## 2020-08-18 LAB
ALBUMIN SERPL-MCNC: 4.1 G/DL (ref 3.4–5)
ALBUMIN/GLOB SERPL: 1 {RATIO} (ref 1–2)
ALP LIVER SERPL-CCNC: 110 U/L (ref 46–118)
ALT SERPL-CCNC: 26 U/L (ref 13–56)
ANION GAP SERPL CALC-SCNC: 2 MMOL/L (ref 0–18)
AST SERPL-CCNC: 19 U/L (ref 15–37)
BASOPHILS # BLD AUTO: 0.04 X10(3) UL (ref 0–0.2)
BASOPHILS NFR BLD AUTO: 0.4 %
BILIRUB SERPL-MCNC: 0.6 MG/DL (ref 0.1–2)
BILIRUB UR QL STRIP.AUTO: NEGATIVE
BUN BLD-MCNC: 18 MG/DL (ref 7–18)
BUN/CREAT SERPL: 20.7 (ref 10–20)
CALCIUM BLD-MCNC: 9.7 MG/DL (ref 8.5–10.1)
CHLORIDE SERPL-SCNC: 106 MMOL/L (ref 98–112)
CHOLEST SMN-MCNC: 205 MG/DL (ref ?–200)
CK SERPL-CCNC: 102 U/L (ref 26–192)
CLARITY UR REFRACT.AUTO: CLEAR
CO2 SERPL-SCNC: 30 MMOL/L (ref 21–32)
COLOR UR AUTO: YELLOW
CREAT BLD-MCNC: 0.87 MG/DL (ref 0.55–1.02)
DEPRECATED HBV CORE AB SER IA-ACNC: 176.8 NG/ML (ref 18–340)
DEPRECATED RDW RBC AUTO: 45.5 FL (ref 35.1–46.3)
EOSINOPHIL # BLD AUTO: 0.19 X10(3) UL (ref 0–0.7)
EOSINOPHIL NFR BLD AUTO: 2 %
ERYTHROCYTE [DISTWIDTH] IN BLOOD BY AUTOMATED COUNT: 13.3 % (ref 11–15)
GLOBULIN PLAS-MCNC: 4.2 G/DL (ref 2.8–4.4)
GLUCOSE BLD-MCNC: 75 MG/DL (ref 70–99)
GLUCOSE UR STRIP.AUTO-MCNC: NEGATIVE MG/DL
HCT VFR BLD AUTO: 47.9 % (ref 35–48)
HDLC SERPL-MCNC: 55 MG/DL (ref 40–59)
HGB BLD-MCNC: 14.5 G/DL (ref 12–16)
IMM GRANULOCYTES # BLD AUTO: 0.04 X10(3) UL (ref 0–1)
IMM GRANULOCYTES NFR BLD: 0.4 %
IRON SATURATION: 17 % (ref 15–50)
IRON SERPL-MCNC: 76 UG/DL (ref 50–170)
KETONES UR STRIP.AUTO-MCNC: NEGATIVE MG/DL
LDLC SERPL CALC-MCNC: 102 MG/DL (ref ?–100)
LEUKOCYTE ESTERASE UR QL STRIP.AUTO: NEGATIVE
LYMPHOCYTES # BLD AUTO: 2.84 X10(3) UL (ref 1–4)
LYMPHOCYTES NFR BLD AUTO: 29.6 %
M PROTEIN MFR SERPL ELPH: 8.3 G/DL (ref 6.4–8.2)
MCH RBC QN AUTO: 27.8 PG (ref 26–34)
MCHC RBC AUTO-ENTMCNC: 30.3 G/DL (ref 31–37)
MCV RBC AUTO: 91.9 FL (ref 80–100)
MONOCYTES # BLD AUTO: 0.53 X10(3) UL (ref 0.1–1)
MONOCYTES NFR BLD AUTO: 5.5 %
NEUTROPHILS # BLD AUTO: 5.97 X10 (3) UL (ref 1.5–7.7)
NEUTROPHILS # BLD AUTO: 5.97 X10(3) UL (ref 1.5–7.7)
NEUTROPHILS NFR BLD AUTO: 62.1 %
NITRITE UR QL STRIP.AUTO: NEGATIVE
NONHDLC SERPL-MCNC: 150 MG/DL (ref ?–130)
OSMOLALITY SERPL CALC.SUM OF ELEC: 287 MOSM/KG (ref 275–295)
PATIENT FASTING Y/N/NP: NO
PATIENT FASTING Y/N/NP: NO
PH UR STRIP.AUTO: 5 [PH] (ref 4.5–8)
PLATELET # BLD AUTO: 329 10(3)UL (ref 150–450)
POTASSIUM SERPL-SCNC: 4 MMOL/L (ref 3.5–5.1)
PROT UR STRIP.AUTO-MCNC: NEGATIVE MG/DL
RBC # BLD AUTO: 5.21 X10(6)UL (ref 3.8–5.3)
RBC UR QL AUTO: NEGATIVE
SODIUM SERPL-SCNC: 138 MMOL/L (ref 136–145)
SP GR UR STRIP.AUTO: 1.02 (ref 1–1.03)
TOTAL IRON BINDING CAPACITY: 438 UG/DL (ref 240–450)
TRANSFERRIN SERPL-MCNC: 294 MG/DL (ref 200–360)
TRIGL SERPL-MCNC: 238 MG/DL (ref 30–149)
TSI SER-ACNC: 0.65 MIU/ML (ref 0.36–3.74)
URATE SERPL-MCNC: 4.7 MG/DL (ref 2.6–6)
UROBILINOGEN UR STRIP.AUTO-MCNC: <2 MG/DL
VIT B12 SERPL-MCNC: 1694 PG/ML (ref 193–986)
VIT D+METAB SERPL-MCNC: 41.9 NG/ML (ref 30–100)
VLDLC SERPL CALC-MCNC: 48 MG/DL (ref 0–30)
WBC # BLD AUTO: 9.6 X10(3) UL (ref 4–11)

## 2020-08-18 PROCEDURE — 80050 GENERAL HEALTH PANEL: CPT | Performed by: FAMILY MEDICINE

## 2020-08-18 PROCEDURE — 3078F DIAST BP <80 MM HG: CPT | Performed by: FAMILY MEDICINE

## 2020-08-18 PROCEDURE — 36415 COLL VENOUS BLD VENIPUNCTURE: CPT | Performed by: FAMILY MEDICINE

## 2020-08-18 PROCEDURE — 3008F BODY MASS INDEX DOCD: CPT | Performed by: FAMILY MEDICINE

## 2020-08-18 PROCEDURE — 83550 IRON BINDING TEST: CPT | Performed by: FAMILY MEDICINE

## 2020-08-18 PROCEDURE — 82728 ASSAY OF FERRITIN: CPT | Performed by: FAMILY MEDICINE

## 2020-08-18 PROCEDURE — 82550 ASSAY OF CK (CPK): CPT | Performed by: FAMILY MEDICINE

## 2020-08-18 PROCEDURE — 3074F SYST BP LT 130 MM HG: CPT | Performed by: FAMILY MEDICINE

## 2020-08-18 PROCEDURE — 82306 VITAMIN D 25 HYDROXY: CPT | Performed by: FAMILY MEDICINE

## 2020-08-18 PROCEDURE — 82607 VITAMIN B-12: CPT | Performed by: FAMILY MEDICINE

## 2020-08-18 PROCEDURE — 80061 LIPID PANEL: CPT | Performed by: FAMILY MEDICINE

## 2020-08-18 PROCEDURE — 81001 URINALYSIS AUTO W/SCOPE: CPT | Performed by: FAMILY MEDICINE

## 2020-08-18 PROCEDURE — 99214 OFFICE O/P EST MOD 30 MIN: CPT | Performed by: FAMILY MEDICINE

## 2020-08-18 PROCEDURE — 84550 ASSAY OF BLOOD/URIC ACID: CPT | Performed by: FAMILY MEDICINE

## 2020-08-18 PROCEDURE — 83540 ASSAY OF IRON: CPT | Performed by: FAMILY MEDICINE

## 2020-08-18 RX ORDER — ROSUVASTATIN CALCIUM 5 MG/1
TABLET, COATED ORAL
Qty: 90 TABLET | Refills: 1 | Status: SHIPPED | OUTPATIENT
Start: 2020-08-18 | End: 2020-10-15

## 2020-08-18 RX ORDER — EZETIMIBE 10 MG/1
10 TABLET ORAL NIGHTLY
Qty: 90 TABLET | Refills: 1 | Status: SHIPPED | OUTPATIENT
Start: 2020-08-18 | End: 2020-10-15

## 2020-08-18 NOTE — PROGRESS NOTES
[facility] [place]  PROGRESS NOTE        HPI:   This is a 61year old female coming in for Patient presents with: Follow - Up: 6 month follow up     HPI she is having hip pain. She is overall concerned about her insurance.   Seeing the special MAIN OR   • MASTECTOMY RIGHT Right    • OTHER SURGICAL HISTORY Left 2017    Hammer Toe fixed    • REDUCTION LEFT     • TONSILLECTOMY     • TOTAL HIP REPLACEMENT Right     @[facility]   • TOTAL HIP REPLACEMENT Right 05/16/2018    revision   • TOTAL HI Father    • Diabetes Mother    • Hypertension Mother    • Heart Disorder Mother         Afib   • Other (Other) Sister         fibromyalgia     Allergies:    Docetaxel               RASH    Comment:swelling  Current Meds:  Current Outpatient Medications   M replacement     Osteoarthritis of hip     Thyroid nodule     Tubular adenoma of colon     Failed total hip arthroplasty (HCC)     S/P total hip arthroplasty      REVIEW OF SYSTEMS:   Review of Systems   Constitutional: Negative. HENT: Negative.     Eyes: Bowel sounds are normal. She exhibits no distension. Musculoskeletal:         General: Tenderness present. Comments: Decreased range of motion left hip      Neurological: She is alert and oriented to person, place, and time.    Skin: Skin is warm and notes found for this encounter. future appointment:    No follow-ups on file.     Meds & Refills for this Visit:  Requested Prescriptions     Signed Prescriptions Disp Refills   • Rosuvastatin Calcium 5 MG Oral Tab 90 tablet 1     Sig: TAKE 1 TABLET(5

## 2020-08-19 ENCOUNTER — TELEPHONE (OUTPATIENT)
Dept: FAMILY MEDICINE CLINIC | Facility: CLINIC | Age: 60
End: 2020-08-19

## 2020-08-19 DIAGNOSIS — M25.559 HIP PAIN: Primary | ICD-10-CM

## 2020-08-19 NOTE — TELEPHONE ENCOUNTER
Please see previous note. Pt requesting a referral to  DR. Héctor Ko  [email or web address]  FAX# [phone].    Please advise,  NHUNG Muse

## 2020-08-19 NOTE — TELEPHONE ENCOUNTER
needs revision surgery to L hip   and needs referral to Dr Aleta Lantigua @ [facility]  fax$    [phone]        Dr Elise Martinez needs to know this info TODAY -  ins changes 9/1

## 2020-08-20 ENCOUNTER — TELEPHONE (OUTPATIENT)
Dept: FAMILY MEDICINE CLINIC | Facility: CLINIC | Age: 60
End: 2020-08-20

## 2020-08-20 RX ORDER — ROSUVASTATIN CALCIUM 10 MG/1
10 TABLET, COATED ORAL NIGHTLY
Qty: 90 TABLET | Refills: 1 | Status: SHIPPED | OUTPATIENT
Start: 2020-08-20 | End: 2020-11-30

## 2020-08-20 NOTE — TELEPHONE ENCOUNTER
----- Message from Ara Deleon MD sent at 8/20/2020  8:20 AM CDT -----  Cholesterols are not all to goal.  Increase crestor to 10 mg daily. Follow up lipids and cmp and ck in 3 months.      In order to lower risk of cardiovascular disease the following

## 2020-08-20 NOTE — TELEPHONE ENCOUNTER
Pt notified and verbalized understanding that fax was sent to Dr. Salvador Barker at fax # [phone] per pt request.    Pt also states that she needs a script to [facility]anna H[facility] in [place] for the increased dose of Crestor per lab results. Script pending.

## 2020-08-20 NOTE — TELEPHONE ENCOUNTER
Pt had called for another reason yesterday and everything is charted there. Please see phone note dated 8/19/20.

## 2020-08-20 NOTE — TELEPHONE ENCOUNTER
----- Message from Jane Vela MD sent at 8/20/2020  8:17 AM CDT -----  Normal (urinalysis)  results,   please notify patient.    Dana [employer] message sent

## 2020-08-25 ENCOUNTER — TELEPHONE (OUTPATIENT)
Dept: FAMILY MEDICINE CLINIC | Facility: CLINIC | Age: 60
End: 2020-08-25

## 2020-08-25 NOTE — TELEPHONE ENCOUNTER
Pt had a Carotid Ultrasound done on 8/24/20 at [facility].    Per Dr. Kristin Langley review:    Normal Ultrasound

## 2020-09-01 ENCOUNTER — TELEPHONE (OUTPATIENT)
Dept: FAMILY MEDICINE CLINIC | Facility: CLINIC | Age: 60
End: 2020-09-01

## 2020-09-01 NOTE — TELEPHONE ENCOUNTER
Pt has new insurance today from [address] [place] [address] to [facility].  She needs a new referral for Dr. Roni Mendez for orthopedic surgery and prior authorization and Dr Jordy Siu with [facility] in [facility]

## 2020-09-01 NOTE — TELEPHONE ENCOUNTER
hip replacement surgery, need to submit medical authorization  to medical group #[id number], after we get it needs to be sent to   No future appointments.

## 2020-09-01 NOTE — TELEPHONE ENCOUNTER
also she needs a referral to see Dr. Al John, located out of [address] [place] [address] in [facility], questions give her a call. No future appointments.

## 2020-09-01 NOTE — TELEPHONE ENCOUNTER
Please see previous notes. It was explained that the PCP's cannot refer outside of Pts insurance network. Pt will have the doctors contact her insurance company and request out of network approvals.   Pt was condescending in her request for referrals a

## 2020-09-01 NOTE — TELEPHONE ENCOUNTER
With [facility] insurance we cannot refer out of network. We can only refer in network. I know that she has been doing a lot with these doctors and I think we maybe have to get a second opinion?

## 2020-09-21 ENCOUNTER — TELEPHONE (OUTPATIENT)
Dept: FAMILY MEDICINE CLINIC | Facility: CLINIC | Age: 60
End: 2020-09-21

## 2020-09-21 DIAGNOSIS — E66.9 CLASS 2 OBESITY WITHOUT SERIOUS COMORBIDITY IN ADULT, UNSPECIFIED BMI, UNSPECIFIED OBESITY TYPE: Primary | ICD-10-CM

## 2020-09-21 NOTE — TELEPHONE ENCOUNTER
Is that patient covered in the [facility]? If not then need to refer to [facility] provider.

## 2020-09-21 NOTE — TELEPHONE ENCOUNTER
Patient will [facility].     Please advise Referral to above MD.  Ann-Marie Rosales, 09/21/20, 3:35 PM

## 2020-09-21 NOTE — TELEPHONE ENCOUNTER
pt has an HMO plan now and her weight loss  needs a referral with authorization number and info for approved  visits- needs this faxed to [phone]

## 2020-09-24 ENCOUNTER — TELEPHONE (OUTPATIENT)
Dept: FAMILY MEDICINE CLINIC | Facility: CLINIC | Age: 60
End: 2020-09-24

## 2020-09-24 NOTE — TELEPHONE ENCOUNTER
regarding a referral for weight management  Future Appointments   Date Time Provider Katie Poe   10/15/2020  9:30 AM Sourav Overton MD [facility]

## 2020-09-28 NOTE — TELEPHONE ENCOUNTER
Jing Hobbs at Dr. Everett Christopher office states patient has an appointment at their office on 10/8/20. States they received the referral from our office but they need the insurance referral authorization information prior to patient's appointment.      Jing Hobbs requ

## 2020-10-07 NOTE — TELEPHONE ENCOUNTER
Joselo Araiza with Dr. Antony Reed office referral was cancelled because the provider is out of network

## 2020-10-15 ENCOUNTER — OFFICE VISIT (OUTPATIENT)
Dept: FAMILY MEDICINE CLINIC | Facility: CLINIC | Age: 60
End: 2020-10-15

## 2020-10-15 ENCOUNTER — LAB ENCOUNTER (OUTPATIENT)
Dept: LAB | Age: 60
End: 2020-10-15
Attending: FAMILY MEDICINE
Payer: COMMERCIAL

## 2020-10-15 ENCOUNTER — HOSPITAL ENCOUNTER (OUTPATIENT)
Dept: GENERAL RADIOLOGY | Age: 60
Discharge: HOME | End: 2020-10-15
Attending: FAMILY MEDICINE
Payer: COMMERCIAL

## 2020-10-15 VITALS
TEMPERATURE: 97 F | RESPIRATION RATE: 16 BRPM | DIASTOLIC BLOOD PRESSURE: 70 MMHG | BODY MASS INDEX: 36.32 KG/M2 | OXYGEN SATURATION: 96 % | HEIGHT: 64.5 IN | HEART RATE: 56 BPM | SYSTOLIC BLOOD PRESSURE: 114 MMHG | WEIGHT: 215.38 LBS

## 2020-10-15 DIAGNOSIS — M19.91 PRIMARY OSTEOARTHRITIS, UNSPECIFIED SITE: ICD-10-CM

## 2020-10-15 DIAGNOSIS — Z01.818 PREOPERATIVE CLEARANCE: ICD-10-CM

## 2020-10-15 DIAGNOSIS — Z01.818 PREOPERATIVE CLEARANCE: Primary | ICD-10-CM

## 2020-10-15 PROCEDURE — 3078F DIAST BP <80 MM HG: CPT | Performed by: FAMILY MEDICINE

## 2020-10-15 PROCEDURE — 85025 COMPLETE CBC W/AUTO DIFF WBC: CPT

## 2020-10-15 PROCEDURE — 3074F SYST BP LT 130 MM HG: CPT | Performed by: FAMILY MEDICINE

## 2020-10-15 PROCEDURE — 93000 ELECTROCARDIOGRAM COMPLETE: CPT | Performed by: FAMILY MEDICINE

## 2020-10-15 PROCEDURE — 36415 COLL VENOUS BLD VENIPUNCTURE: CPT

## 2020-10-15 PROCEDURE — 87086 URINE CULTURE/COLONY COUNT: CPT | Performed by: FAMILY MEDICINE

## 2020-10-15 PROCEDURE — 71046 X-RAY EXAM CHEST 2 VIEWS: CPT | Performed by: FAMILY MEDICINE

## 2020-10-15 PROCEDURE — 82550 ASSAY OF CK (CPK): CPT

## 2020-10-15 PROCEDURE — 3008F BODY MASS INDEX DOCD: CPT | Performed by: FAMILY MEDICINE

## 2020-10-15 PROCEDURE — 99215 OFFICE O/P EST HI 40 MIN: CPT | Performed by: FAMILY MEDICINE

## 2020-10-15 PROCEDURE — 80053 COMPREHEN METABOLIC PANEL: CPT

## 2020-10-15 PROCEDURE — 81001 URINALYSIS AUTO W/SCOPE: CPT | Performed by: FAMILY MEDICINE

## 2020-10-15 NOTE — PROGRESS NOTES
[facility] [place]  PROGRESS NOTE        HPI:   This is a 61year old female coming in for Patient presents with:  Pre-Op Exam: Left Hip surgery on 11/02/2020 with Rolin Pain    HPI She is having a revision of her left hip, as she notes the Social History:  Social History    Social History Narrative      Father is alive. Mother is alive.        The patient indicates family history of colon cancer (grandmother), heart disease (father, mother, grandfather), stroke (grandfather), coronary art 90 tablet 1   • SAXENDA 18 MG/3ML Subcutaneous Solution Pen-injector Inject 2.4 mL as directed daily. 4   • Multiple Vitamins-Minerals (ICAPS LUTEIN & ZEAXANTHIN OR) Take 1 capsule by mouth every other day.      • Calcium Carbonate-Vitamin D (CALCIUM-VIT Psychiatric/Behavioral: Negative. All other systems reviewed and are negative. EKG done and reviewed by me with patient.     Unchanged from previously     EXAM:   /70   Pulse 56   Temp 97.1 °F (36.2 °C) (Temporal)   Resp 16   Ht 64.5\"   Wt 21 visit:    Preoperative clearance  -     CBC WITH DIFFERENTIAL WITH PLATELET; Future  -     CK CREATINE KINASE (NOT CREATININE); Future  -     COMP METABOLIC PANEL (14);  Future  -     URINALYSIS WITH CULTURE REFLEX; Future  -     XR CHEST PA + LAT CHEST (CP Immunizations  Administered            Date(s) Administered    Pneumovax 23          11/11/2019      TDAP                  11/09/2015      \" This note was created utilizing Dragon speech recognition software. Please excuse any grammatical errors.  Call my

## 2020-10-16 ENCOUNTER — TELEPHONE (OUTPATIENT)
Dept: FAMILY MEDICINE CLINIC | Facility: CLINIC | Age: 60
End: 2020-10-16

## 2020-10-16 NOTE — TELEPHONE ENCOUNTER
----- Message from Eric Loaiza MD sent at 10/16/2020  2:27 PM CDT -----  Normal culture send:   Labs, ekg and note to surgeon and preop.

## 2020-10-16 NOTE — TELEPHONE ENCOUNTER
----- Message from Arabella Vee MD sent at 10/15/2020  5:30 PM CDT -----  Await urine culture:  Make sure being done. Blood in urine,   May need to look for kidney stones with ct abdomen if culture is negative.

## 2020-10-16 NOTE — TELEPHONE ENCOUNTER
----- Message from Aleks Camarillo MD sent at 10/15/2020  5:32 PM CDT -----  Normal send with notes, ,ekg, cxr, to preop and surgeon.

## 2020-10-16 NOTE — TELEPHONE ENCOUNTER
----- Message from Tatyana Velarde MD sent at 10/15/2020  5:31 PM CDT -----  Chest xray is normal send with note, labs and ekg to surgeon and preop.

## 2020-10-21 NOTE — DISCHARGE SUMMARY
Ortho Discharge Summary     Patient ID:  Crieslda Whitman  [id number]  23 year old  3/19/1960      Admit Date: 11/2/2020 11:05 AM    Discharge Date and Time: 11/4/2020  3:27 PM    Attending Physician: Cirilo Garland MD     Reason for admission: left hip mec

## 2020-10-28 RX ORDER — EZETIMIBE 10 MG/1
10 TABLET ORAL EVERY MORNING
COMMUNITY
End: 2021-04-29

## 2020-10-30 ENCOUNTER — LAB ENCOUNTER (OUTPATIENT)
Dept: LAB | Age: 60
End: 2020-10-30
Attending: ORTHOPAEDIC SURGERY
Payer: COMMERCIAL

## 2020-10-30 DIAGNOSIS — Z01.818 PRE-OP TESTING: ICD-10-CM

## 2020-10-30 PROCEDURE — 86850 RBC ANTIBODY SCREEN: CPT

## 2020-10-30 PROCEDURE — 86901 BLOOD TYPING SEROLOGIC RH(D): CPT

## 2020-10-30 PROCEDURE — 86900 BLOOD TYPING SEROLOGIC ABO: CPT

## 2020-10-30 PROCEDURE — 87641 MR-STAPH DNA AMP PROBE: CPT

## 2020-11-02 ENCOUNTER — HOSPITAL ENCOUNTER (INPATIENT)
Facility: HOSPITAL | Age: 60
LOS: 2 days | Discharge: HOME HEALTH | DRG: 468 | End: 2020-11-04
Attending: ORTHOPAEDIC SURGERY | Admitting: ORTHOPAEDIC SURGERY
Payer: COMMERCIAL

## 2020-11-02 ENCOUNTER — ANESTHESIA EVENT (OUTPATIENT)
Dept: SURGERY | Facility: HOSPITAL | Age: 60
DRG: 468 | End: 2020-11-02
Payer: COMMERCIAL

## 2020-11-02 ENCOUNTER — APPOINTMENT (OUTPATIENT)
Dept: GENERAL RADIOLOGY | Facility: HOSPITAL | Age: 60
DRG: 468 | End: 2020-11-02
Attending: PHYSICIAN ASSISTANT
Payer: COMMERCIAL

## 2020-11-02 ENCOUNTER — ANESTHESIA (OUTPATIENT)
Dept: SURGERY | Facility: HOSPITAL | Age: 60
DRG: 468 | End: 2020-11-02
Payer: COMMERCIAL

## 2020-11-02 DIAGNOSIS — Z01.818 PRE-OP TESTING: Primary | ICD-10-CM

## 2020-11-02 PROCEDURE — 0SUE09Z SUPPLEMENT LEFT HIP JOINT, ACETABULAR SURFACE WITH LINER, OPEN APPROACH: ICD-10-PCS | Performed by: ORTHOPAEDIC SURGERY

## 2020-11-02 PROCEDURE — 0SRE0JZ REPLACEMENT OF LEFT HIP JOINT, ACETABULAR SURFACE WITH SYNTHETIC SUBSTITUTE, OPEN APPROACH: ICD-10-PCS | Performed by: ORTHOPAEDIC SURGERY

## 2020-11-02 PROCEDURE — 0SPB09Z REMOVAL OF LINER FROM LEFT HIP JOINT, OPEN APPROACH: ICD-10-PCS | Performed by: ORTHOPAEDIC SURGERY

## 2020-11-02 PROCEDURE — 0SPE0JZ REMOVAL OF SYNTHETIC SUBSTITUTE FROM LEFT HIP JOINT, ACETABULAR SURFACE, OPEN APPROACH: ICD-10-PCS | Performed by: ORTHOPAEDIC SURGERY

## 2020-11-02 PROCEDURE — 73502 X-RAY EXAM HIP UNI 2-3 VIEWS: CPT | Performed by: PHYSICIAN ASSISTANT

## 2020-11-02 PROCEDURE — 99232 SBSQ HOSP IP/OBS MODERATE 35: CPT | Performed by: HOSPITALIST

## 2020-11-02 DEVICE — IMPLANTABLE DEVICE: Type: IMPLANTABLE DEVICE | Site: HIP | Status: FUNCTIONAL

## 2020-11-02 DEVICE — BONE SCREW 6.5X15 SELF-TAP: Type: IMPLANTABLE DEVICE | Site: HIP | Status: FUNCTIONAL

## 2020-11-02 DEVICE — BONE SCREW 6.5X20 SELF-TAP: Type: IMPLANTABLE DEVICE | Site: HIP | Status: FUNCTIONAL

## 2020-11-02 DEVICE — BONE SCREW 6.5X30 SELF-TAP: Type: IMPLANTABLE DEVICE | Site: HIP | Status: FUNCTIONAL

## 2020-11-02 RX ORDER — TRAMADOL HYDROCHLORIDE 50 MG/1
50 TABLET ORAL EVERY 6 HOURS PRN
Status: DISCONTINUED | OUTPATIENT
Start: 2020-11-02 | End: 2020-11-04

## 2020-11-02 RX ORDER — DIPHENHYDRAMINE HCL 25 MG
25 CAPSULE ORAL EVERY 4 HOURS PRN
Status: DISCONTINUED | OUTPATIENT
Start: 2020-11-02 | End: 2020-11-04

## 2020-11-02 RX ORDER — OXYCODONE HYDROCHLORIDE 5 MG/1
5 TABLET ORAL EVERY 4 HOURS PRN
Status: DISCONTINUED | OUTPATIENT
Start: 2020-11-02 | End: 2020-11-04

## 2020-11-02 RX ORDER — LIDOCAINE HYDROCHLORIDE 10 MG/ML
INJECTION, SOLUTION EPIDURAL; INFILTRATION; INTRACAUDAL; PERINEURAL AS NEEDED
Status: DISCONTINUED | OUTPATIENT
Start: 2020-11-02 | End: 2020-11-02 | Stop reason: SURG

## 2020-11-02 RX ORDER — DIPHENHYDRAMINE HYDROCHLORIDE 50 MG/ML
25 INJECTION INTRAMUSCULAR; INTRAVENOUS ONCE AS NEEDED
Status: ACTIVE | OUTPATIENT
Start: 2020-11-02 | End: 2020-11-02

## 2020-11-02 RX ORDER — SODIUM CHLORIDE, SODIUM LACTATE, POTASSIUM CHLORIDE, CALCIUM CHLORIDE 600; 310; 30; 20 MG/100ML; MG/100ML; MG/100ML; MG/100ML
INJECTION, SOLUTION INTRAVENOUS CONTINUOUS
Status: DISCONTINUED | OUTPATIENT
Start: 2020-11-02 | End: 2020-11-02 | Stop reason: ALTCHOICE

## 2020-11-02 RX ORDER — OXYCODONE HYDROCHLORIDE 5 MG/1
10 TABLET ORAL EVERY 4 HOURS PRN
Status: DISCONTINUED | OUTPATIENT
Start: 2020-11-02 | End: 2020-11-04

## 2020-11-02 RX ORDER — BISACODYL 10 MG
10 SUPPOSITORY, RECTAL RECTAL
Status: DISCONTINUED | OUTPATIENT
Start: 2020-11-02 | End: 2020-11-04

## 2020-11-02 RX ORDER — MIDAZOLAM HYDROCHLORIDE 1 MG/ML
INJECTION INTRAMUSCULAR; INTRAVENOUS AS NEEDED
Status: DISCONTINUED | OUTPATIENT
Start: 2020-11-02 | End: 2020-11-02 | Stop reason: SURG

## 2020-11-02 RX ORDER — PROCHLORPERAZINE EDISYLATE 5 MG/ML
10 INJECTION INTRAMUSCULAR; INTRAVENOUS EVERY 6 HOURS PRN
Status: DISCONTINUED | OUTPATIENT
Start: 2020-11-02 | End: 2020-11-04

## 2020-11-02 RX ORDER — MORPHINE SULFATE 4 MG/ML
4 INJECTION, SOLUTION INTRAMUSCULAR; INTRAVENOUS EVERY 10 MIN PRN
Status: DISCONTINUED | OUTPATIENT
Start: 2020-11-02 | End: 2020-11-02 | Stop reason: HOSPADM

## 2020-11-02 RX ORDER — SODIUM CHLORIDE, SODIUM LACTATE, POTASSIUM CHLORIDE, CALCIUM CHLORIDE 600; 310; 30; 20 MG/100ML; MG/100ML; MG/100ML; MG/100ML
INJECTION, SOLUTION INTRAVENOUS CONTINUOUS
Status: DISCONTINUED | OUTPATIENT
Start: 2020-11-02 | End: 2020-11-02 | Stop reason: HOSPADM

## 2020-11-02 RX ORDER — CEFAZOLIN SODIUM/WATER 2 G/20 ML
2 SYRINGE (ML) INTRAVENOUS EVERY 8 HOURS
Status: COMPLETED | OUTPATIENT
Start: 2020-11-02 | End: 2020-11-03

## 2020-11-02 RX ORDER — DEXAMETHASONE SODIUM PHOSPHATE 4 MG/ML
VIAL (ML) INJECTION AS NEEDED
Status: DISCONTINUED | OUTPATIENT
Start: 2020-11-02 | End: 2020-11-02 | Stop reason: SURG

## 2020-11-02 RX ORDER — HYDROMORPHONE HYDROCHLORIDE 1 MG/ML
0.6 INJECTION, SOLUTION INTRAMUSCULAR; INTRAVENOUS; SUBCUTANEOUS EVERY 5 MIN PRN
Status: DISCONTINUED | OUTPATIENT
Start: 2020-11-02 | End: 2020-11-02 | Stop reason: HOSPADM

## 2020-11-02 RX ORDER — GABAPENTIN 100 MG/1
100 CAPSULE ORAL 3 TIMES DAILY
Qty: 42 CAPSULE | Refills: 0 | Status: SHIPPED | OUTPATIENT
Start: 2020-11-02 | End: 2020-12-03 | Stop reason: ALTCHOICE

## 2020-11-02 RX ORDER — FAMOTIDINE 10 MG/ML
20 INJECTION, SOLUTION INTRAVENOUS 2 TIMES DAILY
Status: DISCONTINUED | OUTPATIENT
Start: 2020-11-02 | End: 2020-11-04

## 2020-11-02 RX ORDER — CELECOXIB 200 MG/1
200 CAPSULE ORAL ONCE
Status: COMPLETED | OUTPATIENT
Start: 2020-11-02 | End: 2020-11-02

## 2020-11-02 RX ORDER — HYDROCODONE BITARTRATE AND ACETAMINOPHEN 5; 325 MG/1; MG/1
1 TABLET ORAL AS NEEDED
Status: DISCONTINUED | OUTPATIENT
Start: 2020-11-02 | End: 2020-11-02 | Stop reason: HOSPADM

## 2020-11-02 RX ORDER — SODIUM PHOSPHATE, DIBASIC AND SODIUM PHOSPHATE, MONOBASIC 7; 19 G/133ML; G/133ML
1 ENEMA RECTAL ONCE AS NEEDED
Status: DISCONTINUED | OUTPATIENT
Start: 2020-11-02 | End: 2020-11-04

## 2020-11-02 RX ORDER — BUPIVACAINE HYDROCHLORIDE 7.5 MG/ML
INJECTION, SOLUTION INTRASPINAL AS NEEDED
Status: DISCONTINUED | OUTPATIENT
Start: 2020-11-02 | End: 2020-11-02 | Stop reason: SURG

## 2020-11-02 RX ORDER — PHENYLEPHRINE HCL 10 MG/ML
VIAL (ML) INJECTION AS NEEDED
Status: DISCONTINUED | OUTPATIENT
Start: 2020-11-02 | End: 2020-11-02 | Stop reason: SURG

## 2020-11-02 RX ORDER — ONDANSETRON 4 MG/1
4 TABLET, FILM COATED ORAL EVERY 6 HOURS PRN
Qty: 20 TABLET | Refills: 0 | Status: SHIPPED | OUTPATIENT
Start: 2020-11-02 | End: 2020-12-03 | Stop reason: ALTCHOICE

## 2020-11-02 RX ORDER — HYDROCODONE BITARTRATE AND ACETAMINOPHEN 5; 325 MG/1; MG/1
2 TABLET ORAL AS NEEDED
Status: DISCONTINUED | OUTPATIENT
Start: 2020-11-02 | End: 2020-11-02 | Stop reason: HOSPADM

## 2020-11-02 RX ORDER — SCOLOPAMINE TRANSDERMAL SYSTEM 1 MG/1
1 PATCH, EXTENDED RELEASE TRANSDERMAL ONCE
Status: DISCONTINUED | OUTPATIENT
Start: 2020-11-02 | End: 2020-11-04

## 2020-11-02 RX ORDER — DIPHENHYDRAMINE HYDROCHLORIDE 50 MG/ML
12.5 INJECTION INTRAMUSCULAR; INTRAVENOUS EVERY 4 HOURS PRN
Status: DISCONTINUED | OUTPATIENT
Start: 2020-11-02 | End: 2020-11-04

## 2020-11-02 RX ORDER — CYCLOBENZAPRINE HCL 5 MG
5 TABLET ORAL EVERY 8 HOURS PRN
Status: DISCONTINUED | OUTPATIENT
Start: 2020-11-02 | End: 2020-11-04

## 2020-11-02 RX ORDER — FAMOTIDINE 20 MG/1
20 TABLET ORAL ONCE
Status: COMPLETED | OUTPATIENT
Start: 2020-11-02 | End: 2020-11-02

## 2020-11-02 RX ORDER — ONDANSETRON 2 MG/ML
4 INJECTION INTRAMUSCULAR; INTRAVENOUS ONCE AS NEEDED
Status: DISCONTINUED | OUTPATIENT
Start: 2020-11-02 | End: 2020-11-02 | Stop reason: HOSPADM

## 2020-11-02 RX ORDER — ROSUVASTATIN CALCIUM 10 MG/1
10 TABLET, COATED ORAL NIGHTLY
Status: DISCONTINUED | OUTPATIENT
Start: 2020-11-02 | End: 2020-11-04

## 2020-11-02 RX ORDER — OXYCODONE HYDROCHLORIDE 5 MG/1
10 TABLET ORAL ONCE
Status: COMPLETED | OUTPATIENT
Start: 2020-11-02 | End: 2020-11-02

## 2020-11-02 RX ORDER — MORPHINE SULFATE 10 MG/ML
6 INJECTION, SOLUTION INTRAMUSCULAR; INTRAVENOUS EVERY 10 MIN PRN
Status: DISCONTINUED | OUTPATIENT
Start: 2020-11-02 | End: 2020-11-02 | Stop reason: HOSPADM

## 2020-11-02 RX ORDER — HYDROMORPHONE HYDROCHLORIDE 1 MG/ML
0.8 INJECTION, SOLUTION INTRAMUSCULAR; INTRAVENOUS; SUBCUTANEOUS EVERY 2 HOUR PRN
Status: DISCONTINUED | OUTPATIENT
Start: 2020-11-02 | End: 2020-11-04

## 2020-11-02 RX ORDER — HYDROMORPHONE HYDROCHLORIDE 1 MG/ML
0.4 INJECTION, SOLUTION INTRAMUSCULAR; INTRAVENOUS; SUBCUTANEOUS EVERY 2 HOUR PRN
Status: DISCONTINUED | OUTPATIENT
Start: 2020-11-02 | End: 2020-11-04

## 2020-11-02 RX ORDER — HYDROMORPHONE HYDROCHLORIDE 1 MG/ML
0.2 INJECTION, SOLUTION INTRAMUSCULAR; INTRAVENOUS; SUBCUTANEOUS EVERY 5 MIN PRN
Status: DISCONTINUED | OUTPATIENT
Start: 2020-11-02 | End: 2020-11-02 | Stop reason: HOSPADM

## 2020-11-02 RX ORDER — SODIUM CHLORIDE, SODIUM LACTATE, POTASSIUM CHLORIDE, CALCIUM CHLORIDE 600; 310; 30; 20 MG/100ML; MG/100ML; MG/100ML; MG/100ML
INJECTION, SOLUTION INTRAVENOUS CONTINUOUS
Status: DISCONTINUED | OUTPATIENT
Start: 2020-11-02 | End: 2020-11-04

## 2020-11-02 RX ORDER — TRAMADOL HYDROCHLORIDE 50 MG/1
TABLET ORAL EVERY 6 HOURS PRN
Qty: 60 TABLET | Refills: 0 | Status: SHIPPED | OUTPATIENT
Start: 2020-11-02 | End: 2020-12-03 | Stop reason: ALTCHOICE

## 2020-11-02 RX ORDER — ONDANSETRON 2 MG/ML
INJECTION INTRAMUSCULAR; INTRAVENOUS AS NEEDED
Status: DISCONTINUED | OUTPATIENT
Start: 2020-11-02 | End: 2020-11-02 | Stop reason: SURG

## 2020-11-02 RX ORDER — DEXTROSE MONOHYDRATE 25 G/50ML
50 INJECTION, SOLUTION INTRAVENOUS
Status: DISCONTINUED | OUTPATIENT
Start: 2020-11-02 | End: 2020-11-02

## 2020-11-02 RX ORDER — FAMOTIDINE 20 MG/1
20 TABLET ORAL 2 TIMES DAILY
Status: DISCONTINUED | OUTPATIENT
Start: 2020-11-02 | End: 2020-11-04

## 2020-11-02 RX ORDER — PROCHLORPERAZINE EDISYLATE 5 MG/ML
5 INJECTION INTRAMUSCULAR; INTRAVENOUS ONCE AS NEEDED
Status: DISCONTINUED | OUTPATIENT
Start: 2020-11-02 | End: 2020-11-02 | Stop reason: HOSPADM

## 2020-11-02 RX ORDER — OXYCODONE HYDROCHLORIDE 5 MG/1
TABLET ORAL EVERY 4 HOURS PRN
Qty: 60 TABLET | Refills: 0 | Status: SHIPPED | OUTPATIENT
Start: 2020-11-02 | End: 2020-12-03 | Stop reason: ALTCHOICE

## 2020-11-02 RX ORDER — HYDROMORPHONE HYDROCHLORIDE 1 MG/ML
0.2 INJECTION, SOLUTION INTRAMUSCULAR; INTRAVENOUS; SUBCUTANEOUS EVERY 2 HOUR PRN
Status: DISCONTINUED | OUTPATIENT
Start: 2020-11-02 | End: 2020-11-04

## 2020-11-02 RX ORDER — TRAMADOL HYDROCHLORIDE 50 MG/1
100 TABLET ORAL EVERY 6 HOURS PRN
Status: DISCONTINUED | OUTPATIENT
Start: 2020-11-02 | End: 2020-11-04

## 2020-11-02 RX ORDER — TRANEXAMIC ACID 10 MG/ML
INJECTION, SOLUTION INTRAVENOUS AS NEEDED
Status: DISCONTINUED | OUTPATIENT
Start: 2020-11-02 | End: 2020-11-02 | Stop reason: SURG

## 2020-11-02 RX ORDER — CEFAZOLIN SODIUM/WATER 2 G/20 ML
2 SYRINGE (ML) INTRAVENOUS ONCE
Status: COMPLETED | OUTPATIENT
Start: 2020-11-02 | End: 2020-11-02

## 2020-11-02 RX ORDER — PANTOPRAZOLE SODIUM 40 MG/1
40 TABLET, DELAYED RELEASE ORAL DAILY
Qty: 30 TABLET | Refills: 0 | Status: SHIPPED | OUTPATIENT
Start: 2020-11-02 | End: 2020-12-03 | Stop reason: ALTCHOICE

## 2020-11-02 RX ORDER — POLYETHYLENE GLYCOL 3350 17 G/17G
17 POWDER, FOR SOLUTION ORAL DAILY PRN
Status: DISCONTINUED | OUTPATIENT
Start: 2020-11-02 | End: 2020-11-04

## 2020-11-02 RX ORDER — MORPHINE SULFATE 4 MG/ML
2 INJECTION, SOLUTION INTRAMUSCULAR; INTRAVENOUS EVERY 10 MIN PRN
Status: DISCONTINUED | OUTPATIENT
Start: 2020-11-02 | End: 2020-11-02 | Stop reason: HOSPADM

## 2020-11-02 RX ORDER — EZETIMIBE 10 MG/1
10 TABLET ORAL EVERY MORNING
Status: DISCONTINUED | OUTPATIENT
Start: 2020-11-03 | End: 2020-11-04

## 2020-11-02 RX ORDER — METOCLOPRAMIDE 10 MG/1
10 TABLET ORAL ONCE
Status: COMPLETED | OUTPATIENT
Start: 2020-11-02 | End: 2020-11-02

## 2020-11-02 RX ORDER — ONDANSETRON 2 MG/ML
4 INJECTION INTRAMUSCULAR; INTRAVENOUS EVERY 4 HOURS PRN
Status: DISCONTINUED | OUTPATIENT
Start: 2020-11-02 | End: 2020-11-04

## 2020-11-02 RX ORDER — DOCUSATE SODIUM 100 MG/1
100 CAPSULE, LIQUID FILLED ORAL 2 TIMES DAILY
Status: DISCONTINUED | OUTPATIENT
Start: 2020-11-02 | End: 2020-11-04

## 2020-11-02 RX ORDER — HYDROMORPHONE HYDROCHLORIDE 1 MG/ML
0.4 INJECTION, SOLUTION INTRAMUSCULAR; INTRAVENOUS; SUBCUTANEOUS EVERY 5 MIN PRN
Status: DISCONTINUED | OUTPATIENT
Start: 2020-11-02 | End: 2020-11-02 | Stop reason: HOSPADM

## 2020-11-02 RX ORDER — ACETAMINOPHEN 325 MG/1
650 TABLET ORAL EVERY 6 HOURS
Status: DISCONTINUED | OUTPATIENT
Start: 2020-11-02 | End: 2020-11-04

## 2020-11-02 RX ORDER — GABAPENTIN 100 MG/1
100 CAPSULE ORAL 3 TIMES DAILY
Status: DISCONTINUED | OUTPATIENT
Start: 2020-11-02 | End: 2020-11-04

## 2020-11-02 RX ORDER — ACETAMINOPHEN 500 MG
1000 TABLET ORAL ONCE
Status: COMPLETED | OUTPATIENT
Start: 2020-11-02 | End: 2020-11-02

## 2020-11-02 RX ORDER — MAGNESIUM HYDROXIDE 1200 MG/15ML
LIQUID ORAL CONTINUOUS PRN
Status: COMPLETED | OUTPATIENT
Start: 2020-11-02 | End: 2020-11-02

## 2020-11-02 RX ORDER — NALOXONE HYDROCHLORIDE 0.4 MG/ML
80 INJECTION, SOLUTION INTRAMUSCULAR; INTRAVENOUS; SUBCUTANEOUS AS NEEDED
Status: DISCONTINUED | OUTPATIENT
Start: 2020-11-02 | End: 2020-11-02 | Stop reason: HOSPADM

## 2020-11-02 RX ORDER — ACETAMINOPHEN 500 MG
TABLET ORAL EVERY 6 HOURS PRN
Qty: 60 TABLET | Refills: 2 | Status: SHIPPED | OUTPATIENT
Start: 2020-11-02 | End: 2021-03-08

## 2020-11-02 RX ORDER — TRANEXAMIC ACID 10 MG/ML
1000 INJECTION, SOLUTION INTRAVENOUS ONCE
Status: COMPLETED | OUTPATIENT
Start: 2020-11-02 | End: 2020-11-02

## 2020-11-02 RX ORDER — DOCUSATE SODIUM 100 MG/1
100 CAPSULE, LIQUID FILLED ORAL 2 TIMES DAILY
Qty: 60 CAPSULE | Refills: 1 | Status: SHIPPED | OUTPATIENT
Start: 2020-11-02 | End: 2020-12-03 | Stop reason: ALTCHOICE

## 2020-11-02 RX ADMIN — DEXAMETHASONE SODIUM PHOSPHATE 4 MG: 4 MG/ML VIAL (ML) INJECTION at 14:09:00

## 2020-11-02 RX ADMIN — PHENYLEPHRINE HCL 50 MCG: 10 MG/ML VIAL (ML) INJECTION at 14:20:00

## 2020-11-02 RX ADMIN — SODIUM CHLORIDE, SODIUM LACTATE, POTASSIUM CHLORIDE, CALCIUM CHLORIDE: 600; 310; 30; 20 INJECTION, SOLUTION INTRAVENOUS at 16:00:00

## 2020-11-02 RX ADMIN — CEFAZOLIN SODIUM/WATER 2 G: 2 G/20 ML SYRINGE (ML) INTRAVENOUS at 14:17:00

## 2020-11-02 RX ADMIN — MIDAZOLAM HYDROCHLORIDE 2 MG: 1 INJECTION INTRAMUSCULAR; INTRAVENOUS at 13:57:00

## 2020-11-02 RX ADMIN — PHENYLEPHRINE HCL 100 MCG: 10 MG/ML VIAL (ML) INJECTION at 15:16:00

## 2020-11-02 RX ADMIN — LIDOCAINE HYDROCHLORIDE 50 MG: 10 INJECTION, SOLUTION EPIDURAL; INFILTRATION; INTRACAUDAL; PERINEURAL at 14:05:00

## 2020-11-02 RX ADMIN — BUPIVACAINE HYDROCHLORIDE 1.7 ML: 7.5 INJECTION, SOLUTION INTRASPINAL at 14:04:00

## 2020-11-02 RX ADMIN — TRANEXAMIC ACID 1000 MG: 10 INJECTION, SOLUTION INTRAVENOUS at 14:10:00

## 2020-11-02 RX ADMIN — PHENYLEPHRINE HCL 50 MCG: 10 MG/ML VIAL (ML) INJECTION at 14:35:00

## 2020-11-02 RX ADMIN — ONDANSETRON 4 MG: 2 INJECTION INTRAMUSCULAR; INTRAVENOUS at 14:09:00
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
## 2020-11-02 NOTE — ANESTHESIA PROCEDURE NOTES
Spinal Block  Performed by: James Castro CRNA  Authorized by: Edy Stockton MD       General Information and Staff    Start Time:  11/2/2020 2:00 PM  End Time:  11/2/2020 2:04 PM  Anesthesiologist:  Edy Stockton MD  CRNA:  James Castro

## 2020-11-02 NOTE — H&P
[address] [facility] Patient Status:  Inpatient    3/19/1960 MRN [id number]   Location [facility] PRE OP RECOVERY Attending Bin Cantrell MD   Hosp Day # 0 PCP Lisa Francisco MD     Date:  2020 Social History:  Social History    Tobacco Use      Smoking status: Never Smoker      Smokeless tobacco: Never Used    Alcohol use: Yes      Alcohol/week: 2.0 standard drinks      Types: 2 Standard drinks or equivalent per week      Binge frequency:  We CO2 25.0 10/15/2020    GLU 77 10/15/2020    CA 9.7 10/15/2020    ALB 4.0 10/15/2020    ALKPHO 108 10/15/2020    BILT 0.6 10/15/2020    TP 8.2 10/15/2020    AST 30 10/15/2020    ALT 30 10/15/2020    INR 0.97 04/23/2018    TSH 0.645 08/18/2020    GGT 29 0

## 2020-11-02 NOTE — ANESTHESIA POSTPROCEDURE EVALUATION
Patient: Marisel Oropeza    Procedure Summary     Date: 11/02/20 Room / Location: [address] [place] [address] MAIN OR 05 / [facility] MAIN OR    Anesthesia Start: 4964 Anesthesia Stop: 1157    Procedure: HIP TOTAL ARTHROPLASTY REVISION (Left Hip) Diagnosis: (mechanical complication of l

## 2020-11-03 PROCEDURE — 99233 SBSQ HOSP IP/OBS HIGH 50: CPT | Performed by: HOSPITALIST

## 2020-11-03 NOTE — OCCUPATIONAL THERAPY NOTE
OCCUPATIONAL THERAPY EVALUATION - INPATIENT     Room Number: 430/430-A  Evaluation Date: 11/3/2020  Type of Evaluation: Initial  Presenting Problem: (L hip revision)    Physician Order: IP Consult to Occupational Therapy  Reason for Therapy: ADL/IADL Dys Occupational Therapy services. Please re-order if a new functional limitation presents during this admission.     OCCUPATIONAL THERAPY MEDICAL/SOCIAL HISTORY     Problem List  Principal Problem:    Failed total hip arthroplasty ([address]  Active Problems:    OS hip surgery\"    OCCUPATIONAL THERAPY EXAMINATION      OBJECTIVE  Precautions: Limb alert - left;AMI - posterior  Fall Risk: Standard fall risk    WEIGHT BEARING RESTRICTION  L Lower Extremity: Toe Touch Weight Bearing    PAIN ASSESSMENT  Ratin    ACTI

## 2020-11-03 NOTE — PHYSICAL THERAPY NOTE
PHYSICAL THERAPY HIP EVALUATION - INPATIENT     Room Number: 430/430-A  Evaluation Date: 11/3/2020  Type of Evaluation: Initial  Physician Order: PT Eval and Treat    Presenting Problem: failed AMI s/p left hip revision  Reason for Therapy: Mobility Dysfun conservation;Patient education; Family education;Gait training;Strengthening;Balance training;Stair training;Transfer training  Rehab Potential : Good  Frequency (Obs): Daily       PHYSICAL THERAPY MEDICAL/SOCIAL HISTORY     History related to current admis risk    WEIGHT BEARING RESTRICTION  Weight Bearing Restriction: L lower extremity           L Lower Extremity: Toe Touch Weight Bearing    PAIN ASSESSMENT  Rating: Unable to rate  Location: left hip  Management Techniques: Activity promotion; Body mechanics training  Posture  Strengthening  Transfer training    Patient End of Session: Needs met;Call light within reach;RN aware of session/findings; In bed; All patient questions and concerns addressed    CURRENT GOALS    Goals to be met by: 11/10/20  Patient Goal

## 2020-11-03 NOTE — PLAN OF CARE
Bonny Dakins is from home alone. Pod 0 left hip revision. Cms decreased s/p spinal. Mellissa diet-denies pain. Sx site with aquacel drsg c/d/I-no s/s infection-afebrile. Safety intact. Hip prec in place-abd wedge while in bed. PT/OT eval pending.  Eliquis/scds-plan pernell techniques  - Monitor for opioid side effects  - Notify MD/LIP if interventions unsuccessful or patient reports new pain  - Anticipate increased pain with activity and pre-medicate as appropriate  Outcome: Progressing     Problem: RISK FOR INFECTION - ADUL or social support system  Outcome: Progressing

## 2020-11-03 NOTE — PROGRESS NOTES
[facility]    Progress Note    Marjorie Aguillon Patient Status:  Inpatient    3/19/1960 MRN [id number]   Location [facility] 4W/SW/SE Attending Antonio Gaitan MD   Hosp Day # 1 PCP Ovidio Hanley MD       Subjective:     Doing 4.0 11/02/2020     11/02/2020    CO2 26.0 11/02/2020     (H) 11/02/2020    CA 9.2 11/02/2020    ALB 3.7 11/02/2020    ALKPHO 98 11/02/2020    BILT 0.4 11/02/2020    TP 7.1 11/02/2020    AST 27 11/02/2020    ALT 27 11/02/2020    INR 0.97 04/23/

## 2020-11-03 NOTE — PROGRESS NOTES
[facility]    Progress Note    Patricia Vicente Patient Status:  Inpatient    3/19/1960 MRN [id number]   Location [facility] 4W/SW/SE Attending Lisa Golden MD   Hosp Day # 1 PCP Alberto Kenney MD       Subjective:   Kira Nair Reconstruction  [facility]: [phone]  F: [phone]

## 2020-11-03 NOTE — PROGRESS NOTES
[facility]    Progress Note    Marisel Oropeza Patient Status:  Inpatient    3/19/1960 MRN [id number]   Location [facility] 4W/SW/SE Attending Gricelda Fisher MD;[employer]*   Hosp Day # 0 PCP Harika Lynch MD        Subjective: PROTOCOL, MONITOR. Pure hypercholesterolemia  CONT HOME MEDS.              Results:     Lab Results   Component Value Date    WBC 7.0 10/15/2020    HGB 14.7 11/02/2020    HCT 45.2 11/02/2020    .0 10/15/2020    CREATSERUM 0.79 10/15/2020    BUN

## 2020-11-03 NOTE — PROGRESS NOTES
Will await final culture on aerobic and anaerobic samples. At this time, tissue culture not c/w synovial fluid samples (, Neut 61%). If no aerobic/ anaerobic growth, no need for ID consult and likely a false positive.      D/w Dr. Curtis Zaman and in agree

## 2020-11-03 NOTE — PLAN OF CARE
Problem: Patient Centered Care  Goal: Patient preferences are identified and integrated in the patient's plan of care  Description: Interventions:  - What would you like us to know as we care for you?  Was a   - Provide timely, complete, and acc period  Description: INTERVENTIONS  - Monitor WBC  - Administer growth factors as ordered  - Implement neutropenic guidelines  Outcome: Progressing     Problem: SAFETY ADULT - FALL  Goal: Free from fall injury  Description: INTERVENTIONS:  - Assess pt freq packs. Plan is to be discharged home with [facility]

## 2020-11-03 NOTE — RESPIRATORY THERAPY NOTE
Pt placed on CPAP for the night, requested home settings. Pt tolerating well. Will continue to monitor.

## 2020-11-04 ENCOUNTER — TELEPHONE (OUTPATIENT)
Dept: FAMILY MEDICINE CLINIC | Facility: CLINIC | Age: 60
End: 2020-11-04

## 2020-11-04 VITALS
OXYGEN SATURATION: 93 % | DIASTOLIC BLOOD PRESSURE: 60 MMHG | HEART RATE: 86 BPM | HEIGHT: 66 IN | TEMPERATURE: 99 F | BODY MASS INDEX: 33.75 KG/M2 | SYSTOLIC BLOOD PRESSURE: 110 MMHG | WEIGHT: 210 LBS | RESPIRATION RATE: 18 BRPM

## 2020-11-04 PROCEDURE — 99239 HOSP IP/OBS DSCHRG MGMT >30: CPT | Performed by: HOSPITALIST

## 2020-11-04 NOTE — PHYSICAL THERAPY NOTE
PHYSICAL THERAPY TREATMENT NOTE - INPATIENT     Room Number: 430/430-A       Presenting Problem: failed AMI s/p left hip revision    Problem List  Principal Problem:    Failed total hip arthroplasty ([address]  Active Problems:    Pre-op testing    MEI (obstruc Static Sitting: Good  Dynamic Sitting: Good           Static Standing: Good  Dynamic Standing: Good    ACTIVITY TOLERANCE                         O2 WALK assistance level: modified independent    Goal #3   Current Status 300 feet with RW Supervision    Goal #4 Patient will negotiate 4 stairs/one curb w/ assistive device and supervision   Goal #4   Current Status Goal met    Goal #5 Patient verbalizes and/or

## 2020-11-04 NOTE — DISCHARGE SUMMARY
[facility] HOSPITALIST  DISCHARGE SUMMARY     Nada Hashimoto Patient Status:  Inpatient    3/19/1960 MRN [id number]   Location [facility] 4W/SW/SE Attending Gallito Mota MD   Hosp Day # 2 PCP Natacha Lopez MD     DATE OF ADMISSION: 2020  DA Neuro: Normal reflexes, CN. Sensory/motor exams grossly normal deficit. Coordination  and gait normal.   MS: No joint effusions. No peripheral edema. Incision clean dry and intact  Skin: Skin is warm and dry. No rashes, erythema, diaphoresis.    Psych: Tabs      Take 1 tablet by mouth 2 (two) times daily with meals. Refills: 0     ezetimibe 10 MG Tabs  Commonly known as: ZETIA      Take 10 mg by mouth every morning.    Refills: 0     ICAPS LUTEIN & ZEAXANTHIN OR      Take 1 capsule by mouth every other

## 2020-11-04 NOTE — CM/SW NOTE
LORETA followed up on DC planning. LORETA received [place] [id number] orders and F2F. No accepting [place] [id number] agency at this time, additional referrals sent - pending acceptance     Addendum, 11/5/2020    LORETA followed up and found [facility] who accepted the patient.      [address] [facility]

## 2020-11-04 NOTE — TELEPHONE ENCOUNTER
[facility] from [facility] for patient after Left hip revision. [facility] [place] [facility] would like know know if you would sign off on home visit orders.  Thanks

## 2020-11-04 NOTE — PLAN OF CARE
Problem: Patient Centered Care  Goal: Patient preferences are identified and integrated in the patient's plan of care  Description: Interventions:  - What would you like us to know as we care for you?   - Provide timely, complete, and accurate informatio period  Description: INTERVENTIONS  - Monitor WBC  - Administer growth factors as ordered  - Implement neutropenic guidelines  Outcome: Progressing     Problem: SAFETY ADULT - FALL  Goal: Free from fall injury  Description: INTERVENTIONS:  - Assess pt freq culture results. Plan is for home with home health.

## 2020-11-04 NOTE — FACE TO FACE NOTE
Face to Face Encounter    I (or a non-physician practitioner working in collaboration with me) had a face to face encounter with this patient during which a medical condition was addressed which is the primary reason for home health care on : 11/4/2020

## 2020-11-04 NOTE — PROGRESS NOTES
[facility]    Progress Note    Nada Hashimoto Patient Status:  Inpatient    3/19/1960 MRN [id number]   Location [facility] 4W/SW/SE Attending Gallito Mota MD   Hosp Day # 2 PCP Natacha Lopez MD       Subjective:   Keith Mensah 11/2/2020  CONCLUSION:  1. Bilateral hip prosthesis in anatomic position. Recent postop changes following left hip arthroplasty.  2. No acute fracture dislocation    Dictated by (CST): Pavel Griggs MD on 11/02/2020 at 5:26 PM     Finalized by (CST):

## 2020-11-04 NOTE — PLAN OF CARE
Problem: Patient Centered Care  Goal: Patient preferences are identified and integrated in the patient's plan of care  Description: Interventions:  - What would you like us to know as we care for you? Patient has got family good support system.   - Provid non-pharmacological measures as appropriate and evaluate response  - Consider cultural and social influences on pain and pain management  - Manage/alleviate anxiety  - Utilize distraction and/or relaxation techniques  - Monitor for opioid side effects  - N managing their own health  - Refer to Case Management Department for coordinating discharge planning if the patient needs post-hospital services based on physician/LIP order or complex needs related to functional status, cognitive ability or social support

## 2020-11-05 NOTE — TELEPHONE ENCOUNTER
Reggie Lynch from [facility] informed that Dr. Jim Lau will sign paperwork. Reggie Lynch thanked me for the call.

## 2020-11-09 NOTE — OPERATIVE REPORT
[facility]    PATIENT'S NAME: Ena Cabrera   ATTENDING PHYSICIAN: Andrés Fenton MD   OPERATING PHYSICIAN: John Ramsey MD   PATIENT ACCOUNT#:   [id number]    LOCATION:  [address] [place] [address] #:   [id number]       DATE OF BIRTH: with type 2C acetabular defect. There were no signs of pelvic discontinuity and no signs of infection. OPERATIVE TECHNIQUE:  The patient was admitted to the same-day surgical program on 11/02/2020.   Following proper identification in the preoperative h acetabular defect. There were no signs of a pelvic discontinuity. The defect was  subsequently reamed with hemispherical reamers to a final reaming of 58 mm and a 58 mm reamer engaged the anterior superior as well as posterior inferior bone.   Subsequentl

## 2020-11-19 ENCOUNTER — OFFICE VISIT (OUTPATIENT)
Dept: PHYSICAL THERAPY | Age: 60
End: 2020-11-19
Attending: ORTHOPAEDIC SURGERY
Payer: COMMERCIAL

## 2020-11-19 ENCOUNTER — TELEPHONE (OUTPATIENT)
Dept: FAMILY MEDICINE CLINIC | Facility: CLINIC | Age: 60
End: 2020-11-19

## 2020-11-19 PROCEDURE — 97110 THERAPEUTIC EXERCISES: CPT

## 2020-11-19 PROCEDURE — 97162 PT EVAL MOD COMPLEX 30 MIN: CPT

## 2020-11-19 NOTE — TELEPHONE ENCOUNTER
Pt called and states she needs an order for a screening mammogram entered into epic so she can schedule through her MyChart. Please advise on order.

## 2020-11-19 NOTE — TELEPHONE ENCOUNTER
Patient is planning to schedule mammo through [facility] in [place]. Informed patient order is already in Epic and she may schedule at her convenience.

## 2020-11-19 NOTE — PROGRESS NOTES
POST-OP HIP EVALUATION:   Referring Physician: Dr. Nupur Quintero  Diagnosis: L THR revision 11/2/2020     Date of Service: 11/19/2020     PATIENT SUMMARY   Nithin Dobson is a 61year old female who presents to therapy today s/p L AMI with posterior approach on dermatomes    AROM:   Hip   Flexion: R 100; L 90  Extension: R 5; L 0  Abduction: R 35; L NT  ER: R 15; L NT  IR: R 5; L 5       Flexibility:   Hamstrings: R Moderate tightness; L Moderate tightness  Piriformis: R Mild tightness; L NT  Quads: R Moderate ti such as community ambulation  · Pt will be able to squat to  light objects around the house with <2/10 hip pain   · Pt will improve functional hip strength to report ability to ascend/descend 1 flight of stairs reciprocally without use of handrail

## 2020-11-20 ENCOUNTER — OFFICE VISIT (OUTPATIENT)
Dept: PHYSICAL THERAPY | Age: 60
End: 2020-11-20
Attending: ORTHOPAEDIC SURGERY
Payer: COMMERCIAL

## 2020-11-20 ENCOUNTER — PATIENT MESSAGE (OUTPATIENT)
Dept: FAMILY MEDICINE CLINIC | Facility: CLINIC | Age: 60
End: 2020-11-20

## 2020-11-20 PROCEDURE — 97110 THERAPEUTIC EXERCISES: CPT

## 2020-11-20 NOTE — PROGRESS NOTES
Dx: L THR revision 11/2/20         Insurance (Authorized # of Visits):   O           Authorizing Physician: Dr. Tami Ford Next MD visit: none scheduled  Fall Risk: standard         Precautions: toe touch weight bearing, no active hip abd            Arnie pumps    Charges: therapeutic ex 3       Total Timed Treatment: 40 min  Total Treatment Time: 40 min

## 2020-11-23 ENCOUNTER — OFFICE VISIT (OUTPATIENT)
Dept: PHYSICAL THERAPY | Age: 60
End: 2020-11-23
Attending: ORTHOPAEDIC SURGERY
Payer: COMMERCIAL

## 2020-11-23 PROCEDURE — 97110 THERAPEUTIC EXERCISES: CPT

## 2020-11-23 NOTE — PROGRESS NOTES
Dx: L THR revision 11/2/20         Insurance (Authorized # of Visits):   O           Authorizing Physician: Dr. Pooja Parry MD visit: none scheduled  Fall Risk: standard         Precautions: toe touch weight bearing, no active hip abd            Yamel Hernandez 10'  gastroc stretch 4x30\"  Hamstring stretch 4x30\"  Hip add stretch 4x30\"  SL rectus/IP stretch 4x30\"  SAQ 4# 3x10  Bridge 3x10  Hip add squeeze 3x10  Hip PROM all planes                           HEP: bridge, saq,ankle pumps    Charges: therapeutic e

## 2020-11-25 ENCOUNTER — TELEPHONE (OUTPATIENT)
Dept: FAMILY MEDICINE CLINIC | Facility: CLINIC | Age: 60
End: 2020-11-25

## 2020-11-25 ENCOUNTER — OFFICE VISIT (OUTPATIENT)
Dept: PHYSICAL THERAPY | Age: 60
End: 2020-11-25
Attending: ORTHOPAEDIC SURGERY
Payer: COMMERCIAL

## 2020-11-25 ENCOUNTER — PATIENT OUTREACH (OUTPATIENT)
Dept: CASE MANAGEMENT | Age: 60
End: 2020-11-25

## 2020-11-25 PROCEDURE — 97110 THERAPEUTIC EXERCISES: CPT

## 2020-11-25 NOTE — PROGRESS NOTES
Dx: L THR revision 11/2/20         Insurance (Authorized # of Visits):   O           Authorizing Physician: Dr. Tami Ford Next MD visit: none scheduled  Fall Risk: standard         Precautions: toe touch weight bearing, no active hip abd            Comfort Escobar stretch 4x30\"  Hip add stretch 4x30\"  SL rectus/IP stretch 4x30\"  SAQ 4# 3x10  Bridge 3x10  Hip add squeeze 3x10  Hip PROM all planes THERAPEUTIC EX  Bike 10'  gastroc stretch 4x30\"  Hamstring stretch 4x30\"  Hip add stretch 4x30\"  SL rectus/IP stretc

## 2020-11-25 NOTE — TELEPHONE ENCOUNTER
Called pt schedule sleep f/u appt, wants to know if it will be covered by her ins. States that she now has HMO. Would like a call back before scheduling. Called pt schedule sleep f/u appt, wants to know if it will be covered by her ins.  States that she now

## 2020-11-30 ENCOUNTER — OFFICE VISIT (OUTPATIENT)
Dept: PHYSICAL THERAPY | Age: 60
End: 2020-11-30
Attending: ORTHOPAEDIC SURGERY
Payer: COMMERCIAL

## 2020-11-30 PROCEDURE — 97110 THERAPEUTIC EXERCISES: CPT

## 2020-11-30 RX ORDER — ROSUVASTATIN CALCIUM 10 MG/1
10 TABLET, COATED ORAL NIGHTLY
Qty: 90 TABLET | Refills: 1 | Status: SHIPPED | OUTPATIENT
Start: 2020-11-30 | End: 2021-06-01

## 2020-11-30 RX ORDER — EZETIMIBE 10 MG/1
10 TABLET ORAL EVERY MORNING
Qty: 30 TABLET | Refills: 0 | Status: CANCELLED | OUTPATIENT
Start: 2020-11-30

## 2020-11-30 NOTE — PROGRESS NOTES
Dx: L THR revision 11/2/20         Insurance (Authorized # of Visits):   O           Authorizing Physician: Dr. Mueller Persons Next MD visit: none scheduled  Fall Risk: standard         Precautions: toe touch weight bearing, no active hip abd            Renate Gehrig stretch 4x30\"  Hip add stretch 4x30\"  SL rectus/IP stretch 4x30\"  SAQ 4# 3x10  Bridge 3x10  Hip add squeeze 3x10  Hip PROM all planes THERAPEUTIC EX  Bike 10'  gastroc stretch 4x30\"  Hamstring stretch 4x30\"  Hip add stretch 4x30\"  SL rectus/IP stretc

## 2020-11-30 NOTE — TELEPHONE ENCOUNTER
Pt has refill request for Rosucastatin 5mg tabs(Last filled 8/20/20 for 90 tabs w/ 1 refill) and Ezetimibe 10 mg tabs(Last filled unknown w/ unknown amount prescribed).     Side Note: Pt didn't have prescribed amount for Ezetimibe so I entered 30 tabs with

## 2020-11-30 NOTE — TELEPHONE ENCOUNTER
Patient states her HMO continues to cover [employer]. Scheduled sleep follow up.     Future Appointments   Date Time Provider Katie Poe   12/2/2020  9:15 AM Lori Glover APRN [facility] [place]   12/3/2020  9:15 AM QUANG [place]ara

## 2020-11-30 NOTE — TELEPHONE ENCOUNTER
Dr. Nabor Orozco is out of the office today. Refill done for the rosuvastatin. Please clarify with patient about the ezetimibe (Zetia) since it was not on her med list when she did her pre-op with Dr. Nabor Orozco. Thank you.

## 2020-12-03 ENCOUNTER — OFFICE VISIT (OUTPATIENT)
Dept: FAMILY MEDICINE CLINIC | Facility: CLINIC | Age: 60
End: 2020-12-03

## 2020-12-03 ENCOUNTER — OFFICE VISIT (OUTPATIENT)
Dept: PHYSICAL THERAPY | Age: 60
End: 2020-12-03
Attending: ORTHOPAEDIC SURGERY
Payer: COMMERCIAL

## 2020-12-03 VITALS
DIASTOLIC BLOOD PRESSURE: 70 MMHG | OXYGEN SATURATION: 98 % | WEIGHT: 223.19 LBS | RESPIRATION RATE: 16 BRPM | HEIGHT: 66 IN | TEMPERATURE: 98 F | SYSTOLIC BLOOD PRESSURE: 106 MMHG | BODY MASS INDEX: 35.87 KG/M2 | HEART RATE: 80 BPM

## 2020-12-03 DIAGNOSIS — G47.33 OSA (OBSTRUCTIVE SLEEP APNEA): Primary | ICD-10-CM

## 2020-12-03 PROCEDURE — 3008F BODY MASS INDEX DOCD: CPT | Performed by: NURSE PRACTITIONER

## 2020-12-03 PROCEDURE — 99214 OFFICE O/P EST MOD 30 MIN: CPT | Performed by: NURSE PRACTITIONER

## 2020-12-03 PROCEDURE — 3078F DIAST BP <80 MM HG: CPT | Performed by: NURSE PRACTITIONER

## 2020-12-03 PROCEDURE — 3074F SYST BP LT 130 MM HG: CPT | Performed by: NURSE PRACTITIONER

## 2020-12-03 PROCEDURE — 1111F DSCHRG MED/CURRENT MED MERGE: CPT | Performed by: NURSE PRACTITIONER

## 2020-12-03 PROCEDURE — 97110 THERAPEUTIC EXERCISES: CPT

## 2020-12-03 NOTE — PROGRESS NOTES
Dx: L THR revision 11/2/20         Insurance (Authorized # of Visits):   O           Authorizing Physician: Dr. Heather Bullard Next MD visit: none scheduled  Fall Risk: standard         Precautions: toe touch weight bearing, no active hip abd            [place] [address] 10'  gastroc stretch 4x30\"  Hamstring stretch 4x30\"  Hip add stretch 4x30\"  SL rectus/IP stretch 4x30\"  SAQ 4# 3x10  Bridge 3x10  Hip add squeeze 3x10  Hip PROM all planes THERAPEUTIC EX  Bike 10'  gastroc stretch 4x30\"  Hamstring stretch 4x30\"  Hip

## 2020-12-03 NOTE — PROGRESS NOTES
HPI:    Patient ID: Lior Chavez is a 61year old female. HPI     Patient is present for a follow-up on sleep therapy. Had hip replacement on 11/2. Doing well with this. States that she has been working on losing weight.    Had lost prior to the s Nose: Nose normal.   Mouth/Throat: Oropharynx is clear and moist.   Mal 3 tonsils 0   Eyes: Conjunctivae are normal.   Neck: Normal range of motion. Neck supple. No thyromegaly present.    Cardiovascular: Normal rate, regular rhythm, normal heart sounds a

## 2020-12-04 ENCOUNTER — APPOINTMENT (OUTPATIENT)
Dept: PHYSICAL THERAPY | Age: 60
End: 2020-12-04
Attending: ORTHOPAEDIC SURGERY
Payer: COMMERCIAL

## 2020-12-08 ENCOUNTER — OFFICE VISIT (OUTPATIENT)
Dept: PHYSICAL THERAPY | Age: 60
End: 2020-12-08
Attending: ORTHOPAEDIC SURGERY
Payer: COMMERCIAL

## 2020-12-08 PROCEDURE — 97110 THERAPEUTIC EXERCISES: CPT

## 2020-12-08 NOTE — PROGRESS NOTES
Dx: L THR revision 11/2/20         Insurance (Authorized # of Visits):   O           Authorizing Physician: Dr. Salvador Barker Next MD visit: none scheduled  Fall Risk: standard         Precautions: toe touch weight bearing, no active hip abd            Arnie 4x30\"  Hamstring stretch 4x30\"  Hip add stretch 4x30\"  SL rectus/IP stretch 4x30\"  SAQ 4# 3x10  Bridge 3x10  Hip add squeeze 3x10  SB ham curl 3x10  SB TA Bracing with SB push 3x10  Stand ham curl 3x10  Hip PROM all planes THERAPEUTIC EX  Bike 10'  gas

## 2020-12-10 ENCOUNTER — OFFICE VISIT (OUTPATIENT)
Dept: PHYSICAL THERAPY | Age: 60
End: 2020-12-10
Attending: ORTHOPAEDIC SURGERY
Payer: COMMERCIAL

## 2020-12-10 PROCEDURE — 97110 THERAPEUTIC EXERCISES: CPT

## 2020-12-10 NOTE — PROGRESS NOTES
Dx: L THR revision 11/2/20         Insurance (Authorized # of Visits):   O           Authorizing Physician: Dr. Sade Delacruz Next MD visit: none scheduled  Fall Risk: standard         Precautions: toe touch weight bearing, no active hip abd            Arnie 10'  gastroc stretch 4x30\"  Hamstring stretch 4x30\"  Hip add stretch 4x30\"  SL rectus/IP stretch 4x30\"  SAQ 4# 3x10  Single leg bridge 3x10  Hip add squeeze 3x10  SB ham curl 3x10  SB TA Bracing with SB push 3x10  Stand ham curl 3x10  Hip PROM all plan

## 2020-12-15 ENCOUNTER — OFFICE VISIT (OUTPATIENT)
Dept: PHYSICAL THERAPY | Age: 60
End: 2020-12-15
Attending: ORTHOPAEDIC SURGERY
Payer: COMMERCIAL

## 2020-12-15 PROCEDURE — 97110 THERAPEUTIC EXERCISES: CPT

## 2020-12-15 NOTE — PROGRESS NOTES
Progress Summary  Pt has attended 9 visits in Physical Therapy. Dx: L THR revision 11/2/20         Insurance (Authorized # of Visits):   HMO           Authorizing Physician: Dr. Luis Baig Next MD visit: none scheduled  Fall Risk: standard         Precaution independent and compliant with comprehensive HEP to maintain progress achieved in PT-Met        Plan: Continue skilled Physical Therapy 2 x/week or a total of an additional 8 visits over a 90 day period.  Treatment will include: therapeutic exercise       P stretch 4x30\"  Hamstring stretch 4x30\"  Hip add stretch 4x30\"  SL rectus/IP stretch 4x30\"  SAQ 6# 3x10  Single leg bridge 3x10  Hip add squeeze 3x10  SB TA Bracing with SB push 3x10  Stand ham curl 3x10  Hip PROM all planes THERAPEUTIC EX  Bike 10'  ga

## 2020-12-17 ENCOUNTER — OFFICE VISIT (OUTPATIENT)
Dept: PHYSICAL THERAPY | Age: 60
End: 2020-12-17
Attending: ORTHOPAEDIC SURGERY
Payer: COMMERCIAL

## 2020-12-17 PROCEDURE — 97110 THERAPEUTIC EXERCISES: CPT

## 2020-12-17 NOTE — PROGRESS NOTES
Dx: L THR revision 11/2/20         Insurance (Authorized # of Visits):   O  15  Authorizing Physician: Dr. Jatinder Sun Next MD visit: none scheduled  Fall Risk: standard         Precautions: toe touch weight bearing, no active hip abd            Subjective: FW 11/30/2020                TX#: 5/12 Date: 12/3/2020  Tx#: 6/12 Date: 12/8/2020  Tx#: 7/12 Date: 12/10/2020  Tx#: 8/12 Date: 12/15/2020  Tx#: 9/12 Date: 12/17/2020  Tx#: 10/12   THERAPEUTIC EX  Bike 10'  gastroc stretch 4x30\"  Hamstring stretch 4x30\"  Hip min

## 2020-12-18 ENCOUNTER — TELEPHONE (OUTPATIENT)
Dept: PHYSICAL THERAPY | Facility: HOSPITAL | Age: 60
End: 2020-12-18

## 2020-12-18 ENCOUNTER — TELEPHONE (OUTPATIENT)
Dept: FAMILY MEDICINE CLINIC | Facility: CLINIC | Age: 60
End: 2020-12-18

## 2020-12-18 ENCOUNTER — ORDER TRANSCRIPTION (OUTPATIENT)
Dept: PHYSICAL THERAPY | Facility: HOSPITAL | Age: 60
End: 2020-12-18

## 2020-12-18 DIAGNOSIS — Z96.642 STATUS POST LEFT HIP REPLACEMENT: Primary | ICD-10-CM

## 2020-12-18 NOTE — TELEPHONE ENCOUNTER
Larry Beltrán from [facility] called requesting a referral be sent to THE [facility] Referrals for therapy due to patient is HMO and has been receiving therapy with no prior auth. Referral sent as requested, 12/18.     Please contact Larry Beltrán - [phone] -

## 2020-12-21 ENCOUNTER — OFFICE VISIT (OUTPATIENT)
Dept: PHYSICAL THERAPY | Age: 60
End: 2020-12-21
Attending: ORTHOPAEDIC SURGERY
Payer: COMMERCIAL

## 2020-12-21 PROCEDURE — 97110 THERAPEUTIC EXERCISES: CPT

## 2020-12-21 NOTE — PROGRESS NOTES
Dx: L THR revision 11/2/20         Insurance (Authorized # of Visits):   O  15  Authorizing Physician: Dr. Christa Simeon Next MD visit: none scheduled  Fall Risk: standard         Precautions: toe touch weight bearing, no active hip abd            Subjective: Gl PT-Met        Plan: Progress strength and proprioception  Date: 12/3/2020  Tx#: 6/12 Date: 12/8/2020  Tx#: 7/12 Date: 12/10/2020  Tx#: 8/12 Date: 12/15/2020  Tx#: 9/12 Date: 12/17/2020  Tx#: 10/12 Date: 12/21/2020  Tx#: 11/12   THERAPEUTIC EX  Bike 10'  ga saq,ankle pumps, stand ham curl, TA bracing    Charges: therapeutic ex 3       Total Timed Treatment: 45 min  Total Treatment Time: 45 min

## 2020-12-22 ENCOUNTER — OFFICE VISIT (OUTPATIENT)
Dept: PHYSICAL THERAPY | Age: 60
End: 2020-12-22
Attending: ORTHOPAEDIC SURGERY
Payer: COMMERCIAL

## 2020-12-22 PROCEDURE — 97110 THERAPEUTIC EXERCISES: CPT

## 2020-12-22 NOTE — PROGRESS NOTES
Dx: L THR revision 11/2/20         Insurance (Authorized # of Visits):   O  15  Authorizing Physician: Dr. Isaias Jameson Next MD visit: none scheduled  Fall Risk: standard         Precautions: toe touch weight bearing, no active hip abd            Subjective: Hi strength and proprioception  Date: 12/8/2020  Tx#: 7/12 Date: 12/10/2020  Tx#: 8/12 Date: 12/15/2020  Tx#: 9/12 Date: 12/17/2020  Tx#: 10/12 Date: 12/21/2020  Tx#: 11/12 Date: 12/22/2020  Tx#: 12/12   THERAPEUTIC EX  Bike 10'  gastroc stretch 4x30\"  Hamst 3x10 fwd/bwd 3x10  SLS on pad 10\"x6  Shuttle squat with balance board 50 3x10                   HEP: bridge, saq,ankle pumps, stand ham curl, TA bracing    Charges: therapeutic ex 3       Total Timed Treatment: 45 min  Total Treatment Time: 45 min

## 2020-12-23 ENCOUNTER — HOSPITAL ENCOUNTER (OUTPATIENT)
Dept: MAMMOGRAPHY | Age: 60
Discharge: HOME | End: 2020-12-23
Attending: FAMILY MEDICINE
Payer: COMMERCIAL

## 2020-12-23 DIAGNOSIS — Z12.31 VISIT FOR SCREENING MAMMOGRAM: ICD-10-CM

## 2020-12-23 PROCEDURE — 77067 SCR MAMMO BI INCL CAD: CPT | Performed by: FAMILY MEDICINE

## 2020-12-24 ENCOUNTER — TELEPHONE (OUTPATIENT)
Dept: FAMILY MEDICINE CLINIC | Facility: CLINIC | Age: 60
End: 2020-12-24

## 2020-12-24 NOTE — TELEPHONE ENCOUNTER
Patient notified and verbalizes understanding. Patient also confirmed appointment for physical and pap on 1/26/21 and added that she needs her shingles vaccine.

## 2020-12-24 NOTE — TELEPHONE ENCOUNTER
----- Message from CHAVA Strange sent at 12/23/2020  4:59 PM CST -----  Dr. Ria Deutsch is out of the office today. Mammogram is normal. Repeat in 12 months per radiology Note to Rickey.

## 2020-12-29 ENCOUNTER — TELEPHONE (OUTPATIENT)
Dept: PHYSICAL THERAPY | Facility: HOSPITAL | Age: 60
End: 2020-12-29

## 2020-12-29 ENCOUNTER — OFFICE VISIT (OUTPATIENT)
Dept: PHYSICAL THERAPY | Age: 60
End: 2020-12-29
Attending: PHYSICIAN ASSISTANT
Payer: COMMERCIAL

## 2020-12-29 PROCEDURE — 97110 THERAPEUTIC EXERCISES: CPT

## 2020-12-29 NOTE — PROGRESS NOTES
Dx: L THR revision 11/2/20         Insurance (Authorized # of Visits):   O  15  Authorizing Physician: Dr. Christa Simeon Next MD visit: none scheduled  Fall Risk: standard         Precautions: toe touch weight bearing, no active hip abd            Subjective: Hi and proprioception  Date: 12/8/2020  Tx#: 7/12 Date: 12/10/2020  Tx#: 8/12 Date: 12/15/2020  Tx#: 9/12 Date: 12/17/2020  Tx#: 10/12 Date: 12/21/2020  Tx#: 11/12 Date: 12/22/2020  Tx#: 12/12 Date: 12/29/2020  Tx#: 13/12   THERAPEUTIC EX  Bike 10'  gastroc s 3x10  Hip PROM all planes  Clamshells 3x10  Side stepping yellow 25'x4  Step over 4\" 3x10  Lateral step up 4\" 3x10  Hip hikes 4\" 2x10 B      NEURO RE-ED  Tiltboard lateral L1 3x10  Shuttle squat with balance board 50 3x10 NEURO RE-ED  Tiltboard lateral

## 2021-01-15 ENCOUNTER — TELEPHONE (OUTPATIENT)
Dept: FAMILY MEDICINE CLINIC | Facility: CLINIC | Age: 61
End: 2021-01-15

## 2021-01-15 DIAGNOSIS — G47.33 OSA (OBSTRUCTIVE SLEEP APNEA): Primary | ICD-10-CM

## 2021-01-15 NOTE — TELEPHONE ENCOUNTER
Call from [address] [place] [address] for new dme referral.   Pt with [facility]. Staff message sent to REferral department.

## 2021-01-25 ENCOUNTER — OFFICE VISIT (OUTPATIENT)
Dept: PHYSICAL THERAPY | Age: 61
End: 2021-01-25
Attending: PHYSICIAN ASSISTANT
Payer: COMMERCIAL

## 2021-01-25 PROBLEM — R73.03 PREDIABETES: Status: ACTIVE | Noted: 2019-10-01

## 2021-01-25 PROBLEM — E66.9 OBESITY: Status: ACTIVE | Noted: 2019-08-19

## 2021-01-25 PROBLEM — E88.810 METABOLIC SYNDROME X: Status: ACTIVE | Noted: 2019-08-19

## 2021-01-25 PROBLEM — E78.5 HYPERLIPIDEMIA: Status: ACTIVE | Noted: 2021-01-25

## 2021-01-25 PROBLEM — Z96.649 HISTORY OF HIP REPLACEMENT: Status: ACTIVE | Noted: 2019-04-17

## 2021-01-25 PROBLEM — E88.81 METABOLIC SYNDROME X: Status: ACTIVE | Noted: 2019-08-19

## 2021-01-25 PROCEDURE — 97110 THERAPEUTIC EXERCISES: CPT

## 2021-01-25 NOTE — PROGRESS NOTES
Dx: L THR revision 11/2/20         Insurance (Authorized # of Visits): O  15  Authorizing Physician: Dr. Julianna Silver Next MD visit: none scheduled  Fall Risk: standard         Precautions: No precautions           Subjective: Did HEP while in [place].  Some latera strength and proprioception  Date: 12/10/2020  Tx#: 8/12 Date: 12/15/2020  Tx#: 9/12 Date: 12/17/2020  Tx#: 10/12 Date: 12/21/2020  Tx#: 11/12 Date: 12/22/2020  Tx#: 12/12 Date: 12/29/2020  Tx#: 1/8 Date: 1/25/2021  Tx#: 2/8   THERAPEUTIC EX  Bike 10'  gas 4x30\"  slant board stretch 3x1' L3  Single leg bridge 3x10  Hip PROM all planes  Clamshells MR 3x10  Side stepping FM 13# 10ea  Step over 6\" 3x10  Lateral step up 6\" 3x10  Hip hikes 4\" 2x10 B     NEURO RE-ED  Tiltboard lateral L1 3x10  Shuttle squat wi

## 2021-01-26 ENCOUNTER — TELEPHONE (OUTPATIENT)
Dept: PHYSICAL THERAPY | Facility: HOSPITAL | Age: 61
End: 2021-01-26

## 2021-01-26 ENCOUNTER — APPOINTMENT (OUTPATIENT)
Dept: PHYSICAL THERAPY | Age: 61
End: 2021-01-26
Attending: PHYSICIAN ASSISTANT
Payer: COMMERCIAL

## 2021-01-26 ENCOUNTER — TELEPHONE (OUTPATIENT)
Dept: FAMILY MEDICINE CLINIC | Facility: CLINIC | Age: 61
End: 2021-01-26

## 2021-01-26 ENCOUNTER — OFFICE VISIT (OUTPATIENT)
Dept: FAMILY MEDICINE CLINIC | Facility: CLINIC | Age: 61
End: 2021-01-26

## 2021-01-26 VITALS
SYSTOLIC BLOOD PRESSURE: 118 MMHG | TEMPERATURE: 99 F | WEIGHT: 222.63 LBS | HEIGHT: 65 IN | RESPIRATION RATE: 16 BRPM | OXYGEN SATURATION: 95 % | DIASTOLIC BLOOD PRESSURE: 72 MMHG | BODY MASS INDEX: 37.09 KG/M2 | HEART RATE: 80 BPM

## 2021-01-26 DIAGNOSIS — Z01.419 ENCOUNTER FOR WELL WOMAN EXAM: ICD-10-CM

## 2021-01-26 DIAGNOSIS — E53.8 VITAMIN B12 DEFICIENCY: ICD-10-CM

## 2021-01-26 DIAGNOSIS — R74.01 ELEVATION OF LEVEL OF TRANSAMINASE AND LACTIC ACID DEHYDROGENASE (LDH): ICD-10-CM

## 2021-01-26 DIAGNOSIS — E04.1 THYROID NODULE: ICD-10-CM

## 2021-01-26 DIAGNOSIS — R74.02 ELEVATION OF LEVEL OF TRANSAMINASE AND LACTIC ACID DEHYDROGENASE (LDH): ICD-10-CM

## 2021-01-26 DIAGNOSIS — E78.2 MIXED HYPERLIPIDEMIA: ICD-10-CM

## 2021-01-26 DIAGNOSIS — Z00.00 ENCOUNTER FOR GENERAL ADULT MEDICAL EXAMINATION W/O ABNORMAL FINDINGS: ICD-10-CM

## 2021-01-26 DIAGNOSIS — Z00.00 WELLNESS EXAMINATION: Primary | ICD-10-CM

## 2021-01-26 PROCEDURE — 3078F DIAST BP <80 MM HG: CPT | Performed by: FAMILY MEDICINE

## 2021-01-26 PROCEDURE — 3074F SYST BP LT 130 MM HG: CPT | Performed by: FAMILY MEDICINE

## 2021-01-26 PROCEDURE — 99396 PREV VISIT EST AGE 40-64: CPT | Performed by: FAMILY MEDICINE

## 2021-01-26 PROCEDURE — 90471 IMMUNIZATION ADMIN: CPT | Performed by: FAMILY MEDICINE

## 2021-01-26 PROCEDURE — 90750 HZV VACC RECOMBINANT IM: CPT | Performed by: FAMILY MEDICINE

## 2021-01-26 PROCEDURE — 3008F BODY MASS INDEX DOCD: CPT | Performed by: FAMILY MEDICINE

## 2021-01-26 NOTE — PROGRESS NOTES
HPI:   Santiago Rizzo is a 61year old female who presents for an Annual Health Visit. She is overall doing well. Much better status post hip replacement. Now on second hips both side.        Allergies:   No Active Allergies    CURRENT MEDICA TONSILLECTOMY     • TOTAL HIP REPLACEMENT Right     @[facility]   • TOTAL HIP REPLACEMENT Right 05/16/2018    revision   • TOTAL HIP REPLACEMENT Left       Family History   Problem Relation Age of Onset   • Stroke Father    • Diabetes Father    • Moni SYSTEMS:     Review of Systems   Constitutional: Negative. Negative for activity change. HENT: Negative. Eyes: Negative. Respiratory: Negative. Cardiovascular: Negative. Gastrointestinal: Negative. Endocrine: Negative.     Genitourinary: N Kenny La was seen today for physical.    Diagnoses and all orders for this visit:    Wellness examination    Encounter for well woman exam  -     BREAST AND PELVIC EXAM    Encounter for general adult medical examination w/o abnormal findings    Elevation of Osteoarthritis of hip     Thyroid nodule     Tubular adenoma of colon     Failed total hip arthroplasty ([facility] [address]     History of hip replacement     Hyperlipidemia     Metabolic syndrome X     Obesity     Prediabetes      Ovidio Hanley MD  1/26/2021  9:38 AM

## 2021-01-27 ENCOUNTER — APPOINTMENT (OUTPATIENT)
Dept: PHYSICAL THERAPY | Age: 61
End: 2021-01-27
Payer: COMMERCIAL

## 2021-01-28 ENCOUNTER — APPOINTMENT (OUTPATIENT)
Dept: PHYSICAL THERAPY | Age: 61
End: 2021-01-28
Attending: PHYSICIAN ASSISTANT
Payer: COMMERCIAL

## 2021-01-29 ENCOUNTER — APPOINTMENT (OUTPATIENT)
Dept: PHYSICAL THERAPY | Age: 61
End: 2021-01-29
Attending: PHYSICIAN ASSISTANT
Payer: COMMERCIAL

## 2021-02-02 ENCOUNTER — APPOINTMENT (OUTPATIENT)
Dept: PHYSICAL THERAPY | Age: 61
End: 2021-02-02
Attending: PHYSICIAN ASSISTANT
Payer: COMMERCIAL

## 2021-02-05 ENCOUNTER — TELEPHONE (OUTPATIENT)
Dept: PHYSICAL THERAPY | Facility: HOSPITAL | Age: 61
End: 2021-02-05

## 2021-02-05 ENCOUNTER — APPOINTMENT (OUTPATIENT)
Dept: PHYSICAL THERAPY | Age: 61
End: 2021-02-05
Attending: PHYSICIAN ASSISTANT
Payer: COMMERCIAL

## 2021-02-11 ENCOUNTER — TELEPHONE (OUTPATIENT)
Dept: FAMILY MEDICINE CLINIC | Facility: CLINIC | Age: 61
End: 2021-02-11

## 2021-02-11 DIAGNOSIS — Z96.642 STATUS POST LEFT HIP REPLACEMENT: Primary | ICD-10-CM

## 2021-02-11 NOTE — TELEPHONE ENCOUNTER
fax [phone]. pt is at [address] . wants to get cortizone inj in knee.  wants referrel sent

## 2021-02-11 NOTE — TELEPHONE ENCOUNTER
Typically the referral occurs before the appointment. Her HMO may not ok it regardless as it was not authorized. Added order but not sure if he has the right office as not enough information was given.

## 2021-02-11 NOTE — TELEPHONE ENCOUNTER
Patient is currently at the orthopedist office to get injection to knee, is requesting a referral to ortho now. Was last seen in office 1/26/21 for physical.    Please advise.

## 2021-02-12 ENCOUNTER — OFFICE VISIT (OUTPATIENT)
Dept: PHYSICAL THERAPY | Age: 61
End: 2021-02-12
Attending: PHYSICIAN ASSISTANT
Payer: COMMERCIAL

## 2021-02-12 PROCEDURE — 97110 THERAPEUTIC EXERCISES: CPT

## 2021-02-12 NOTE — PROGRESS NOTES
Dx: L THR revision 11/2/20         Insurance (Authorized # of Visits): O  15  Authorizing Physician: Dr. Julianna Silver Next MD visit: none scheduled  Fall Risk: standard         Precautions: No precautions           Subjective: Progressing well with HEP.  General Sanz Date: 12/17/2020  Tx#: 10/12 Date: 12/21/2020  Tx#: 11/12 Date: 12/22/2020  Tx#: 12/12 Date: 12/29/2020  Tx#: 1/8 Date: 1/25/2021  Tx#: 2/8 Date: 2/12/2021  Tx#: 3/8   THERAPEUTIC EX  Bike 10'  gastroc stretch 4x30\"  Hamstring stretch 4x30\"  Hip add stre 4x30\"  slant board stretch 3x1' L3  Shuttle squats 68 3x10  Single leg bridge 3x10  Hip PROM all planes  Clamshells MR 3x10  Side stepping FM 13# 10ea  Step over 6\" 3x10  Lateral step up 6\" 3x10  Hip hikes 4\" 2x10 B    NEURO RE-ED  Tiltboard lateral L1

## 2021-02-16 ENCOUNTER — OFFICE VISIT (OUTPATIENT)
Dept: PHYSICAL THERAPY | Age: 61
End: 2021-02-16
Attending: PHYSICIAN ASSISTANT
Payer: COMMERCIAL

## 2021-02-16 PROCEDURE — 97110 THERAPEUTIC EXERCISES: CPT

## 2021-02-16 NOTE — PROGRESS NOTES
Discharge Summary  Pt has attended 16 visits in Physical Therapy. Dx: L THR revision 11/2/20         Insurance (Authorized # of Visits):   O  15  Authorizing Physician: Dr. Jatinder Sun Next MD visit: none scheduled  Fall Risk: standard         Precautions: 12/22/2020  Tx#: 12/12 Date: 12/29/2020  Tx#: 1/8 Date: 1/25/2021  Tx#: 2/8 Date: 2/12/2021  Tx#: 3/8 Date: 2/16/2021  Tx#: 4/8   THERAPEUTIC EX  Bike 10'  gastroc stretch 4x30\"  Hamstring stretch 4x30\"  Hip add stretch 4x30\"  SL rectus/IP stretch 4x30\ stretch 4x30\"  SL rectus/IP stretch 4x30\"  slant board stretch 3x1' L3  Shuttle squats 75 3x10  Single leg bridge 3x10  Hip PROM all planes  Clamshells MR 3x10  Side stepping FM 13# 10ea  Step over 6\" 3x10  Lateral step up 6\" 3x10  Hip hikes 4\" 2x10 B

## 2021-02-18 ENCOUNTER — TELEPHONE (OUTPATIENT)
Dept: FAMILY MEDICINE CLINIC | Facility: CLINIC | Age: 61
End: 2021-02-18

## 2021-02-18 NOTE — TELEPHONE ENCOUNTER
Patient called [facility] as advised and was told her [de-identified] office would know names of in-network orthopedic physicians. The plan website indicates there are no orthopedic providers within 100 miles of patient's zip code.     Message sent to

## 2021-02-18 NOTE — TELEPHONE ENCOUNTER
getting a huge runaround calling offices RE: referrals        please call her back       Future Appointments   Date Time Provider Katie Poe   3/30/2021  8:15 AM [facility] REF [facility]   6/29/2021  8:45 AM [facility] NURSE [facility]

## 2021-03-01 ENCOUNTER — TELEPHONE (OUTPATIENT)
Dept: FAMILY MEDICINE CLINIC | Facility: CLINIC | Age: 61
End: 2021-03-01

## 2021-03-01 DIAGNOSIS — M16.0 BILATERAL HIP JOINT ARTHRITIS: Primary | ICD-10-CM

## 2021-03-01 NOTE — TELEPHONE ENCOUNTER
Patient will call office of Dr. Benson Luis and [address] to check insurance and will report back. Was unable to get anyone to  a phone line at [facility].

## 2021-03-01 NOTE — TELEPHONE ENCOUNTER
Patient is no longer able to see her orthopedic in [place] due to insurance change. Her [employer] HMO does cover Dr. Tanja Logan. Please advise whether this would be an appropriate referral to replace the one from 2/11/21.

## 2021-03-05 ENCOUNTER — TELEPHONE (OUTPATIENT)
Dept: ORTHOPEDICS CLINIC | Facility: CLINIC | Age: 61
End: 2021-03-05

## 2021-03-05 DIAGNOSIS — M25.562 LEFT KNEE PAIN, UNSPECIFIED CHRONICITY: Primary | ICD-10-CM

## 2021-03-05 DIAGNOSIS — M25.552 BILATERAL HIP PAIN: ICD-10-CM

## 2021-03-05 DIAGNOSIS — M25.551 BILATERAL HIP PAIN: ICD-10-CM

## 2021-03-05 NOTE — TELEPHONE ENCOUNTER
Pt called and states she called [facility] ( [facility] [address]) they stated they are not able to check and see if she is in network. She called her insurance company and they stated they need to hear from our office.  Pt has [facility] and must see one of

## 2021-03-05 NOTE — TELEPHONE ENCOUNTER
Can we try referring to one of the [facility] orthopedic surgeons? I found Dr. Ehsan Feldman, Dr. Cira Alves, Dr. Yue Wilcox, and Dr. Ingrid Hammans.

## 2021-03-05 NOTE — TELEPHONE ENCOUNTER
Confirmed with this orthopedic office that they do take [facility]. Patient notified and will schedule appointment.

## 2021-03-08 ENCOUNTER — OFFICE VISIT (OUTPATIENT)
Dept: ORTHOPEDICS CLINIC | Facility: CLINIC | Age: 61
End: 2021-03-08

## 2021-03-08 ENCOUNTER — HOSPITAL ENCOUNTER (OUTPATIENT)
Dept: GENERAL RADIOLOGY | Age: 61
Discharge: HOME | End: 2021-03-08
Attending: ORTHOPAEDIC SURGERY
Payer: COMMERCIAL

## 2021-03-08 VITALS — OXYGEN SATURATION: 98 % | HEART RATE: 85 BPM | BODY MASS INDEX: 36.45 KG/M2 | HEIGHT: 66 IN | WEIGHT: 226.81 LBS

## 2021-03-08 DIAGNOSIS — M17.10 PATELLOFEMORAL ARTHRITIS: ICD-10-CM

## 2021-03-08 DIAGNOSIS — M17.12 PRIMARY OSTEOARTHRITIS OF LEFT KNEE: Primary | ICD-10-CM

## 2021-03-08 DIAGNOSIS — M25.562 LEFT KNEE PAIN, UNSPECIFIED CHRONICITY: ICD-10-CM

## 2021-03-08 PROCEDURE — 99203 OFFICE O/P NEW LOW 30 MIN: CPT | Performed by: ORTHOPAEDIC SURGERY

## 2021-03-08 PROCEDURE — 20610 DRAIN/INJ JOINT/BURSA W/O US: CPT | Performed by: ORTHOPAEDIC SURGERY

## 2021-03-08 PROCEDURE — 3008F BODY MASS INDEX DOCD: CPT | Performed by: ORTHOPAEDIC SURGERY

## 2021-03-08 PROCEDURE — 73564 X-RAY EXAM KNEE 4 OR MORE: CPT | Performed by: ORTHOPAEDIC SURGERY

## 2021-03-08 RX ORDER — TRIAMCINOLONE ACETONIDE 40 MG/ML
40 INJECTION, SUSPENSION INTRA-ARTICULAR; INTRAMUSCULAR ONCE
Status: COMPLETED | OUTPATIENT
Start: 2021-03-08 | End: 2021-03-08

## 2021-03-08 RX ADMIN — TRIAMCINOLONE ACETONIDE 40 MG: 40 INJECTION, SUSPENSION INTRA-ARTICULAR; INTRAMUSCULAR at 15:53:00

## 2021-03-08 NOTE — H&P
[facility] New Patient Note    CC: Patient presents with:  Knee Pain: left knee pain       HPI: This 61year old female presents today with complaints of left knee pain.   Patient reports that she has had pain in the knee for years secondary to arthri RIGHT Right    • OTHER SURGICAL HISTORY Left 2017    Hammer Toe fixed    • REDUCTION LEFT     • TONSILLECTOMY     • TOTAL HIP REPLACEMENT Right     @[facility]   • TOTAL HIP REPLACEMENT Right 05/16/2018    revision   • TOTAL HIP REPLACEMENT Left conversational  Psychological: Appropriate affect. Respiratory: Unlabored breathing.   Stance: No coronal plane deformity to the knee  Left lower extremity:  • Inspection: skin intact, no lesions or effusion  • Palpation: Nontender to palpation medial late what exercises are best for arthritis, and she will continue these on her own as well as with therapist that she is working with following her left hip revision.   She has had excellent relief in the past from steroid injection and would like to repeat this

## 2021-03-08 NOTE — PATIENT INSTRUCTIONS
What Is Arthritis? Arthritis is a disease that affects the joints. Joints are the parts where bones meet and move. It can affect any joint in your body. There are many types of arthritis.  They include:   · Osteoarthritis  · Rheumatoid arthritis  · Gout  · The joint's range of motion can become limited. Symptoms  Arthritis can affect any joint. Weight-bearing joints, such as the hips and knees, are often affected. Common symptoms are joint pain and stiffness.  Pain and stiffness may get worse with inactivi pounds. Losing one single pound takes multiple pounds of pressure off the joints. Losing 10 pounds can take 50 pounds of pressure off the joints. The tips below may help:  · Start a weight-loss program with the help of your healthcare provider.   · Ask your exercise part of your life. Talk with your healthcare provider about what is safe for you. Make sure you:  · Choose exercises that improve joint motion and make your muscles stronger. Learn how to do exercises correctly and safely.  Consider talking with a Isometric exercises are done by tightening the muscles without moving the joint. This may be a good way to strengthen the muscles around a stiff joint. · Avoid deep flexion or deep squatting (do not bend the knee more than 90 degrees).   · Focus on closed- flexibility activities such as yoga and kolton chi may improve pain and joint motion.  You might try:  · Yoga, including chair yoga, helps to keep your joints strong and flexible  · Kolton Chi, an ancient type of exercise with slow, gentle movements  · Water exer provider to help reduce your pain and improve joint mobility.  Treatments may include:  · Topical medicines such as lidocaine, capsaicin, and diclofenac gel  · Oral medicines such as acetaminophen or NSAIDs (nonsteroidal anti-inflammatory drugs) such as ibu ceramic, or plastic. This is most often done with the knee or hip joint. · Other surgery. There are other surgical procedures specific to certain joints.       Get help and more information  · For more information on arthritis go to the Arthritis [facility] CHEST PAIN

## 2021-03-11 ENCOUNTER — TELEPHONE (OUTPATIENT)
Dept: FAMILY MEDICINE CLINIC | Facility: CLINIC | Age: 61
End: 2021-03-11

## 2021-03-11 ENCOUNTER — OFFICE VISIT (OUTPATIENT)
Dept: FAMILY MEDICINE CLINIC | Facility: CLINIC | Age: 61
End: 2021-03-11

## 2021-03-11 VITALS
TEMPERATURE: 99 F | HEART RATE: 76 BPM | SYSTOLIC BLOOD PRESSURE: 128 MMHG | WEIGHT: 226.19 LBS | DIASTOLIC BLOOD PRESSURE: 80 MMHG | RESPIRATION RATE: 18 BRPM | BODY MASS INDEX: 36.35 KG/M2 | OXYGEN SATURATION: 95 % | HEIGHT: 66 IN

## 2021-03-11 DIAGNOSIS — H93.13 TINNITUS OF BOTH EARS: Primary | ICD-10-CM

## 2021-03-11 PROCEDURE — 3074F SYST BP LT 130 MM HG: CPT | Performed by: FAMILY MEDICINE

## 2021-03-11 PROCEDURE — 3008F BODY MASS INDEX DOCD: CPT | Performed by: FAMILY MEDICINE

## 2021-03-11 PROCEDURE — 3079F DIAST BP 80-89 MM HG: CPT | Performed by: FAMILY MEDICINE

## 2021-03-11 PROCEDURE — 99214 OFFICE O/P EST MOD 30 MIN: CPT | Performed by: FAMILY MEDICINE

## 2021-03-11 RX ORDER — FLUTICASONE PROPIONATE 50 MCG
2 SPRAY, SUSPENSION (ML) NASAL DAILY
Qty: 3 BOTTLE | Refills: 3 | Status: SHIPPED | OUTPATIENT
Start: 2021-03-11 | End: 2022-03-06

## 2021-03-11 RX ORDER — AMOXICILLIN 500 MG/1
TABLET, FILM COATED ORAL
COMMUNITY
Start: 2021-01-27 | End: 2021-08-23

## 2021-03-11 RX ORDER — UBIDECARENONE/VITAMIN E MIXED 100 MG-100
CAPSULE ORAL
COMMUNITY
Start: 2021-02-01

## 2021-03-11 NOTE — PROGRESS NOTES
[facility] [place]  PROGRESS NOTE        HPI:   This is a 61year old female coming in for Patient presents with:  Ear Problem: humming started about 3 or 4 weeks ago    HPI  She noticed it when she is in tbed and put her cpap on, and she wasn't Provider:  Alina Murillo MD          Collected:           11/02/2020 12:47 PM          Ordering Location:     [facility]          Received:            11/02/2020 02:46 PM                                 Operating Room \"Vijaya, left hip explanted hardware\" and consists of two pieces of hip hardware. The first piece is a metallic ball joint measuring 3.2 x 3.2 x 2.6 cm. Inscriptions: 32 mm [id number] 12/14 19.32.06.  The second piece is a metallic dome-shaped metallic join REPLACEMENT Right     @[employer]   • TOTAL HIP REPLACEMENT Right 05/16/2018    revision   • TOTAL HIP REPLACEMENT Left      Social History:  Social History    Social History Narrative      Father is alive. Mother is alive.        The patient indicate Active Allergies  Current Meds:  Current Outpatient Medications   Medication Sig Dispense Refill   • amoxicillin 500 MG Oral Tab TAKE FOUR TS PO 1 HOUR B DAPP     • Coenzyme Q10 (COQ10) 100 MG Oral Cap      • Fluticasone Propionate 50 MCG/ACT Nasal Suspens HENT: Positive for tinnitus. Eyes: Negative. Respiratory: Negative. Cardiovascular: Negative. Gastrointestinal: Negative. Endocrine: Negative. Genitourinary: Negative.     Musculoskeletal: Positive for arthralgias (had injection and doin sounds: Normal breath sounds. Abdominal:      General: Bowel sounds are normal. There is no distension. Palpations: Abdomen is soft. Musculoskeletal:         General: Normal range of motion.       Cervical back: Normal range of motion and neck supp Prescriptions     Signed Prescriptions Disp Refills   • Fluticasone Propionate 50 MCG/ACT Nasal Suspension 3 Bottle 3     Si sprays by Each Nare route daily. Outcome: Parent verbalizes understanding.  Parent is notified to call with any questions,

## 2021-03-19 ENCOUNTER — TELEPHONE (OUTPATIENT)
Dept: FAMILY MEDICINE CLINIC | Facility: CLINIC | Age: 61
End: 2021-03-19

## 2021-03-19 NOTE — TELEPHONE ENCOUNTER
ENT office is currently closed. Will try again Monday. [facility] is through [facility]. The HMO seems to cover providers within this network.  Patient's HMO has been extremely difficult to navigate as far as identifying in-n

## 2021-03-19 NOTE — TELEPHONE ENCOUNTER
Patient is not having covid symptoms. Patient is having ongoing tinnitus. Has seen ENT she was referred to and has been on a course of Prednisone with no change in the tinnitus.      Patient is requesting a referral to a chiropractor to try and help her wit

## 2021-03-19 NOTE — TELEPHONE ENCOUNTER
Dr. Damian Goddard out of the office. Consult placed for ENT on 03/11/21, can we get copy of that consult?     Patient would also need to check with her insurance in terms of specific names of in network providers for chiropractic and present to Dr. Damian Goddard, I am unsur

## 2021-03-24 ENCOUNTER — TELEPHONE (OUTPATIENT)
Dept: FAMILY MEDICINE CLINIC | Facility: CLINIC | Age: 61
End: 2021-03-24

## 2021-03-24 NOTE — TELEPHONE ENCOUNTER
OTW until 3/25.   Patient states she would like a call back from Sylvia Key regarding status of chiropractor [facility] referral. No further needs at this time

## 2021-03-25 NOTE — TELEPHONE ENCOUNTER
Patient will inquire regarding services at [facility]. tenKsolar message sent with that phone number for her. Patient has not been to see ENT. She is pretty set on seeing chiropractor at this time.

## 2021-03-29 ENCOUNTER — LAB ENCOUNTER (OUTPATIENT)
Dept: LAB | Age: 61
End: 2021-03-29
Attending: FAMILY MEDICINE
Payer: COMMERCIAL

## 2021-03-29 ENCOUNTER — TELEPHONE (OUTPATIENT)
Dept: FAMILY MEDICINE CLINIC | Facility: CLINIC | Age: 61
End: 2021-03-29

## 2021-03-29 DIAGNOSIS — E78.2 MIXED HYPERLIPIDEMIA: ICD-10-CM

## 2021-03-29 DIAGNOSIS — E04.1 THYROID NODULE: ICD-10-CM

## 2021-03-29 DIAGNOSIS — R31.9 HEMATURIA, UNSPECIFIED TYPE: Primary | ICD-10-CM

## 2021-03-29 DIAGNOSIS — E53.8 VITAMIN B12 DEFICIENCY: ICD-10-CM

## 2021-03-29 DIAGNOSIS — R74.01 ELEVATION OF LEVEL OF TRANSAMINASE AND LACTIC ACID DEHYDROGENASE (LDH): ICD-10-CM

## 2021-03-29 DIAGNOSIS — R74.02 ELEVATION OF LEVEL OF TRANSAMINASE AND LACTIC ACID DEHYDROGENASE (LDH): ICD-10-CM

## 2021-03-29 LAB
ALBUMIN SERPL-MCNC: 3.7 G/DL (ref 3.4–5)
ALBUMIN/GLOB SERPL: 0.9 {RATIO} (ref 1–2)
ALP LIVER SERPL-CCNC: 101 U/L
ALT SERPL-CCNC: 30 U/L
ANION GAP SERPL CALC-SCNC: 3 MMOL/L (ref 0–18)
AST SERPL-CCNC: 22 U/L (ref 15–37)
BASOPHILS # BLD AUTO: 0.03 X10(3) UL (ref 0–0.2)
BASOPHILS NFR BLD AUTO: 0.4 %
BILIRUB SERPL-MCNC: 0.5 MG/DL (ref 0.1–2)
BILIRUB UR QL STRIP.AUTO: NEGATIVE
BUN BLD-MCNC: 21 MG/DL (ref 7–18)
BUN/CREAT SERPL: 24.1 (ref 10–20)
CALCIUM BLD-MCNC: 9.3 MG/DL (ref 8.5–10.1)
CHLORIDE SERPL-SCNC: 109 MMOL/L (ref 98–112)
CHOLEST SMN-MCNC: 181 MG/DL (ref ?–200)
CK SERPL-CCNC: 104 U/L
CO2 SERPL-SCNC: 28 MMOL/L (ref 21–32)
COLOR UR AUTO: YELLOW
CREAT BLD-MCNC: 0.87 MG/DL
DEPRECATED RDW RBC AUTO: 46.8 FL (ref 35.1–46.3)
EOSINOPHIL # BLD AUTO: 0.23 X10(3) UL (ref 0–0.7)
EOSINOPHIL NFR BLD AUTO: 3 %
ERYTHROCYTE [DISTWIDTH] IN BLOOD BY AUTOMATED COUNT: 14.1 % (ref 11–15)
GLOBULIN PLAS-MCNC: 3.9 G/DL (ref 2.8–4.4)
GLUCOSE BLD-MCNC: 85 MG/DL (ref 70–99)
GLUCOSE UR STRIP.AUTO-MCNC: NEGATIVE MG/DL
HCT VFR BLD AUTO: 47.4 %
HDLC SERPL-MCNC: 72 MG/DL (ref 40–59)
HGB BLD-MCNC: 14.9 G/DL
IMM GRANULOCYTES # BLD AUTO: 0.03 X10(3) UL (ref 0–1)
IMM GRANULOCYTES NFR BLD: 0.4 %
KETONES UR STRIP.AUTO-MCNC: NEGATIVE MG/DL
LDLC SERPL CALC-MCNC: 90 MG/DL (ref ?–100)
LYMPHOCYTES # BLD AUTO: 2.29 X10(3) UL (ref 1–4)
LYMPHOCYTES NFR BLD AUTO: 30.2 %
M PROTEIN MFR SERPL ELPH: 7.6 G/DL (ref 6.4–8.2)
MCH RBC QN AUTO: 28.7 PG (ref 26–34)
MCHC RBC AUTO-ENTMCNC: 31.4 G/DL (ref 31–37)
MCV RBC AUTO: 91.3 FL
MONOCYTES # BLD AUTO: 0.47 X10(3) UL (ref 0.1–1)
MONOCYTES NFR BLD AUTO: 6.2 %
NEUTROPHILS # BLD AUTO: 4.54 X10 (3) UL (ref 1.5–7.7)
NEUTROPHILS # BLD AUTO: 4.54 X10(3) UL (ref 1.5–7.7)
NEUTROPHILS NFR BLD AUTO: 59.8 %
NITRITE UR QL STRIP.AUTO: NEGATIVE
NONHDLC SERPL-MCNC: 109 MG/DL (ref ?–130)
OSMOLALITY SERPL CALC.SUM OF ELEC: 292 MOSM/KG (ref 275–295)
PATIENT FASTING Y/N/NP: YES
PATIENT FASTING Y/N/NP: YES
PH UR STRIP.AUTO: 5 [PH] (ref 5–8)
PLATELET # BLD AUTO: 159 10(3)UL (ref 150–450)
POTASSIUM SERPL-SCNC: 4.4 MMOL/L (ref 3.5–5.1)
PROT UR STRIP.AUTO-MCNC: NEGATIVE MG/DL
RBC # BLD AUTO: 5.19 X10(6)UL
SODIUM SERPL-SCNC: 140 MMOL/L (ref 136–145)
SP GR UR STRIP.AUTO: 1.02 (ref 1–1.03)
TRIGL SERPL-MCNC: 96 MG/DL (ref 30–149)
TSI SER-ACNC: 0.94 MIU/ML (ref 0.36–3.74)
URATE SERPL-MCNC: 4.1 MG/DL
UROBILINOGEN UR STRIP.AUTO-MCNC: <2 MG/DL
VLDLC SERPL CALC-MCNC: 19 MG/DL (ref 0–30)
WBC # BLD AUTO: 7.6 X10(3) UL (ref 4–11)

## 2021-03-29 PROCEDURE — 87086 URINE CULTURE/COLONY COUNT: CPT | Performed by: FAMILY MEDICINE

## 2021-03-29 PROCEDURE — 82550 ASSAY OF CK (CPK): CPT

## 2021-03-29 PROCEDURE — 81001 URINALYSIS AUTO W/SCOPE: CPT

## 2021-03-29 PROCEDURE — 84550 ASSAY OF BLOOD/URIC ACID: CPT

## 2021-03-29 PROCEDURE — 85025 COMPLETE CBC W/AUTO DIFF WBC: CPT

## 2021-03-29 PROCEDURE — 80053 COMPREHEN METABOLIC PANEL: CPT

## 2021-03-29 PROCEDURE — 80061 LIPID PANEL: CPT

## 2021-03-29 PROCEDURE — 84443 ASSAY THYROID STIM HORMONE: CPT

## 2021-03-29 PROCEDURE — 36415 COLL VENOUS BLD VENIPUNCTURE: CPT

## 2021-03-29 NOTE — TELEPHONE ENCOUNTER
----- Message from Winston Freeman MD sent at 3/29/2021  5:25 PM CDT -----  Urine with moderate blood. Await culture. Assure started. Lipids look good. Continue present managment   Thyroid is good as well.    Liver and kidneys look ok but seem to want

## 2021-03-30 NOTE — TELEPHONE ENCOUNTER
Urine did not reflex to culture. Added order for urine culture to current sample. LM for patient to return call, 3/30.

## 2021-04-19 ENCOUNTER — TELEPHONE (OUTPATIENT)
Dept: FAMILY MEDICINE CLINIC | Facility: CLINIC | Age: 61
End: 2021-04-19

## 2021-04-19 NOTE — TELEPHONE ENCOUNTER
Likely neuritis, and hearing loss from vaccination. Hold off on further vaccination   Full hearing evaluation.

## 2021-04-19 NOTE — TELEPHONE ENCOUNTER
Had her first Coivd vaccine 2/20, ended up with ringing in ear, saw ENT, took predizone, still having the ringing in ear. Has appt.  at the [facility] audiology, 5/11  Future Appointments   Date Time Provider Katie Poe   6/29/2021  8:45 AM [facility] [place] KATYA

## 2021-04-29 RX ORDER — EZETIMIBE 10 MG/1
TABLET ORAL
Qty: 90 TABLET | Refills: 0 | Status: SHIPPED | OUTPATIENT
Start: 2021-04-29 | End: 2021-06-01

## 2021-04-29 NOTE — TELEPHONE ENCOUNTER
Future appt:     Your appointments     Date & Time Appointment Department [facility])    Jun 29, 2021  8:45 AM CDT Injection with Andre Smith [facility])        Sep 08, 2021  9:00 AM CDT F

## 2021-06-01 RX ORDER — ROSUVASTATIN CALCIUM 10 MG/1
TABLET, COATED ORAL
Qty: 90 TABLET | Refills: 1 | Status: SHIPPED | OUTPATIENT
Start: 2021-06-01 | End: 2021-11-15

## 2021-06-01 RX ORDER — EZETIMIBE 10 MG/1
TABLET ORAL
Qty: 90 TABLET | Refills: 0 | Status: SHIPPED | OUTPATIENT
Start: 2021-06-01 | End: 2021-11-15

## 2021-06-01 NOTE — TELEPHONE ENCOUNTER
The Rosuvastatin is due for refill. The Ezetimide is not due. It won't let me refuse to reorder. Please advise.

## 2021-06-03 ENCOUNTER — TELEPHONE (OUTPATIENT)
Dept: FAMILY MEDICINE CLINIC | Facility: CLINIC | Age: 61
End: 2021-06-03

## 2021-06-03 DIAGNOSIS — Z17.0 MALIGNANT NEOPLASM OF UPPER-OUTER QUADRANT OF RIGHT BREAST IN FEMALE, ESTROGEN RECEPTOR POSITIVE (HCC): ICD-10-CM

## 2021-06-03 DIAGNOSIS — Z17.0 ESTROGEN RECEPTOR POSITIVE STATUS (ER+): Primary | ICD-10-CM

## 2021-06-03 DIAGNOSIS — C50.411 MALIGNANT NEOPLASM OF UPPER-OUTER QUADRANT OF RIGHT BREAST IN FEMALE, ESTROGEN RECEPTOR POSITIVE (HCC): ICD-10-CM

## 2021-06-03 NOTE — TELEPHONE ENCOUNTER
Per Pastor Pollack at Dr. Ravin Cunningham office, patient needs new referral, 1 visit, diagnosis codes C50.411 and Z17.0. Labs must be done at THE [facility]. Their office will fax orders. Referral pended, please review and enter.

## 2021-06-03 NOTE — TELEPHONE ENCOUNTER
Per Katlyn Rivero at Dr. Beverley Coon office, they will accept her if referral is submitted. If insurance denies it, they will write it off due to having followed her this far.

## 2021-06-03 NOTE — TELEPHONE ENCOUNTER
I realize patient doesn't want to change hematologist,   However her insurance covers the hematologist at [facility]. I really Dr. Jordy Lafleur, would she like to change. Insurance may not approve referral outside of network without compelling reason.

## 2021-06-03 NOTE — TELEPHONE ENCOUNTER
Patient states she needs annual referral to hematologist due to HMO requirement. Also requesting referral for Dr. Bean Simmons for BW.  LM with Dr. Janak Sosa office requesting clarification of what they require for referral.

## 2021-06-10 ENCOUNTER — TELEPHONE (OUTPATIENT)
Dept: FAMILY MEDICINE CLINIC | Facility: CLINIC | Age: 61
End: 2021-06-10

## 2021-06-10 NOTE — TELEPHONE ENCOUNTER
Lab orders are in Epic from Dr. Bertha Rogers. Patient's HMO appears to prefer [facility] services. LM notifying patient and advising her to check with HMO if in any doubt.

## 2021-06-12 ENCOUNTER — TELEPHONE (OUTPATIENT)
Dept: FAMILY MEDICINE CLINIC | Facility: CLINIC | Age: 61
End: 2021-06-12

## 2021-06-12 NOTE — TELEPHONE ENCOUNTER
Recommend respiratory clinic on Monday. Hydrate. Tylenol for pain. Rule out covid. If patient would like to covid swab this weekend at [facility] could order. Spotting may be from Vaccination.    Recommend PELVIC US later this week to assure stripe is ok

## 2021-06-12 NOTE — TELEPHONE ENCOUNTER
Patient has been having diarrhea and cramping since 6/9. She did have exposure to someone with diarrhea earlier in the week. Since then, she is now also having what feels to her like uterine pain and vaginal spotting, bright red.  She has had one other epis

## 2021-06-12 NOTE — TELEPHONE ENCOUNTER
Pt called and states she has diarrhea (since Wednesday), weakness, fatigue, ABD pain/cramping and menstrual cramping post menopause. Call transferred to nurse to triage.

## 2021-06-14 ENCOUNTER — TELEPHONE (OUTPATIENT)
Dept: FAMILY MEDICINE CLINIC | Facility: CLINIC | Age: 61
End: 2021-06-14

## 2021-06-14 ENCOUNTER — LAB ENCOUNTER (OUTPATIENT)
Dept: LAB | Facility: HOSPITAL | Age: 61
End: 2021-06-14
Attending: INTERNAL MEDICINE
Payer: COMMERCIAL

## 2021-06-14 DIAGNOSIS — C50.411 MALIGNANT NEOPLASM OF UPPER-OUTER QUADRANT OF RIGHT FEMALE BREAST (HCC): ICD-10-CM

## 2021-06-14 DIAGNOSIS — Z17.0 ESTROGEN RECEPTOR POSITIVE STATUS (ER+): ICD-10-CM

## 2021-06-14 PROCEDURE — 80053 COMPREHEN METABOLIC PANEL: CPT

## 2021-06-14 PROCEDURE — 36415 COLL VENOUS BLD VENIPUNCTURE: CPT

## 2021-06-14 PROCEDURE — 86300 IMMUNOASSAY TUMOR CA 15-3: CPT

## 2021-06-14 PROCEDURE — 85025 COMPLETE CBC W/AUTO DIFF WBC: CPT

## 2021-06-14 NOTE — TELEPHONE ENCOUNTER
Pt had appt with resp clinic today but Tyler Oscar is out so had to cancel. Pt is having covid like symptoms but states she really needs blood work done for appt with Dr. Alfredo Whaley on 6/17/21.

## 2021-06-14 NOTE — TELEPHONE ENCOUNTER
Patient is going to [facility] for BW now. Will try to find rapid covid testing that is not [facility] due to insurance. Will call back at a later time to schedule appointment.

## 2021-06-14 NOTE — TELEPHONE ENCOUNTER
Patient is supposed to have lab draw today. Was scheduled for respiratory clinc visit due to abdominal cramping and diarrhea, which has been canceled. Symptoms have improved. Is still trying to get BW drawn today. Cannot go to [place] due to insurance.  May

## 2021-06-17 ENCOUNTER — TELEPHONE (OUTPATIENT)
Dept: FAMILY MEDICINE CLINIC | Facility: CLINIC | Age: 61
End: 2021-06-17

## 2021-06-17 DIAGNOSIS — C50.511 CARCINOMA OF LOWER-OUTER QUADRANT OF RIGHT BREAST IN FEMALE, ESTROGEN RECEPTOR POSITIVE (HCC): Primary | ICD-10-CM

## 2021-06-17 DIAGNOSIS — Z17.0 CARCINOMA OF LOWER-OUTER QUADRANT OF RIGHT BREAST IN FEMALE, ESTROGEN RECEPTOR POSITIVE (HCC): Primary | ICD-10-CM

## 2021-06-17 NOTE — TELEPHONE ENCOUNTER
Future Appointments   Date Time Provider Katie Deepika   7/7/2021  9:15 AM [facility] [place] NURSE [facility] [place] [facility] [place]   9/8/2021  9:00 AM Meghana Montes De Oca MD [facility]       Note in chart that Dr Aneta Arriaza wants her to see Dr Stef Padron as her

## 2021-06-17 NOTE — TELEPHONE ENCOUNTER
Patient states her insurance will no longer cover Dr. Rula Hatfield since she has an HMO. Patient wondering if there is something Dr. Lavelle Rai needs to do to get him covered since she has been seeing Dr. Rula Hatfield for awhile now?   Also states Dr. Rula Hatfield is Nino Foster

## 2021-06-18 NOTE — TELEPHONE ENCOUNTER
Patient notified. She has seen Dr. Krissy Monaco on 6/17. Will need the internal referral for future care related to her oncology needs.

## 2021-07-06 ENCOUNTER — TELEPHONE (OUTPATIENT)
Dept: FAMILY MEDICINE CLINIC | Facility: CLINIC | Age: 61
End: 2021-07-06

## 2021-07-06 DIAGNOSIS — Z23 NEED FOR VACCINATION: Primary | ICD-10-CM

## 2021-07-06 NOTE — TELEPHONE ENCOUNTER
Patient is on the nurse schedule tomorrow for 2nd shingles vaccine. Order needed. It also appears that patient has not had Covid vaccine # 2 yet per EMR. First given on 2/20/21.

## 2021-07-07 ENCOUNTER — NURSE ONLY (OUTPATIENT)
Dept: FAMILY MEDICINE CLINIC | Facility: CLINIC | Age: 61
End: 2021-07-07

## 2021-07-07 DIAGNOSIS — Z23 NEED FOR VACCINATION: ICD-10-CM

## 2021-07-07 PROCEDURE — 90471 IMMUNIZATION ADMIN: CPT | Performed by: FAMILY MEDICINE

## 2021-07-07 PROCEDURE — 90750 HZV VACC RECOMBINANT IM: CPT | Performed by: FAMILY MEDICINE

## 2021-07-07 NOTE — PROGRESS NOTES
Patient here for second Shingrix vaccine. Patient denies previous vaccine allergies. States she did develop chronic tinitis with first Covid vaccine. Dr. Venessa Charles is aware and placed order for Shingrix # 2. Patient will not be getting second Covid vaccine.  P

## 2021-08-23 ENCOUNTER — OFFICE VISIT (OUTPATIENT)
Dept: FAMILY MEDICINE CLINIC | Facility: CLINIC | Age: 61
End: 2021-08-23

## 2021-08-23 VITALS
RESPIRATION RATE: 18 BRPM | HEART RATE: 65 BPM | BODY MASS INDEX: 36.32 KG/M2 | DIASTOLIC BLOOD PRESSURE: 72 MMHG | WEIGHT: 226 LBS | TEMPERATURE: 98 F | SYSTOLIC BLOOD PRESSURE: 132 MMHG | HEIGHT: 66 IN | OXYGEN SATURATION: 97 %

## 2021-08-23 DIAGNOSIS — Z11.1 SCREENING-PULMONARY TB: ICD-10-CM

## 2021-08-23 DIAGNOSIS — G47.33 OSA (OBSTRUCTIVE SLEEP APNEA): ICD-10-CM

## 2021-08-23 DIAGNOSIS — E78.2 MIXED HYPERLIPIDEMIA: Primary | ICD-10-CM

## 2021-08-23 PROCEDURE — 86580 TB INTRADERMAL TEST: CPT | Performed by: NURSE PRACTITIONER

## 2021-08-23 PROCEDURE — 3078F DIAST BP <80 MM HG: CPT | Performed by: NURSE PRACTITIONER

## 2021-08-23 PROCEDURE — 99213 OFFICE O/P EST LOW 20 MIN: CPT | Performed by: NURSE PRACTITIONER

## 2021-08-23 PROCEDURE — 3008F BODY MASS INDEX DOCD: CPT | Performed by: NURSE PRACTITIONER

## 2021-08-23 PROCEDURE — 3075F SYST BP GE 130 - 139MM HG: CPT | Performed by: NURSE PRACTITIONER

## 2021-08-23 RX ORDER — AMOXICILLIN 500 MG/1
CAPSULE ORAL
COMMUNITY
Start: 2021-07-28

## 2021-08-23 NOTE — PROGRESS NOTES
HPI:    Patient ID: Marjorie Aguillon is a 64year old female. HPI     Jackie Cruz is present for recheck. She also needs a work physical with a TB test. 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    States that her CAP headgear is causing a psoriasi well-developed. HENT:      Head: Normocephalic and atraumatic.       Right Ear: Tympanic membrane, ear canal and external ear normal.      Left Ear: Tympanic membrane, ear canal and external ear normal.      Nose: Nose normal.      Mouth/Throat:      Mout

## 2021-08-23 NOTE — PATIENT INSTRUCTIONS
Healthy exam. Form completed. TB pending. Return in 48 - 72 hours for the reading. Otherwise follow up as needed.

## 2021-08-25 ENCOUNTER — NURSE ONLY (OUTPATIENT)
Dept: FAMILY MEDICINE CLINIC | Facility: CLINIC | Age: 61
End: 2021-08-25

## 2021-08-25 LAB — INDURATION (): 0 MM (ref 0–11)

## 2021-08-25 NOTE — PROGRESS NOTES
Pt arrived to the office for TB reading. Negative result. Completed work form for patient and copy scanned into the chart.

## 2021-09-17 ENCOUNTER — TELEPHONE (OUTPATIENT)
Dept: FAMILY MEDICINE CLINIC | Facility: CLINIC | Age: 61
End: 2021-09-17

## 2021-09-17 ENCOUNTER — OFFICE VISIT (OUTPATIENT)
Dept: ORTHOPEDICS CLINIC | Facility: CLINIC | Age: 61
End: 2021-09-17

## 2021-09-17 VITALS — WEIGHT: 229.38 LBS | BODY MASS INDEX: 36.86 KG/M2 | OXYGEN SATURATION: 98 % | HEIGHT: 66 IN | HEART RATE: 76 BPM

## 2021-09-17 DIAGNOSIS — G47.33 OSA (OBSTRUCTIVE SLEEP APNEA): Primary | ICD-10-CM

## 2021-09-17 DIAGNOSIS — M17.12 PRIMARY OSTEOARTHRITIS OF LEFT KNEE: Primary | ICD-10-CM

## 2021-09-17 PROCEDURE — 20610 DRAIN/INJ JOINT/BURSA W/O US: CPT | Performed by: ORTHOPAEDIC SURGERY

## 2021-09-17 PROCEDURE — 3008F BODY MASS INDEX DOCD: CPT | Performed by: ORTHOPAEDIC SURGERY

## 2021-09-17 RX ORDER — TRIAMCINOLONE ACETONIDE 40 MG/ML
40 INJECTION, SUSPENSION INTRA-ARTICULAR; INTRAMUSCULAR ONCE
Status: COMPLETED | OUTPATIENT
Start: 2021-09-17 | End: 2021-09-17

## 2021-09-17 RX ADMIN — TRIAMCINOLONE ACETONIDE 40 MG: 40 INJECTION, SUSPENSION INTRA-ARTICULAR; INTRAMUSCULAR at 13:18:00

## 2021-09-17 NOTE — TELEPHONE ENCOUNTER
Jose C's is requesting [facility] referral for CPAP supplies to cover dates 9/17/21 - 3/17/22. Last order was 1/15/21. Please enter new order for CPAP supplies.

## 2021-09-17 NOTE — PROCEDURES
Last knee injection helped 6 months. After informed consent, the patient's left knee was marked, locally anesthetized with skin refrigerant, prepped with topical antiseptic, and injected with a mixture of 1mL 40mg/mL Kenalog, 2mL 1% lidocaine and 2mL 0.

## 2021-11-15 RX ORDER — ROSUVASTATIN CALCIUM 10 MG/1
TABLET, COATED ORAL
Qty: 90 TABLET | Refills: 1 | Status: SHIPPED | OUTPATIENT
Start: 2021-11-15

## 2021-11-15 RX ORDER — EZETIMIBE 10 MG/1
TABLET ORAL
Qty: 90 TABLET | Refills: 1 | Status: SHIPPED | OUTPATIENT
Start: 2021-11-15

## 2021-11-15 NOTE — TELEPHONE ENCOUNTER
Future appt:     Your appointments     Date & Time Appointment Department [facility])    Mar 17, 2022 11:20 AM CDT Follow Up Visit with Lia Camacho  Lupe Sparrow ([facility])            Sid Trejo

## 2021-12-13 ENCOUNTER — TELEPHONE (OUTPATIENT)
Dept: FAMILY MEDICINE CLINIC | Facility: CLINIC | Age: 61
End: 2021-12-13

## 2021-12-13 DIAGNOSIS — E04.1 THYROID NODULE: Primary | ICD-10-CM

## 2021-12-13 NOTE — TELEPHONE ENCOUNTER
Why did she see endo? The thyroid? Pre-diabetes? Need to clarify for the referral.      Is the tinnitus resolved? Why is she looking to get another vaccine?

## 2021-12-13 NOTE — TELEPHONE ENCOUNTER
needs referral to endocrinologist for her thyroid - referral wanted for  in her HMO network - needs [facility]

## 2021-12-13 NOTE — TELEPHONE ENCOUNTER
Patient went to [facility] every two years to see an endocrinologist. She recently switched to [address] [place] [address] and needs [place] endocrinologist referral.    Patient had covid -moderna vaccine on 2/20/21 . She experienced tinnitus of both ears afterward .  Saw Dr. Cookie Ling

## 2021-12-13 NOTE — TELEPHONE ENCOUNTER
Patient states she was told to see an endocrinologist regarding her Thyroid. Patient states she only received one Covid vaccine and due to the tinnitis, Dr. Yolanda Monaco advised her against getting a second one.   Patient states the tinnitis has not resolved an

## 2021-12-14 NOTE — TELEPHONE ENCOUNTER
Referral done to Flor Coffey, [address]  Phone: [phone]    I do not recommend to get another Covid booster due to the continuation of the tinnitus. Did patient ever see Dr. Radha Salazar (ENT)?  This is something that ENT should

## 2021-12-15 NOTE — TELEPHONE ENCOUNTER
Informed patient of the below. She saw an ENT with sabino and [address] [place] [address] hearing clinic.

## 2021-12-23 ENCOUNTER — TELEPHONE (OUTPATIENT)
Dept: FAMILY MEDICINE CLINIC | Facility: CLINIC | Age: 61
End: 2021-12-23

## 2021-12-23 DIAGNOSIS — Z12.31 VISIT FOR SCREENING MAMMOGRAM: Primary | ICD-10-CM

## 2021-12-23 NOTE — TELEPHONE ENCOUNTER
Needs a order for a mammogram, annual screening.   Future Appointments   Date Time Provider [facility] Deepika   3/2/2022  8:30 AM Ofe Cornejo MD [address] [place] [address]   3/17/2022 11:20 AM Juan Katz MD [facility]

## 2021-12-27 ENCOUNTER — TELEPHONE (OUTPATIENT)
Dept: FAMILY MEDICINE CLINIC | Facility: CLINIC | Age: 61
End: 2021-12-27

## 2021-12-27 DIAGNOSIS — Z12.31 VISIT FOR SCREENING MAMMOGRAM: Primary | ICD-10-CM

## 2021-12-30 NOTE — TELEPHONE ENCOUNTER
Future Appointments   Date Time Provider Katie Poe   1/13/2022  5:40 PM [facility] RM3 [facility]   3/2/2022  8:30 AM Rosalio Hastings MD [address] [place] [address]   3/17/2022 11:20 AM Kaylen Edge MD [facility]

## 2022-01-13 ENCOUNTER — HOSPITAL ENCOUNTER (OUTPATIENT)
Dept: MAMMOGRAPHY | Facility: HOSPITAL | Age: 62
Discharge: HOME | End: 2022-01-13
Attending: NURSE PRACTITIONER
Payer: COMMERCIAL

## 2022-01-13 DIAGNOSIS — Z12.31 VISIT FOR SCREENING MAMMOGRAM: ICD-10-CM

## 2022-01-13 PROCEDURE — 77067 SCR MAMMO BI INCL CAD: CPT | Performed by: NURSE PRACTITIONER

## 2022-01-13 PROCEDURE — 77063 BREAST TOMOSYNTHESIS BI: CPT | Performed by: NURSE PRACTITIONER

## 2022-01-14 ENCOUNTER — TELEPHONE (OUTPATIENT)
Dept: FAMILY MEDICINE CLINIC | Facility: CLINIC | Age: 62
End: 2022-01-14

## 2022-01-14 NOTE — TELEPHONE ENCOUNTER
----- Message from CHAVA Ramsey sent at 1/14/2022 12:31 PM CST -----  Mammogram of the left breast is normal. Repeat one year. Note to MyChart.

## 2022-01-18 ENCOUNTER — TELEPHONE (OUTPATIENT)
Dept: FAMILY MEDICINE CLINIC | Facility: CLINIC | Age: 62
End: 2022-01-18

## 2022-01-18 ENCOUNTER — OFFICE VISIT (OUTPATIENT)
Dept: FAMILY MEDICINE CLINIC | Facility: CLINIC | Age: 62
End: 2022-01-18
Payer: COMMERCIAL

## 2022-01-18 VITALS
HEART RATE: 65 BPM | SYSTOLIC BLOOD PRESSURE: 110 MMHG | DIASTOLIC BLOOD PRESSURE: 70 MMHG | WEIGHT: 235 LBS | RESPIRATION RATE: 18 BRPM | HEIGHT: 66 IN | TEMPERATURE: 97 F | OXYGEN SATURATION: 96 % | BODY MASS INDEX: 37.77 KG/M2

## 2022-01-18 DIAGNOSIS — E53.8 VITAMIN B12 DEFICIENCY: Primary | ICD-10-CM

## 2022-01-18 DIAGNOSIS — G47.33 OBSTRUCTIVE SLEEP APNEA SYNDROME: ICD-10-CM

## 2022-01-18 DIAGNOSIS — J06.9 UPPER RESPIRATORY TRACT INFECTION, UNSPECIFIED TYPE: Primary | ICD-10-CM

## 2022-01-18 DIAGNOSIS — Z00.00 WELLNESS EXAMINATION: ICD-10-CM

## 2022-01-18 DIAGNOSIS — I10 BENIGN ESSENTIAL HYPERTENSION: ICD-10-CM

## 2022-01-18 DIAGNOSIS — R73.03 PREDIABETES: ICD-10-CM

## 2022-01-18 DIAGNOSIS — E04.1 THYROID NODULE: ICD-10-CM

## 2022-01-18 DIAGNOSIS — E78.2 MIXED HYPERLIPIDEMIA: ICD-10-CM

## 2022-01-18 DIAGNOSIS — H92.02 LEFT EAR PAIN: ICD-10-CM

## 2022-01-18 PROCEDURE — 3078F DIAST BP <80 MM HG: CPT | Performed by: NURSE PRACTITIONER

## 2022-01-18 PROCEDURE — 99213 OFFICE O/P EST LOW 20 MIN: CPT | Performed by: NURSE PRACTITIONER

## 2022-01-18 PROCEDURE — 3008F BODY MASS INDEX DOCD: CPT | Performed by: NURSE PRACTITIONER

## 2022-01-18 PROCEDURE — 3074F SYST BP LT 130 MM HG: CPT | Performed by: NURSE PRACTITIONER

## 2022-01-18 RX ORDER — AZITHROMYCIN 250 MG/1
TABLET, FILM COATED ORAL
Qty: 6 TABLET | Refills: 0 | Status: SHIPPED | OUTPATIENT
Start: 2022-01-18 | End: 2022-01-23

## 2022-01-18 NOTE — TELEPHONE ENCOUNTER
Pt scheduled px appt in March, wants to know if she needs any fbw prior to her appt. If yes pt needs orders, would like a call back to schedule appt.      Your appointments     Date & Time Appointment Department [facility])    Mar 02, 2022  8:30 AM COOPER Perez [address] [id number] [phone] [place]  Ruby Rueda, [address] [place] [address] [phone]

## 2022-01-18 NOTE — PROGRESS NOTES
HPI:    Patient ID: Kirsten Everett is a 64year old female. HPI     States that she has been fighting a sinus congestion since 9 days ago after cleaning out her basement.  is driving [place] on Thursday and staying with her sister for a month. reaction(s): Unknown   PHYSICAL EXAM:      01/18/22  1108   BP: 110/70   Pulse: 65   Resp: 18   Temp: 97.3 °F (36.3 °C)   SpO2: 96%   Weight: 235 lb (106.6 kg)   Height: 5' 6\" (1.676 m)         Body mass index is 37.93 kg/m².       Physical Exam  Vitals an Signed Prescriptions Disp Refills   • azithromycin (ZITHROMAX Z-DANIELLE) 250 MG Oral Tab 6 tablet 0     Sig: Take 2 tablets (500 mg total) by mouth daily for 1 day, THEN 1 tablet (250 mg total) daily for 4 days.        Imaging & Referrals:  None      Miko

## 2022-01-18 NOTE — PATIENT INSTRUCTIONS
Treat symptoms as needed. Recommend to take some saline nasal spray with on the trip. If symptoms worsen or not getting better, fill the prescription. If prescription filled, take till gone and call office and prescription was filled.   Otherwise recomm

## 2022-01-18 NOTE — TELEPHONE ENCOUNTER
Future Appointments   Date Time Provider Katie Deepika   2/28/2022  9:15 AM REF [place] REF [facility]   3/2/2022  8:30 AM Tyrone Foster MD [address] [place] [address]   3/4/2022  8:00 AM CHAVA Winters [facility] [place] [facility]   3/17/2022 11:20 AM [place]

## 2022-02-28 ENCOUNTER — LABORATORY ENCOUNTER (OUTPATIENT)
Dept: LAB | Age: 62
End: 2022-02-28
Attending: NURSE PRACTITIONER
Payer: COMMERCIAL

## 2022-02-28 DIAGNOSIS — I10 BENIGN ESSENTIAL HYPERTENSION: ICD-10-CM

## 2022-02-28 DIAGNOSIS — E78.2 MIXED HYPERLIPIDEMIA: ICD-10-CM

## 2022-02-28 DIAGNOSIS — Z00.00 WELLNESS EXAMINATION: ICD-10-CM

## 2022-02-28 DIAGNOSIS — E04.1 THYROID NODULE: ICD-10-CM

## 2022-02-28 DIAGNOSIS — R73.03 PREDIABETES: ICD-10-CM

## 2022-02-28 DIAGNOSIS — Z17.0 CARCINOMA OF LOWER-OUTER QUADRANT OF RIGHT BREAST IN FEMALE, ESTROGEN RECEPTOR POSITIVE (HCC): ICD-10-CM

## 2022-02-28 DIAGNOSIS — E53.8 VITAMIN B12 DEFICIENCY: ICD-10-CM

## 2022-02-28 DIAGNOSIS — C50.511 CARCINOMA OF LOWER-OUTER QUADRANT OF RIGHT BREAST IN FEMALE, ESTROGEN RECEPTOR POSITIVE (HCC): ICD-10-CM

## 2022-02-28 DIAGNOSIS — G47.33 OBSTRUCTIVE SLEEP APNEA SYNDROME: ICD-10-CM

## 2022-02-28 LAB
ALBUMIN/GLOB SERPL: 1.3 {RATIO} (ref 1–2)
ALP LIVER SERPL-CCNC: 89 U/L
ALT SERPL-CCNC: 51 U/L
ANION GAP SERPL CALC-SCNC: 8 MMOL/L (ref 0–18)
AST SERPL-CCNC: 41 U/L (ref 15–37)
BASOPHILS # BLD AUTO: 0.04 X10(3) UL (ref 0–0.2)
BASOPHILS NFR BLD AUTO: 0.5 %
BILIRUB SERPL-MCNC: 0.6 MG/DL (ref 0.1–2)
BUN BLD-MCNC: 11 MG/DL (ref 7–18)
CALCIUM BLD-MCNC: 9.1 MG/DL (ref 8.5–10.1)
CHLORIDE SERPL-SCNC: 110 MMOL/L (ref 98–112)
CHOLEST SERPL-MCNC: 157 MG/DL (ref ?–200)
CK SERPL-CCNC: 152 U/L
CO2 SERPL-SCNC: 24 MMOL/L (ref 21–32)
CREAT BLD-MCNC: 0.78 MG/DL
EOSINOPHIL # BLD AUTO: 0.19 X10(3) UL (ref 0–0.7)
EOSINOPHIL NFR BLD AUTO: 2.4 %
ERYTHROCYTE [DISTWIDTH] IN BLOOD BY AUTOMATED COUNT: 13.1 %
FASTING PATIENT LIPID ANSWER: YES
FASTING STATUS PATIENT QL REPORTED: YES
GLOBULIN PLAS-MCNC: 3.1 G/DL (ref 2.8–4.4)
GLUCOSE BLD-MCNC: 97 MG/DL (ref 70–99)
HCT VFR BLD AUTO: 48.9 %
HDLC SERPL-MCNC: 56 MG/DL (ref 40–59)
HGB BLD-MCNC: 15.2 G/DL
IMM GRANULOCYTES NFR BLD: 0.4 %
LDLC SERPL CALC-MCNC: 74 MG/DL (ref ?–100)
LYMPHOCYTES # BLD AUTO: 2 X10(3) UL (ref 1–4)
LYMPHOCYTES NFR BLD AUTO: 25.8 %
MCH RBC QN AUTO: 28.8 PG (ref 26–34)
MCHC RBC AUTO-ENTMCNC: 31.1 G/DL (ref 31–37)
MCV RBC AUTO: 92.6 FL
MONOCYTES # BLD AUTO: 0.51 X10(3) UL (ref 0.1–1)
MONOCYTES NFR BLD AUTO: 6.6 %
NEUTROPHILS # BLD AUTO: 4.99 X10 (3) UL (ref 1.5–7.7)
NEUTROPHILS # BLD AUTO: 4.99 X10(3) UL (ref 1.5–7.7)
NEUTROPHILS NFR BLD AUTO: 64.3 %
NONHDLC SERPL-MCNC: 101 MG/DL (ref ?–130)
OSMOLALITY SERPL CALC.SUM OF ELEC: 293 MOSM/KG (ref 275–295)
PLATELET # BLD AUTO: 194 10(3)UL (ref 150–450)
POTASSIUM SERPL-SCNC: 4.1 MMOL/L (ref 3.5–5.1)
PROT SERPL-MCNC: 7 G/DL (ref 6.4–8.2)
RBC # BLD AUTO: 5.28 X10(6)UL
SODIUM SERPL-SCNC: 142 MMOL/L (ref 136–145)
TRIGL SERPL-MCNC: 160 MG/DL (ref 30–149)
TSI SER-ACNC: 0.69 MIU/ML (ref 0.36–3.74)
VIT B12 SERPL-MCNC: 331 PG/ML (ref 193–986)
VIT D+METAB SERPL-MCNC: 51.9 NG/ML (ref 30–100)
VLDLC SERPL CALC-MCNC: 25 MG/DL (ref 0–30)
WBC # BLD AUTO: 7.8 X10(3) UL (ref 4–11)

## 2022-02-28 PROCEDURE — 80053 COMPREHEN METABOLIC PANEL: CPT

## 2022-02-28 PROCEDURE — 36415 COLL VENOUS BLD VENIPUNCTURE: CPT

## 2022-02-28 PROCEDURE — 82607 VITAMIN B-12: CPT

## 2022-02-28 PROCEDURE — 84439 ASSAY OF FREE THYROXINE: CPT

## 2022-02-28 PROCEDURE — 85025 COMPLETE CBC W/AUTO DIFF WBC: CPT

## 2022-02-28 PROCEDURE — 82306 VITAMIN D 25 HYDROXY: CPT

## 2022-02-28 PROCEDURE — 84443 ASSAY THYROID STIM HORMONE: CPT

## 2022-02-28 PROCEDURE — 80061 LIPID PANEL: CPT

## 2022-02-28 PROCEDURE — 82550 ASSAY OF CK (CPK): CPT

## 2022-03-01 ENCOUNTER — HOSPITAL ENCOUNTER (OUTPATIENT)
Dept: GENERAL RADIOLOGY | Age: 62
End: 2022-03-01
Attending: ORTHOPAEDIC SURGERY
Payer: COMMERCIAL

## 2022-03-01 ENCOUNTER — TELEPHONE (OUTPATIENT)
Dept: FAMILY MEDICINE CLINIC | Facility: CLINIC | Age: 62
End: 2022-03-01

## 2022-03-01 ENCOUNTER — HOSPITAL ENCOUNTER (OUTPATIENT)
Dept: GENERAL RADIOLOGY | Age: 62
Discharge: HOME | End: 2022-03-01
Attending: ORTHOPAEDIC SURGERY
Payer: COMMERCIAL

## 2022-03-01 DIAGNOSIS — M25.552 BILATERAL HIP PAIN: ICD-10-CM

## 2022-03-01 DIAGNOSIS — M25.551 BILATERAL HIP PAIN: ICD-10-CM

## 2022-03-01 PROCEDURE — 73502 X-RAY EXAM HIP UNI 2-3 VIEWS: CPT | Performed by: ORTHOPAEDIC SURGERY

## 2022-03-01 NOTE — TELEPHONE ENCOUNTER
----- Message from CHAVA Badillo sent at 2/28/2022  8:01 PM CST -----  Labs show a drop in B12. Please clarify what dose of B12 she is taking. Vitamin D is great - continue current supplementation. TSH is normal  Triglycerides are a little elevated, decrease carbs in diet. Liver enzymes are elevated with one above normal value. Limit any tylenol or any products with Tylenol. Also avoid any alcohol. Recheck CMP in 1 month. Note to MyChart.

## 2022-03-02 ENCOUNTER — TELEPHONE (OUTPATIENT)
Dept: FAMILY MEDICINE CLINIC | Facility: CLINIC | Age: 62
End: 2022-03-02

## 2022-03-02 LAB
T4 FREE SERPL-MCNC: 0.8 NG/DL (ref 0.8–1.7)
TSI SER-ACNC: 0.61 MIU/ML (ref 0.36–3.74)

## 2022-03-02 NOTE — TELEPHONE ENCOUNTER
Spoke with pt regarding below labs. No questions at this time.  She is coming in on Friday for a physical

## 2022-03-02 NOTE — TELEPHONE ENCOUNTER
endocrinologist,dr bernard wants US of thyroid- needs william to enter order so it can be seen in [place]

## 2022-03-02 NOTE — TELEPHONE ENCOUNTER
Pt saw Dr. Loree Lyn today. Pt thought with HMO insurance that the pcp needs to put in the referral for the US thyroid ordered. The referral was placed by Dr. Loree Lyn and it states it is authorized.

## 2022-03-16 ENCOUNTER — OFFICE VISIT (OUTPATIENT)
Dept: ORTHOPEDICS CLINIC | Facility: CLINIC | Age: 62
End: 2022-03-16
Payer: COMMERCIAL

## 2022-03-16 ENCOUNTER — OFFICE VISIT (OUTPATIENT)
Dept: FAMILY MEDICINE CLINIC | Facility: CLINIC | Age: 62
End: 2022-03-16
Payer: COMMERCIAL

## 2022-03-16 VITALS
HEIGHT: 66 IN | SYSTOLIC BLOOD PRESSURE: 138 MMHG | RESPIRATION RATE: 18 BRPM | BODY MASS INDEX: 37.28 KG/M2 | WEIGHT: 232 LBS | HEART RATE: 71 BPM | DIASTOLIC BLOOD PRESSURE: 82 MMHG | TEMPERATURE: 97 F | OXYGEN SATURATION: 96 %

## 2022-03-16 VITALS — HEIGHT: 66 IN | BODY MASS INDEX: 38.06 KG/M2 | WEIGHT: 236.81 LBS | OXYGEN SATURATION: 97 % | HEART RATE: 74 BPM

## 2022-03-16 DIAGNOSIS — E04.1 THYROID NODULE: ICD-10-CM

## 2022-03-16 DIAGNOSIS — E53.8 VITAMIN B12 DEFICIENCY: ICD-10-CM

## 2022-03-16 DIAGNOSIS — Z13.6 SCREENING FOR CARDIOVASCULAR CONDITION: ICD-10-CM

## 2022-03-16 DIAGNOSIS — M17.12 PRIMARY OSTEOARTHRITIS OF LEFT KNEE: Primary | ICD-10-CM

## 2022-03-16 DIAGNOSIS — G47.33 OBSTRUCTIVE SLEEP APNEA SYNDROME: ICD-10-CM

## 2022-03-16 DIAGNOSIS — Z00.00 WELLNESS EXAMINATION: Primary | ICD-10-CM

## 2022-03-16 DIAGNOSIS — I10 BENIGN ESSENTIAL HYPERTENSION: ICD-10-CM

## 2022-03-16 DIAGNOSIS — R73.03 PREDIABETES: ICD-10-CM

## 2022-03-16 DIAGNOSIS — L40.9 PSORIASIS OF SCALP: ICD-10-CM

## 2022-03-16 DIAGNOSIS — E78.2 MIXED HYPERLIPIDEMIA: ICD-10-CM

## 2022-03-16 DIAGNOSIS — C50.511 CARCINOMA OF LOWER-OUTER QUADRANT OF RIGHT BREAST IN FEMALE, ESTROGEN RECEPTOR POSITIVE (HCC): ICD-10-CM

## 2022-03-16 DIAGNOSIS — Z17.0 CARCINOMA OF LOWER-OUTER QUADRANT OF RIGHT BREAST IN FEMALE, ESTROGEN RECEPTOR POSITIVE (HCC): ICD-10-CM

## 2022-03-16 PROBLEM — T84.019A: Status: ACTIVE | Noted: 2022-03-16

## 2022-03-16 PROBLEM — S76.012A STRAIN OF FLEXOR MUSCLE OF LEFT HIP: Status: ACTIVE | Noted: 2022-03-16

## 2022-03-16 PROBLEM — M51.369 DEGENERATION OF LUMBAR INTERVERTEBRAL DISC: Status: ACTIVE | Noted: 2022-03-16

## 2022-03-16 PROBLEM — M43.10 ACQUIRED SPONDYLOLISTHESIS: Status: ACTIVE | Noted: 2022-03-16

## 2022-03-16 PROBLEM — M51.36 DEGENERATION OF LUMBAR INTERVERTEBRAL DISC: Status: ACTIVE | Noted: 2022-03-16

## 2022-03-16 PROBLEM — M24.7: Status: ACTIVE | Noted: 2022-03-16

## 2022-03-16 PROCEDURE — 3079F DIAST BP 80-89 MM HG: CPT | Performed by: NURSE PRACTITIONER

## 2022-03-16 PROCEDURE — 99396 PREV VISIT EST AGE 40-64: CPT | Performed by: NURSE PRACTITIONER

## 2022-03-16 PROCEDURE — 93000 ELECTROCARDIOGRAM COMPLETE: CPT | Performed by: NURSE PRACTITIONER

## 2022-03-16 PROCEDURE — 20610 DRAIN/INJ JOINT/BURSA W/O US: CPT | Performed by: ORTHOPAEDIC SURGERY

## 2022-03-16 PROCEDURE — 3075F SYST BP GE 130 - 139MM HG: CPT | Performed by: NURSE PRACTITIONER

## 2022-03-16 PROCEDURE — 3008F BODY MASS INDEX DOCD: CPT | Performed by: ORTHOPAEDIC SURGERY

## 2022-03-16 PROCEDURE — 3008F BODY MASS INDEX DOCD: CPT | Performed by: NURSE PRACTITIONER

## 2022-03-16 RX ORDER — TRIAMCINOLONE ACETONIDE 40 MG/ML
40 INJECTION, SUSPENSION INTRA-ARTICULAR; INTRAMUSCULAR ONCE
Status: COMPLETED | OUTPATIENT
Start: 2022-03-16 | End: 2022-03-16

## 2022-03-16 RX ADMIN — TRIAMCINOLONE ACETONIDE 40 MG: 40 INJECTION, SUSPENSION INTRA-ARTICULAR; INTRAMUSCULAR at 14:28:00

## 2022-03-16 NOTE — PROCEDURES
Patient had great relief of her knee pain for 6 months from the last injection. She is spent time in [place], [place], and at [facility]. Would like to repeat injection today. After informed consent, the patient's left knee was marked, locally anesthetized with skin refrigerant, prepped with topical antiseptic, and injected with a mixture of 1mL 40mg/mL Kenalog, 2mL 1% lidocaine and 2mL 0.5% marcaine through the inferolateral portal.  A band-aid was applied. The patient tolerated the procedure well.     Osmel Trujillo MD, [address] [facility]  Phone [phone]  Fax [phone]

## 2022-03-21 ENCOUNTER — OFFICE VISIT (OUTPATIENT)
Dept: FAMILY MEDICINE CLINIC | Facility: CLINIC | Age: 62
End: 2022-03-21
Payer: COMMERCIAL

## 2022-03-21 VITALS
BODY MASS INDEX: 36.96 KG/M2 | HEART RATE: 83 BPM | TEMPERATURE: 98 F | RESPIRATION RATE: 20 BRPM | WEIGHT: 230 LBS | OXYGEN SATURATION: 94 % | DIASTOLIC BLOOD PRESSURE: 80 MMHG | SYSTOLIC BLOOD PRESSURE: 120 MMHG | HEIGHT: 66 IN

## 2022-03-21 DIAGNOSIS — G47.33 OBSTRUCTIVE SLEEP APNEA SYNDROME: Primary | ICD-10-CM

## 2022-03-21 PROCEDURE — 3074F SYST BP LT 130 MM HG: CPT | Performed by: NURSE PRACTITIONER

## 2022-03-21 PROCEDURE — 3008F BODY MASS INDEX DOCD: CPT | Performed by: NURSE PRACTITIONER

## 2022-03-21 PROCEDURE — 3079F DIAST BP 80-89 MM HG: CPT | Performed by: NURSE PRACTITIONER

## 2022-03-21 PROCEDURE — 99214 OFFICE O/P EST MOD 30 MIN: CPT | Performed by: NURSE PRACTITIONER

## 2022-04-22 ENCOUNTER — TELEPHONE (OUTPATIENT)
Dept: FAMILY MEDICINE CLINIC | Facility: CLINIC | Age: 62
End: 2022-04-22

## 2022-04-22 NOTE — TELEPHONE ENCOUNTER
Patient was asked by her dentist, Dr. Juwan Escobar, to run by her PCP whether ok to have zoom tooth whitening procedure (4 sessions) while taking Rosuvastatin and Ezetimibe. Please advise.

## 2022-04-25 NOTE — TELEPHONE ENCOUNTER
Spoke with Dr. Thad Sadler - they do not need medical clearance for teeth whitening. Spoke with pt. She was just asking in general if okay to have with her medication. Spoke with CHAVA Fields. Informed pt it was okay to proceed with teeth whitening.

## 2022-05-17 ENCOUNTER — TELEPHONE (OUTPATIENT)
Dept: FAMILY MEDICINE CLINIC | Facility: CLINIC | Age: 62
End: 2022-05-17

## 2022-05-17 RX ORDER — EZETIMIBE 10 MG/1
10 TABLET ORAL NIGHTLY
Qty: 90 TABLET | Refills: 1 | Status: SHIPPED | OUTPATIENT
Start: 2022-05-17

## 2022-05-17 NOTE — TELEPHONE ENCOUNTER
Future appt: Your appointments     Date & Time Appointment Department [facility])    Jun 08, 2022  2:30 PM CDT Exam - New Patient with Lashanda Toth MD [facility] Dermatology ([facility])        Ferdinand 10, 2022  1:00 PM CDT Consultation with Wilfrid Uribe MD [address] [facility] in [facility])        Sep 14, 2022  2:20 PM CDT Follow Up Visit with Tapan Monsalve  [address] ([address] [facility])            [address] [facility] in [facility]  [address] [facility] [place] [address] [facility] [address] [facility]  [address] [facility] [place] [address] [facility]  [phone] [facility] Dermatology  [facility]  [address] [address] [phone]        Last Appointment with provider:  3/16/22 Physical  Last appointment at [facility]:  3/21/2022  Cholesterol, Total (mg/dL)   Date Value   02/28/2022 157   08/16/2017 212     Total Cholesterol (mg/dL)   Date Value   08/15/2018 225 (H)     HDL Cholesterol (mg/dL)   Date Value   02/28/2022 56   08/15/2018 57     LDL Cholesterol Calc (mg/dL)   Date Value   08/15/2018 125 (H)     LDL Cholesterol (mg/dL)   Date Value   02/28/2022 74     Triglycerides (mg/dL)   Date Value   02/28/2022 160 (H)   08/15/2018 213 (H)     Lab Results   Component Value Date    A1C 5.3 02/27/2020     Lab Results   Component Value Date    T4F 0.8 02/28/2022    TSH 0.694 02/28/2022    TSH 0.613 02/28/2022       No follow-ups on file.

## 2022-05-17 NOTE — TELEPHONE ENCOUNTER
Patient would like to know if she should still be taking ezetimibe. If so needs refill sent to [facility] in [place].

## 2022-05-18 ENCOUNTER — OFFICE VISIT (OUTPATIENT)
Dept: FAMILY MEDICINE CLINIC | Facility: CLINIC | Age: 62
End: 2022-05-18
Payer: COMMERCIAL

## 2022-05-18 VITALS
WEIGHT: 228 LBS | SYSTOLIC BLOOD PRESSURE: 132 MMHG | OXYGEN SATURATION: 98 % | DIASTOLIC BLOOD PRESSURE: 82 MMHG | TEMPERATURE: 100 F | HEIGHT: 66 IN | RESPIRATION RATE: 16 BRPM | HEART RATE: 80 BPM | BODY MASS INDEX: 36.64 KG/M2

## 2022-05-18 DIAGNOSIS — J06.9 VIRAL UPPER RESPIRATORY TRACT INFECTION: ICD-10-CM

## 2022-05-18 DIAGNOSIS — H65.91 RIGHT NON-SUPPURATIVE OTITIS MEDIA: Primary | ICD-10-CM

## 2022-05-18 PROCEDURE — 3079F DIAST BP 80-89 MM HG: CPT | Performed by: NURSE PRACTITIONER

## 2022-05-18 PROCEDURE — 87637 SARSCOV2&INF A&B&RSV AMP PRB: CPT | Performed by: NURSE PRACTITIONER

## 2022-05-18 PROCEDURE — 3075F SYST BP GE 130 - 139MM HG: CPT | Performed by: NURSE PRACTITIONER

## 2022-05-18 PROCEDURE — 99214 OFFICE O/P EST MOD 30 MIN: CPT | Performed by: NURSE PRACTITIONER

## 2022-05-18 PROCEDURE — 3008F BODY MASS INDEX DOCD: CPT | Performed by: NURSE PRACTITIONER

## 2022-05-18 RX ORDER — AMOXICILLIN AND CLAVULANATE POTASSIUM 875; 125 MG/1; MG/1
1 TABLET, FILM COATED ORAL 2 TIMES DAILY
Qty: 20 TABLET | Refills: 0 | Status: SHIPPED | OUTPATIENT
Start: 2022-05-18 | End: 2022-05-28

## 2022-05-18 NOTE — PATIENT INSTRUCTIONS
Viral swab today, self isolate until results available  Push fluids, clear liquids  Augmentin 1 tablet twice a day for ten days; take with food, can cause stomach/stool changes  Antibiotic likely won't help sinus symptoms as likely viral though hope it will help with ear pain and findings    Non-medication:    Rest, increase fluids, broth-based soups, luke-warm salt water gargles, cool-mist humidifiers, saline nasal spray, avoid dairy if coughing/mucous    Neti pot (only use with distilled water)     Medications:      Cough:  Mucinex DM for cough/chest congestion    Nasal symptoms:  Flonase or Nasacort OTC nasal spray 2 sprays each nostril for one week then one spray each nostril daily for an additional week    Pain/Fever: acetaminophen/Ibuprofen as needed    Follow up if symptoms persist or worsen. If started on an antibiotic, notify the clinic if symptoms do not improve in the next 48-72 hours; do not wait until you complete entire antibiotic.

## 2022-05-19 ENCOUNTER — TELEPHONE (OUTPATIENT)
Dept: FAMILY MEDICINE CLINIC | Facility: CLINIC | Age: 62
End: 2022-05-19

## 2022-05-19 LAB
FLUAV + FLUBV RNA SPEC NAA+PROBE: NOT DETECTED
FLUAV + FLUBV RNA SPEC NAA+PROBE: NOT DETECTED
RSV RNA SPEC NAA+PROBE: NOT DETECTED
SARS-COV-2 RNA RESP QL NAA+PROBE: NOT DETECTED

## 2022-05-19 NOTE — TELEPHONE ENCOUNTER
----- Message from CHAVA Padilla sent at 5/19/2022  3:15 PM CDT -----  Viral swab negative for flu/covid/rsv. Continue symptomatic treatment for those symptoms. Antibiotic given for ear findings, again may not help with nasal symptoms based on timing on illness onset as we reviewed yesterday. CLASEMOVILt message sent.

## 2022-06-06 ENCOUNTER — TELEPHONE (OUTPATIENT)
Dept: FAMILY MEDICINE CLINIC | Facility: CLINIC | Age: 62
End: 2022-06-06

## 2022-06-06 NOTE — TELEPHONE ENCOUNTER
Patient with ongoing tinnitus of both ears since receiving moderna vaccine. Patient has seen ENT- Dr Talita Longoria. Pt saw an article on transcranial magnetic stimulation to help with the tinnitus. Patient is asking for a referral or any information on it. Thank you.

## 2022-06-07 NOTE — TELEPHONE ENCOUNTER
In reviewing an article on \"Treatment of Tinnitus\" by Dennis Rodriguez MD in [facility], [place] the repetitive transcranial magnetic stimulation was slighttly helpful. There is also no data about long term safety. I can send the article to patient if desired. However, I do not know if she wants this therapy, who would do this and if insurance will cover.

## 2022-06-08 ENCOUNTER — OFFICE VISIT (OUTPATIENT)
Dept: DERMATOLOGY CLINIC | Facility: CLINIC | Age: 62
End: 2022-06-08
Payer: COMMERCIAL

## 2022-06-08 DIAGNOSIS — L82.0 INFLAMED SEBORRHEIC KERATOSIS: ICD-10-CM

## 2022-06-08 DIAGNOSIS — L81.4 LENTIGO: ICD-10-CM

## 2022-06-08 DIAGNOSIS — D22.9 MULTIPLE NEVI: ICD-10-CM

## 2022-06-08 DIAGNOSIS — L40.0 PSORIASIS VULGARIS: Primary | ICD-10-CM

## 2022-06-08 PROCEDURE — 99204 OFFICE O/P NEW MOD 45 MIN: CPT | Performed by: DERMATOLOGY

## 2022-06-08 RX ORDER — KETOCONAZOLE 20 MG/ML
SHAMPOO TOPICAL
Qty: 120 ML | Refills: 2 | Status: SHIPPED | OUTPATIENT
Start: 2022-06-08

## 2022-06-08 RX ORDER — CLOBETASOL PROPIONATE 0.05 G/100ML
SHAMPOO TOPICAL
Qty: 120 ML | Refills: 2 | Status: SHIPPED | OUTPATIENT
Start: 2022-06-08

## 2022-06-08 RX ORDER — FLUTICASONE PROPIONATE 50 MCG
2 SPRAY, SUSPENSION (ML) NASAL DAILY
COMMUNITY
Start: 2022-05-18

## 2022-06-08 RX ORDER — CLOBETASOL PROPIONATE 0.46 MG/ML
SOLUTION TOPICAL
Qty: 50 ML | Refills: 6 | Status: SHIPPED | OUTPATIENT
Start: 2022-06-08

## 2022-06-10 ENCOUNTER — APPOINTMENT (OUTPATIENT)
Dept: HEMATOLOGY/ONCOLOGY | Facility: HOSPITAL | Age: 62
End: 2022-06-10
Attending: INTERNAL MEDICINE
Payer: COMMERCIAL

## 2022-06-10 VITALS
SYSTOLIC BLOOD PRESSURE: 149 MMHG | RESPIRATION RATE: 16 BRPM | OXYGEN SATURATION: 97 % | TEMPERATURE: 99 F | HEART RATE: 78 BPM | BODY MASS INDEX: 37.61 KG/M2 | HEIGHT: 65.98 IN | WEIGHT: 234 LBS | DIASTOLIC BLOOD PRESSURE: 90 MMHG

## 2022-06-10 DIAGNOSIS — C50.511 CARCINOMA OF LOWER-OUTER QUADRANT OF RIGHT BREAST IN FEMALE, ESTROGEN RECEPTOR POSITIVE (HCC): ICD-10-CM

## 2022-06-10 DIAGNOSIS — Z78.0 POSTMENOPAUSAL: Primary | ICD-10-CM

## 2022-06-10 DIAGNOSIS — Z17.0 CARCINOMA OF LOWER-OUTER QUADRANT OF RIGHT BREAST IN FEMALE, ESTROGEN RECEPTOR POSITIVE (HCC): ICD-10-CM

## 2022-06-10 PROCEDURE — 99243 OFF/OP CNSLTJ NEW/EST LOW 30: CPT | Performed by: INTERNAL MEDICINE

## 2022-06-10 NOTE — PROGRESS NOTES
Outpatient Oncology Care Plan   Problem list:   fatigue   Problems related to:   self care   Interventions:   provided general teaching   Expected outcomes:   understands plan of care   Progress towards outcome: making progress     Education Record   Learner: Patient   Barriers / Limitations: None   Method: Discussion   Outcome: Shows understanding   Comments:  Patient here as an new consult. Denies any fatigue, pain, nausea, weight issues, or recent changes. Here to discuss continuation of care.

## 2022-06-16 ENCOUNTER — TELEPHONE (OUTPATIENT)
Dept: ORTHOPEDICS CLINIC | Facility: CLINIC | Age: 62
End: 2022-06-16

## 2022-06-16 ENCOUNTER — OFFICE VISIT (OUTPATIENT)
Dept: FAMILY MEDICINE CLINIC | Facility: CLINIC | Age: 62
End: 2022-06-16
Payer: COMMERCIAL

## 2022-06-16 ENCOUNTER — LAB ENCOUNTER (OUTPATIENT)
Dept: LAB | Age: 62
End: 2022-06-16
Attending: NURSE PRACTITIONER
Payer: COMMERCIAL

## 2022-06-16 VITALS
WEIGHT: 233 LBS | DIASTOLIC BLOOD PRESSURE: 86 MMHG | BODY MASS INDEX: 37.45 KG/M2 | HEART RATE: 64 BPM | RESPIRATION RATE: 18 BRPM | SYSTOLIC BLOOD PRESSURE: 130 MMHG | OXYGEN SATURATION: 97 % | TEMPERATURE: 97 F | HEIGHT: 65.98 IN

## 2022-06-16 DIAGNOSIS — M25.572 LEFT ANKLE PAIN, UNSPECIFIED CHRONICITY: Primary | ICD-10-CM

## 2022-06-16 DIAGNOSIS — M25.572 ACUTE LEFT ANKLE PAIN: ICD-10-CM

## 2022-06-16 DIAGNOSIS — M25.472 LEFT ANKLE SWELLING: Primary | ICD-10-CM

## 2022-06-16 DIAGNOSIS — M25.472 LEFT ANKLE SWELLING: ICD-10-CM

## 2022-06-16 LAB
BASOPHILS # BLD AUTO: 0.04 X10(3) UL (ref 0–0.2)
BASOPHILS NFR BLD AUTO: 0.5 %
EOSINOPHIL # BLD AUTO: 0.17 X10(3) UL (ref 0–0.7)
EOSINOPHIL NFR BLD AUTO: 2.3 %
ERYTHROCYTE [DISTWIDTH] IN BLOOD BY AUTOMATED COUNT: 13.5 %
HCT VFR BLD AUTO: 45.3 %
HGB BLD-MCNC: 14.5 G/DL
IMM GRANULOCYTES # BLD AUTO: 0.02 X10(3) UL (ref 0–1)
IMM GRANULOCYTES NFR BLD: 0.3 %
LYMPHOCYTES # BLD AUTO: 2.02 X10(3) UL (ref 1–4)
LYMPHOCYTES NFR BLD AUTO: 27.5 %
MCH RBC QN AUTO: 29.8 PG (ref 26–34)
MCHC RBC AUTO-ENTMCNC: 32 G/DL (ref 31–37)
MCV RBC AUTO: 93 FL
MONOCYTES # BLD AUTO: 0.44 X10(3) UL (ref 0.1–1)
MONOCYTES NFR BLD AUTO: 6 %
NEUTROPHILS # BLD AUTO: 4.65 X10 (3) UL (ref 1.5–7.7)
NEUTROPHILS # BLD AUTO: 4.65 X10(3) UL (ref 1.5–7.7)
NEUTROPHILS NFR BLD AUTO: 63.4 %
PLATELET # BLD AUTO: 197 10(3)UL (ref 150–450)
RBC # BLD AUTO: 4.87 X10(6)UL
URATE SERPL-MCNC: 5.9 MG/DL
WBC # BLD AUTO: 7.3 X10(3) UL (ref 4–11)

## 2022-06-16 PROCEDURE — 3008F BODY MASS INDEX DOCD: CPT | Performed by: NURSE PRACTITIONER

## 2022-06-16 PROCEDURE — 3075F SYST BP GE 130 - 139MM HG: CPT | Performed by: NURSE PRACTITIONER

## 2022-06-16 PROCEDURE — 36415 COLL VENOUS BLD VENIPUNCTURE: CPT

## 2022-06-16 PROCEDURE — 84550 ASSAY OF BLOOD/URIC ACID: CPT

## 2022-06-16 PROCEDURE — 85025 COMPLETE CBC W/AUTO DIFF WBC: CPT

## 2022-06-16 PROCEDURE — 3079F DIAST BP 80-89 MM HG: CPT | Performed by: NURSE PRACTITIONER

## 2022-06-16 PROCEDURE — 99214 OFFICE O/P EST MOD 30 MIN: CPT | Performed by: NURSE PRACTITIONER

## 2022-06-16 NOTE — PATIENT INSTRUCTIONS
Rest, ice friction rubs, elevate, Take Ibuprofen 600mg three times a day with food, or aleve 2 pills twice a day with food.     Follow up with orthopaedics     Will check blood work today

## 2022-07-06 ENCOUNTER — OFFICE VISIT (OUTPATIENT)
Dept: FAMILY MEDICINE CLINIC | Facility: CLINIC | Age: 62
End: 2022-07-06
Payer: COMMERCIAL

## 2022-07-06 VITALS
WEIGHT: 230 LBS | DIASTOLIC BLOOD PRESSURE: 72 MMHG | SYSTOLIC BLOOD PRESSURE: 132 MMHG | BODY MASS INDEX: 36.96 KG/M2 | HEIGHT: 66 IN | RESPIRATION RATE: 18 BRPM | OXYGEN SATURATION: 98 % | HEART RATE: 74 BPM | TEMPERATURE: 97 F

## 2022-07-06 DIAGNOSIS — J06.9 VIRAL UPPER RESPIRATORY TRACT INFECTION: Primary | ICD-10-CM

## 2022-07-06 DIAGNOSIS — H66.91 OM (OTITIS MEDIA), RECURRENT, RIGHT: ICD-10-CM

## 2022-07-06 DIAGNOSIS — R05.9 COUGH: ICD-10-CM

## 2022-07-06 PROCEDURE — 3078F DIAST BP <80 MM HG: CPT | Performed by: NURSE PRACTITIONER

## 2022-07-06 PROCEDURE — 99214 OFFICE O/P EST MOD 30 MIN: CPT | Performed by: NURSE PRACTITIONER

## 2022-07-06 PROCEDURE — 3008F BODY MASS INDEX DOCD: CPT | Performed by: NURSE PRACTITIONER

## 2022-07-06 PROCEDURE — 3075F SYST BP GE 130 - 139MM HG: CPT | Performed by: NURSE PRACTITIONER

## 2022-07-06 RX ORDER — BENZONATATE 100 MG/1
100 CAPSULE ORAL 3 TIMES DAILY PRN
Qty: 12 CAPSULE | Refills: 0 | Status: SHIPPED | OUTPATIENT
Start: 2022-07-06

## 2022-07-06 RX ORDER — CEFDINIR 300 MG/1
300 CAPSULE ORAL 2 TIMES DAILY
Qty: 20 CAPSULE | Refills: 0 | Status: SHIPPED | OUTPATIENT
Start: 2022-07-06

## 2022-07-06 NOTE — PATIENT INSTRUCTIONS
covid 19 test today, self isolate until results available.   Continue coricidin and flonase    Cefdinir 300mg twice a day for right ear; may not help with nasal symptoms (treating separate finding)    Tessalon 1 every 8 hours if needed for cough  Steam, humidifier, fluids, coricidin products

## 2022-07-07 ENCOUNTER — TELEPHONE (OUTPATIENT)
Dept: FAMILY MEDICINE CLINIC | Facility: CLINIC | Age: 62
End: 2022-07-07

## 2022-07-07 LAB — SARS-COV-2 RNA RESP QL NAA+PROBE: DETECTED

## 2022-07-07 NOTE — TELEPHONE ENCOUNTER
Patient informed of the below results. Patient states she started the medication yesterday as prescribed and then went home and slept. Patient states her cough is better and today she has a lot more energy. Patient states she never really knew her ear was infected but once she found out, she could \"kind of feel it. \"  Patient states overall she is feeling pretty good. Please advise.

## 2022-07-07 NOTE — TELEPHONE ENCOUNTER
Symptoms started 07/04/2022, which would be day 0 of symptom onset. Stay isolated for 5 days, if on day six and no fever, symptoms generally improving, and no diarrhea the last 48 hours then can come off isolation with wearing N95 mask through day 11. Stay hydrated, deep breathing exercises ten times an hour, continue symptomatic treatment. Monitor oxygen levels, if short of breath or oxygen 90% or below then ER.

## 2022-08-12 RX ORDER — ROSUVASTATIN CALCIUM 10 MG/1
10 TABLET, COATED ORAL NIGHTLY
Qty: 90 TABLET | Refills: 0 | Status: SHIPPED | OUTPATIENT
Start: 2022-08-12

## 2022-08-12 RX ORDER — EZETIMIBE 10 MG/1
10 TABLET ORAL NIGHTLY
Qty: 90 TABLET | Refills: 0 | Status: SHIPPED | OUTPATIENT
Start: 2022-08-12

## 2022-08-12 NOTE — TELEPHONE ENCOUNTER
Pt is due for appointment for labs. Patient is due for labs lipids, cmp and ck     Please have pt  schedule apptointment according to my follow up guidelines. Refills until appointmentt ok.

## 2022-08-12 NOTE — TELEPHONE ENCOUNTER
Hold pending patient confirmation of preferred pharmacy. Future appt: Your appointments     Date & Time Appointment Department [facility])    Aug 17, 2022 10:35 AM CDT XR ANKLE (MIN 3 VIEW) LEFT with NAP XR 26[facility] (Ank 78)        Aug 17, 2022 11:00 AM CDT Exam - New Patient with Magali Flor., DPM [facility] - Orthopedics [facility] AND [facility])        Sep 14, 2022  2:20 PM CDT Follow Up Visit with Faraz Ledbetter  Lupe Sparrow ([place] [facility])            [facility] [address]  [address] [place] [address]  [phone] 501 [facility]  [facility] [phone]  [phone]        Last Appointment with provider:   3/21/2022 for MEI. 3/16/22 for annual physical.  Last appointment at [facility]:  7/6/2022  Cholesterol, Total (mg/dL)   Date Value   02/28/2022 157   08/16/2017 212     Total Cholesterol (mg/dL)   Date Value   08/15/2018 225 (H)     HDL Cholesterol (mg/dL)   Date Value   02/28/2022 56   08/15/2018 57     LDL Cholesterol Calc (mg/dL)   Date Value   08/15/2018 125 (H)     LDL Cholesterol (mg/dL)   Date Value   02/28/2022 74     Triglycerides (mg/dL)   Date Value   02/28/2022 160 (H)   08/15/2018 213 (H)     Lab Results   Component Value Date    A1C 5.3 02/27/2020     Lab Results   Component Value Date    T4F 0.8 02/28/2022    TSH 0.694 02/28/2022    TSH 0.613 02/28/2022       No follow-ups on file.

## 2022-08-12 NOTE — TELEPHONE ENCOUNTER
Need refills on her Ezetimibe 10mg, Rosuvastatin 10mg. Pharmacy: [facility].   Right now she is in [address]   Date Time Provider Katie Deepika   8/17/2022 10:35 AM NAP XR RM1 NAP XRAY [facility] [place]   8/17/2022 11:00 AM Bren Santacruz DPM [facility]   9/14/2022  2:20 PM Maribel Issa MD [facility]

## 2022-08-17 ENCOUNTER — HOSPITAL ENCOUNTER (OUTPATIENT)
Dept: GENERAL RADIOLOGY | Age: 62
Discharge: HOME | End: 2022-08-17
Attending: PODIATRIST
Payer: COMMERCIAL

## 2022-08-17 ENCOUNTER — OFFICE VISIT (OUTPATIENT)
Dept: ORTHOPEDICS CLINIC | Facility: CLINIC | Age: 62
End: 2022-08-17
Payer: COMMERCIAL

## 2022-08-17 VITALS — BODY MASS INDEX: 36.64 KG/M2 | WEIGHT: 228 LBS | HEIGHT: 66 IN

## 2022-08-17 DIAGNOSIS — G89.29 CHRONIC PAIN OF LEFT ANKLE: Primary | ICD-10-CM

## 2022-08-17 DIAGNOSIS — G62.9 NEUROPATHY: ICD-10-CM

## 2022-08-17 DIAGNOSIS — M25.572 LEFT ANKLE PAIN, UNSPECIFIED CHRONICITY: ICD-10-CM

## 2022-08-17 DIAGNOSIS — M25.572 CHRONIC PAIN OF LEFT ANKLE: Primary | ICD-10-CM

## 2022-08-17 DIAGNOSIS — M19.079 ARTHRITIS, MIDFOOT: ICD-10-CM

## 2022-08-17 PROCEDURE — 3008F BODY MASS INDEX DOCD: CPT | Performed by: PODIATRIST

## 2022-08-17 PROCEDURE — 99203 OFFICE O/P NEW LOW 30 MIN: CPT | Performed by: PODIATRIST

## 2022-08-17 PROCEDURE — 73610 X-RAY EXAM OF ANKLE: CPT | Performed by: PODIATRIST

## 2022-09-09 ENCOUNTER — TELEPHONE (OUTPATIENT)
Dept: FAMILY MEDICINE CLINIC | Facility: CLINIC | Age: 62
End: 2022-09-09

## 2022-09-09 NOTE — TELEPHONE ENCOUNTER
Referral request for CPAP equipment with patient's IHP received from [facility]. Last sleep follow up was 3/21/22. Wonderloop message sent to patient to schedule sleep follow up appointment.

## 2022-09-14 ENCOUNTER — OFFICE VISIT (OUTPATIENT)
Dept: ORTHOPEDICS CLINIC | Facility: CLINIC | Age: 62
End: 2022-09-14
Payer: COMMERCIAL

## 2022-09-14 ENCOUNTER — TELEPHONE (OUTPATIENT)
Dept: FAMILY MEDICINE CLINIC | Facility: CLINIC | Age: 62
End: 2022-09-14

## 2022-09-14 VITALS — BODY MASS INDEX: 35.79 KG/M2 | WEIGHT: 228 LBS | HEIGHT: 67 IN

## 2022-09-14 DIAGNOSIS — H93.19 TINNITUS, UNSPECIFIED LATERALITY: Primary | ICD-10-CM

## 2022-09-14 DIAGNOSIS — M17.12 PRIMARY OSTEOARTHRITIS OF LEFT KNEE: Primary | ICD-10-CM

## 2022-09-14 PROCEDURE — 3008F BODY MASS INDEX DOCD: CPT | Performed by: ORTHOPAEDIC SURGERY

## 2022-09-14 PROCEDURE — 20610 DRAIN/INJ JOINT/BURSA W/O US: CPT | Performed by: ORTHOPAEDIC SURGERY

## 2022-09-14 RX ORDER — TRIAMCINOLONE ACETONIDE 40 MG/ML
40 INJECTION, SUSPENSION INTRA-ARTICULAR; INTRAMUSCULAR ONCE
Status: COMPLETED | OUTPATIENT
Start: 2022-09-14 | End: 2022-09-14

## 2022-09-14 RX ADMIN — TRIAMCINOLONE ACETONIDE 40 MG: 40 INJECTION, SUSPENSION INTRA-ARTICULAR; INTRAMUSCULAR at 14:37:00

## 2022-09-14 NOTE — PROCEDURES
After informed consent, the patient's left knee was marked, locally anesthetized with skin refrigerant, prepped with topical antiseptic, and injected with a mixture of 1mL 40mg/mL Kenalog, 2mL 1% lidocaine and 2mL 0.5% marcaine through the inferolateral portal.  A band-aid was applied. The patient tolerated the procedure well.     Trung Khalil MD, [address] [facility]  Phone [phone]  Fax [phone]

## 2022-09-15 ENCOUNTER — TELEPHONE (OUTPATIENT)
Dept: FAMILY MEDICINE CLINIC | Facility: CLINIC | Age: 62
End: 2022-09-15

## 2022-09-15 DIAGNOSIS — M25.569 KNEE PAIN, UNSPECIFIED CHRONICITY, UNSPECIFIED LATERALITY: Primary | ICD-10-CM

## 2022-09-16 ENCOUNTER — TELEMEDICINE (OUTPATIENT)
Dept: FAMILY MEDICINE CLINIC | Facility: CLINIC | Age: 62
End: 2022-09-16
Payer: COMMERCIAL

## 2022-09-16 DIAGNOSIS — G47.33 OBSTRUCTIVE SLEEP APNEA SYNDROME: Primary | ICD-10-CM

## 2022-09-16 PROCEDURE — 99213 OFFICE O/P EST LOW 20 MIN: CPT | Performed by: NURSE PRACTITIONER

## 2022-09-16 RX ORDER — GARLIC EXTRACT 500 MG
1 CAPSULE ORAL DAILY
COMMUNITY

## 2022-09-20 RX ORDER — FLUTICASONE PROPIONATE 50 MCG
2 SPRAY, SUSPENSION (ML) NASAL DAILY
Qty: 9.9 ML | Refills: 5 | Status: SHIPPED | OUTPATIENT
Start: 2022-09-20

## 2022-09-20 NOTE — TELEPHONE ENCOUNTER
Future appt: Your appointments     Date & Time Appointment Department [facility])    Dec 13, 2022  9:00 AM CST Physical - Established with Sara Steven MD [address] [facility] ([facility])            25 [facility]  [phone]        Last Appointment with provider:  9/16/22 telemed for MEI. Last appointment at [facility]:  7/6/2022  Cholesterol, Total (mg/dL)   Date Value   02/28/2022 157   08/16/2017 212     Total Cholesterol (mg/dL)   Date Value   08/15/2018 225 (H)     HDL Cholesterol (mg/dL)   Date Value   02/28/2022 56   08/15/2018 57     LDL Cholesterol Calc (mg/dL)   Date Value   08/15/2018 125 (H)     LDL Cholesterol (mg/dL)   Date Value   02/28/2022 74     Triglycerides (mg/dL)   Date Value   02/28/2022 160 (H)   08/15/2018 213 (H)     Lab Results   Component Value Date    A1C 5.3 02/27/2020     Lab Results   Component Value Date    T4F 0.8 02/28/2022    TSH 0.694 02/28/2022    TSH 0.613 02/28/2022       No follow-ups on file.

## 2022-10-10 ENCOUNTER — TELEPHONE (OUTPATIENT)
Dept: FAMILY MEDICINE CLINIC | Facility: CLINIC | Age: 62
End: 2022-10-10

## 2022-10-10 RX ORDER — ROSUVASTATIN CALCIUM 10 MG/1
10 TABLET, COATED ORAL NIGHTLY
Qty: 90 TABLET | Refills: 0 | Status: SHIPPED | OUTPATIENT
Start: 2022-10-10

## 2022-10-10 NOTE — TELEPHONE ENCOUNTER
Future appt: Your appointments     Date & Time Appointment Department [facility])    Oct 11, 2022  1:00 PM CDT Exam - Established with Kiko Hernández, [address], Salima Anderson [facility] AND [facility] Group Salima Sour)        Oct 19, 2022 10:15 AM CDT Exam - Established with EM[facility] Trung CONNOR 66. Group, [address], [place] ([facility] [place])        Oct 19, 2022 10:30 AM CDT Exam - Established with MD Socrates Simon 26, [address], [facility] AND [facility])        Dec 13, 2022  9:00 AM CST Physical - Established with Gisela Ramírez MD 25 [facility], [place] Sour ([facility])            [address] [id number]  [address] [place] [address] 65 Group, [address], 1493 [facility] [address]  [address]  355 [facility], [facility] [address], [facility]3 [address]  [address]  [phone]        Last Appointment with provider:  3/16/22 Physical  [facility]  7/6/2022  Cholesterol, Total (mg/dL)   Date Value   02/28/2022 157   08/16/2017 212     Total Cholesterol (mg/dL)   Date Value   08/15/2018 225 (H)     HDL Cholesterol (mg/dL)   Date Value   02/28/2022 56   08/15/2018 57     LDL Cholesterol Calc (mg/dL)   Date Value   08/15/2018 125 (H)     LDL Cholesterol (mg/dL)   Date Value   02/28/2022 74     Triglycerides (mg/dL)   Date Value   02/28/2022 160 (H)   08/15/2018 213 (H)     Lab Results   Component Value Date    A1C 5.3 02/27/2020     Lab Results   Component Value Date    T4F 0.8 02/28/2022    TSH 0.694 02/28/2022    TSH 0.613 02/28/2022       No follow-ups on file.

## 2022-10-10 NOTE — TELEPHONE ENCOUNTER
Pt states having uterine cramping and some aching, some spotting for about a week. Not due until Dec for Pap.      Please advise

## 2022-10-10 NOTE — TELEPHONE ENCOUNTER
Pt states she has been having uterine cramping and spotting would like to be evaluated. Scheduled appt with provider.    Future Appointments   Date Time Provider Katie Poe   10/11/2022  1:00 PM CHAVA Hebert [facility] [place] [place]   10/19/2022 10:15 AM EM[facility] AUDIO Lonia Phlegm [id number]   10/19/2022 10:30 AM Chuck Ortiz MD [facility]   12/13/2022  9:00 AM Sonia Pires MD [facility] [place] [place]

## 2022-10-11 ENCOUNTER — OFFICE VISIT (OUTPATIENT)
Dept: FAMILY MEDICINE CLINIC | Facility: CLINIC | Age: 62
End: 2022-10-11
Payer: COMMERCIAL

## 2022-10-11 VITALS
HEIGHT: 67 IN | SYSTOLIC BLOOD PRESSURE: 132 MMHG | WEIGHT: 234 LBS | OXYGEN SATURATION: 95 % | DIASTOLIC BLOOD PRESSURE: 80 MMHG | TEMPERATURE: 98 F | RESPIRATION RATE: 18 BRPM | HEART RATE: 77 BPM | BODY MASS INDEX: 36.73 KG/M2

## 2022-10-11 DIAGNOSIS — N81.4 UTERINE PROLAPSE: ICD-10-CM

## 2022-10-11 DIAGNOSIS — Z12.4 SCREENING FOR MALIGNANT NEOPLASM OF CERVIX: ICD-10-CM

## 2022-10-11 DIAGNOSIS — Z85.3 HISTORY OF BREAST CANCER: ICD-10-CM

## 2022-10-11 DIAGNOSIS — N93.8 DYSFUNCTIONAL UTERINE BLEEDING: Primary | ICD-10-CM

## 2022-10-11 DIAGNOSIS — K62.3 RECTAL PROLAPSE: ICD-10-CM

## 2022-10-11 LAB
BILIRUB UR QL STRIP.AUTO: NEGATIVE
COLOR UR AUTO: YELLOW
GLUCOSE UR STRIP.AUTO-MCNC: NEGATIVE MG/DL
KETONES UR STRIP.AUTO-MCNC: NEGATIVE MG/DL
LEUKOCYTE ESTERASE UR QL STRIP.AUTO: NEGATIVE
NITRITE UR QL STRIP.AUTO: NEGATIVE
PH UR STRIP.AUTO: 6 [PH] (ref 5–8)
PROT UR STRIP.AUTO-MCNC: 30 MG/DL
RBC UR QL AUTO: NEGATIVE
SP GR UR STRIP.AUTO: 1.02 (ref 1–1.03)
UROBILINOGEN UR STRIP.AUTO-MCNC: <2 MG/DL

## 2022-10-11 PROCEDURE — 87660 TRICHOMONAS VAGIN DIR PROBE: CPT | Performed by: NURSE PRACTITIONER

## 2022-10-11 PROCEDURE — 87480 CANDIDA DNA DIR PROBE: CPT | Performed by: NURSE PRACTITIONER

## 2022-10-11 PROCEDURE — 81001 URINALYSIS AUTO W/SCOPE: CPT | Performed by: NURSE PRACTITIONER

## 2022-10-11 PROCEDURE — 87624 HPV HI-RISK TYP POOLED RSLT: CPT | Performed by: NURSE PRACTITIONER

## 2022-10-11 PROCEDURE — 3075F SYST BP GE 130 - 139MM HG: CPT | Performed by: NURSE PRACTITIONER

## 2022-10-11 PROCEDURE — 99214 OFFICE O/P EST MOD 30 MIN: CPT | Performed by: NURSE PRACTITIONER

## 2022-10-11 PROCEDURE — 3008F BODY MASS INDEX DOCD: CPT | Performed by: NURSE PRACTITIONER

## 2022-10-11 PROCEDURE — 87510 GARDNER VAG DNA DIR PROBE: CPT | Performed by: NURSE PRACTITIONER

## 2022-10-11 PROCEDURE — 3079F DIAST BP 80-89 MM HG: CPT | Performed by: NURSE PRACTITIONER

## 2022-10-11 NOTE — PATIENT INSTRUCTIONS
Urine today, vaginal swab today  PAP and HPV today  Schedule pelvic ultrasound  Dr. Vika Hawkins (or associate) consult, call to schedule

## 2022-10-12 ENCOUNTER — TELEPHONE (OUTPATIENT)
Facility: LOCATION | Age: 62
End: 2022-10-12

## 2022-10-12 PROBLEM — N93.8 DYSFUNCTIONAL UTERINE BLEEDING: Status: ACTIVE | Noted: 2022-10-12

## 2022-10-12 LAB — HPV I/H RISK 1 DNA SPEC QL NAA+PROBE: NEGATIVE

## 2022-10-19 ENCOUNTER — HOSPITAL ENCOUNTER (OUTPATIENT)
Dept: ULTRASOUND IMAGING | Age: 62
Discharge: HOME | End: 2022-10-19
Attending: NURSE PRACTITIONER
Payer: COMMERCIAL

## 2022-10-19 DIAGNOSIS — K62.3 RECTAL PROLAPSE: ICD-10-CM

## 2022-10-19 DIAGNOSIS — N81.4 UTERINE PROLAPSE: ICD-10-CM

## 2022-10-19 DIAGNOSIS — N93.8 DYSFUNCTIONAL UTERINE BLEEDING: ICD-10-CM

## 2022-10-19 DIAGNOSIS — Z85.3 HISTORY OF BREAST CANCER: ICD-10-CM

## 2022-10-19 PROCEDURE — 76830 TRANSVAGINAL US NON-OB: CPT | Performed by: NURSE PRACTITIONER

## 2022-10-19 PROCEDURE — 76856 US EXAM PELVIC COMPLETE: CPT | Performed by: NURSE PRACTITIONER

## 2022-11-02 ENCOUNTER — OFFICE VISIT (OUTPATIENT)
Facility: LOCATION | Age: 62
End: 2022-11-02
Payer: COMMERCIAL

## 2022-11-02 DIAGNOSIS — H93.293 ABNORMAL AUDITORY PERCEPTION OF BOTH EARS: Primary | ICD-10-CM

## 2022-11-02 DIAGNOSIS — H93.13 TINNITUS OF BOTH EARS: Primary | ICD-10-CM

## 2022-11-02 PROCEDURE — 92567 TYMPANOMETRY: CPT | Performed by: AUDIOLOGIST

## 2022-11-02 PROCEDURE — 99213 OFFICE O/P EST LOW 20 MIN: CPT | Performed by: OTOLARYNGOLOGY

## 2022-11-02 PROCEDURE — 92557 COMPREHENSIVE HEARING TEST: CPT | Performed by: AUDIOLOGIST

## 2022-11-02 NOTE — PROGRESS NOTES
Anna Granados is a 58year old female. Patient presents with:  Ear Problem    HPI:   She has a history of tinnitus. It started after receiving COVID-vaccine over 1-1/2 years ago. It is persistent. She has to have the TV on at night in order to fall asleep. She had 1 ear infection this summer. REVIEW OF SYSTEMS:   GENERAL HEALTH: feels well otherwise  GENERAL : denies fever, chills, sweats, weight loss, weight gain  SKIN: denies any unusual skin lesions or rashes  RESPIRATORY: denies shortness of breath with exertion  NEURO: denies headaches    EXAM:   There were no vitals taken for this visit. System Findings Details   Constitutional  Overall appearance - Normal.   Psychiatric  Orientation - Oriented to time, place, person & situation. Appropriate mood and affect. Head/Face  Facial features -- Normal. Skull - Normal.   Eyes  Pupils equal ,round ,react to light and accomidate   Ears, Nose, Throat, Neck   ears clear no fluid wax or infection. Neurological  Memory - Normal. Cranial nerves - Cranial nerves II through XII grossly intact. Lymph Detail  Submental. Submandibular. Anterior cervical. Posterior cervical. Supraclavicular. ASSESSMENT AND PLAN:   1. Tinnitus of both ears  Audiogram was reviewed which shows a high-frequency hearing loss bilaterally which is stable. There is no proven medication for tinnitus. Is reasonable to consider acupuncture therapy and she will see if this is available through [facility] or [facility] pain management specialist.  The other option is to see neuro otology through [facility] or [place]. She does not need another follow-up audiogram for 2 or 3 years. The patient indicates understanding of these issues and agrees to the plan. No follow-ups on file.     Marleny Llanes MD  11/2/2022  10:28 AM

## 2022-11-02 NOTE — PROGRESS NOTES
Bear Yap was seen for audiometric evaluation today. Referred back to physician.      Fabienne Connolly

## 2022-11-02 NOTE — PATIENT INSTRUCTIONS
[facility] Anesthesiology  [facility] pain management   Consider neurootology evaluation

## 2022-11-04 ENCOUNTER — TELEPHONE (OUTPATIENT)
Dept: FAMILY MEDICINE CLINIC | Facility: CLINIC | Age: 62
End: 2022-11-04

## 2022-11-04 DIAGNOSIS — H91.93 BILATERAL HEARING LOSS, UNSPECIFIED HEARING LOSS TYPE: Primary | ICD-10-CM

## 2022-11-04 DIAGNOSIS — T50.B95A ADVERSE EFFECT OF MRNA COVID-19 VACCINE: ICD-10-CM

## 2022-11-04 DIAGNOSIS — H93.13 TINNITUS OF BOTH EARS: ICD-10-CM

## 2022-11-04 NOTE — TELEPHONE ENCOUNTER
Would be willing to send to neuro, but would need the name of whom she would want to see if there is no one in network that does this type of medicine.

## 2022-11-04 NOTE — TELEPHONE ENCOUNTER
Patient was advised by her audiologist to see a neuro audiologist at [facility] in [place] [facility]. Patient is seeking a referral from her primary. Advised patient to check with HMO to determine a provider covered under her plan at [facility].     Please advise if referral can be sent, or if patient needs to be seen here first.

## 2022-11-04 NOTE — TELEPHONE ENCOUNTER
Patient is seeking referral to neuro otologist Dr. Ti Lynn at [place]. Fax number: [phone].

## 2022-11-07 ENCOUNTER — TELEPHONE (OUTPATIENT)
Facility: LOCATION | Age: 62
End: 2022-11-07

## 2022-11-07 NOTE — TELEPHONE ENCOUNTER
Patient wanted to ask you for the specific names of the \"The other option is to see neuro otology through [facility] or [place]" pasted from office note.

## 2022-11-22 ENCOUNTER — OFFICE VISIT (OUTPATIENT)
Facility: CLINIC | Age: 62
End: 2022-11-22
Payer: COMMERCIAL

## 2022-11-22 VITALS
SYSTOLIC BLOOD PRESSURE: 122 MMHG | HEART RATE: 80 BPM | DIASTOLIC BLOOD PRESSURE: 84 MMHG | WEIGHT: 236 LBS | BODY MASS INDEX: 37.04 KG/M2 | HEIGHT: 67 IN

## 2022-11-22 DIAGNOSIS — N81.6 RECTOCELE: ICD-10-CM

## 2022-11-22 DIAGNOSIS — N81.11 CYSTOCELE, MIDLINE: ICD-10-CM

## 2022-11-22 DIAGNOSIS — N93.0 POSTCOITAL BLEEDING: ICD-10-CM

## 2022-11-22 DIAGNOSIS — N95.0 POSTMENOPAUSAL BLEEDING: Primary | ICD-10-CM

## 2022-11-22 DIAGNOSIS — N81.4 UTERINE PROLAPSE: ICD-10-CM

## 2022-11-22 DIAGNOSIS — Z01.812 PRE-PROCEDURAL LABORATORY EXAMINATION: ICD-10-CM

## 2022-11-22 PROBLEM — N93.9 ABNORMAL UTERINE BLEEDING: Status: ACTIVE | Noted: 2022-10-12

## 2022-11-22 PROCEDURE — 88305 TISSUE EXAM BY PATHOLOGIST: CPT | Performed by: OBSTETRICS & GYNECOLOGY

## 2022-11-22 PROCEDURE — 3008F BODY MASS INDEX DOCD: CPT | Performed by: OBSTETRICS & GYNECOLOGY

## 2022-11-22 PROCEDURE — 58100 BIOPSY OF UTERUS LINING: CPT | Performed by: OBSTETRICS & GYNECOLOGY

## 2022-11-22 PROCEDURE — 3074F SYST BP LT 130 MM HG: CPT | Performed by: OBSTETRICS & GYNECOLOGY

## 2022-11-22 PROCEDURE — 99204 OFFICE O/P NEW MOD 45 MIN: CPT | Performed by: OBSTETRICS & GYNECOLOGY

## 2022-11-22 PROCEDURE — 3079F DIAST BP 80-89 MM HG: CPT | Performed by: OBSTETRICS & GYNECOLOGY

## 2022-11-22 RX ORDER — IBUPROFEN 200 MG
600 TABLET ORAL ONCE
Status: COMPLETED | OUTPATIENT
Start: 2022-11-22 | End: 2022-11-22

## 2022-11-22 RX ADMIN — IBUPROFEN 600 MG: 200 MG TABLET ORAL at 16:19:00

## 2022-11-22 NOTE — PATIENT INSTRUCTIONS
Vulvar care basics:    Cleaning:  -use plain warm (not hot) water (no soap) to wash if needed or can take Sitz bath (see below)   -use water, fingers, & only very gentle, fragrance-free, moisturizing cleanser       (e.g. Cetaphil or CeraVe hydrating or gentle cleansers meant for eczema/psoriasis & faces)  -only pat dry gently (no rubbing)    Sitz baths:  -soothing and cleansing  -Get a Sitz Bath from [employer] [facility] [facility] or [employer]--fits over toilet, you can fill with water to soak vulva without having to get in bathtub  -Use 1-3 times per day for ten minutes at a time  -Pat dry, apply thin layer of vaseline to protect the skin    Protection/Prevention:  -goal is to maintain an intact, moist (non-dehydrated) skin barrier   -need to prevent irritation & over-drying of skin that can lead to micro-tears, cracking, and itching, pain, & bleeding.   -do not scratch or wipe hard (mechanical injury)   -avoid strong chemicals/fragrances (fragrance free soap & detergents, avoid fabric softeners)  -avoid other irritants (e.g. sweat, leaked urine, leaked stool)   -avoid excessive friction (no thongs, always use lubricant for intercourse, daily barrier cream or ointment)   -breathable underwear, change underwear if sweaty/wet, no underwear while sleeping if possible. -DO USE a barrier product for protection        (e.g. Aquaphor, vaseline, A&D ointment, Zinc oxide, diaper rash cream) at least once daily  -DO NOT use any instrument (including fingers) in the anus first and then in the vagina. This will cause a bacterial infection of the vagina.      Treatment:  -BEST TREATMENT IS PREVENTION   -ointments are generally more potent than creams  -use just a thin layer  -during treatment (usually at least 2 wk) it is best to avoid intercourse to avoid trauma to the skin   -if diabetic or prone to yeast infections:          Start with internal (vaginal) AND external (vulvar) therapy         Monistat 7 vaginally AND Lotrimin (clotrimazole) twice daily at minimum 14-28 days          Both Monistat & Lotrimin are over the counter - talk to pharmacist if you need help         Often chronic therapy with clotrimazole or Nystatin (prescription) will be needed   -if diagnosed with chronic inflammatory skin condition (e.g. lichen sclerosis)          Most important is avoiding scratching & irritants         Work with gynecologist to form a steroid regimen for long term use         Often will need daily strong topical steroid (prescription) for 6-12 weeks. Best to try to taper down to 2-3 times per week or weekly if able & can increase use in a flare  -if sensitive/dry skin         Add back some oils &/or moisture on a regular basis         Coconut oil is pure (no irritants) & contains ceramides (fatty acids) that are              important for maintaining skin barrier         Hyaluronic acid (there are suppositories for intravaginal use e.g Revaree)         Vaginal moisturizer products can be used topically as well (e.g. Replens, Rosellen Skillern)          Still recommend a barrier product in addition (on top of the above)     If you are tempted to scratch:  -place ice pack on area for 15-20 minutes & distract yourself.   -if needed can (sparingly) place a thin layer of lidocaine ointment or cream         (e.g. Bikini-zone, etc over the counter)  -can take an oral anti-histamine (e.g. Benadryl, Claritin, Zyrtec, etc)   -can also use a topical steroid (hydrocortisone ointment over the counter) for a few days        Take care - chronic steroid use can cause skin thinning & can worsen your problem. Lubricants    Aloe Cadabra  Good Clean Love  Restore  Pre-Seed (targeted for those attempting to conceive)     Please remember that if your condition is not improving, you may need a skin biopsy or   to see a dermatologist. Skin cancer can itch too, so we should always make sure we are   looking out/thinking about that as a possibility as well. Good luck!

## 2022-11-29 NOTE — PROGRESS NOTES
Pt aware of results & recommendations: Recommend work on weight loss. On my exam, there was stage II prolapse of anterior & posterior vaginal walls with foreshortening of the vagina. The prolapse itself is not dangerous and does not need to be fixed unless having difficulty with urination or bowel movements. Given history of ER positive breast cancer would try to avoid vaginal estrogen. If lubricant with intercourse & vaginal moisturizer/hyaluronic acid (Revaree) do not help, would consider hysterectomy with consideration of prolapse repair. Link to the [employer] wesite sent to pt. Verbalized understanding.

## 2022-12-02 PROBLEM — N81.11 CYSTOCELE, MIDLINE: Status: ACTIVE | Noted: 2022-12-02

## 2022-12-02 PROBLEM — N93.0 POSTCOITAL BLEEDING: Status: ACTIVE | Noted: 2022-12-02

## 2022-12-02 PROBLEM — N81.6 RECTOCELE: Status: ACTIVE | Noted: 2022-12-02

## 2022-12-02 NOTE — PROCEDURES
2022    Procedure: Endometrial biopsy    Pre-op Dx: Postmenopausal bleeding     Post-op Dx: Same    Indication: 58year old  female with postmenopausal bleeding     Risks, benefits, alternatives discussed. Speculum placed. Cervix swabbed with betadine x 3   Tenaculum applied to anterior lip of cervix  Cervical canal not stenotic  Pipelle inserted without difficulty to 7 cm  Multiple passes made with small tissue obtained. Pipelle & tenaculum removed  Hemostatic tenaculum sites  Speculum removed.    Tolerated well  EBL minimal  Specimen: endometrial biopsy    Louise Parada MD

## 2022-12-09 ENCOUNTER — TELEPHONE (OUTPATIENT)
Dept: FAMILY MEDICINE CLINIC | Facility: CLINIC | Age: 62
End: 2022-12-09

## 2022-12-09 NOTE — TELEPHONE ENCOUNTER
LM for patient to reschedule online or return call. Patient has physical scheduled on 12/13/22. She is not due for physical until 3/2023.

## 2022-12-13 NOTE — TELEPHONE ENCOUNTER
Patient primarily looking for doctor in network with her new HMO who can do her cortisone injection in her knee.  Advised patient to call HMO and find out who is in network, then call back this office for referral.    Patient also has noticed a light ringin Statement Selected

## 2022-12-27 ENCOUNTER — TELEPHONE (OUTPATIENT)
Dept: FAMILY MEDICINE CLINIC | Facility: CLINIC | Age: 62
End: 2022-12-27

## 2022-12-27 NOTE — TELEPHONE ENCOUNTER
Discussed one-step and quantiferan gold. Patient will wait until she returns from a trip she is taking and get it done upon return. Will discuss at physical on 12/28.     Future Appointments   Date Time Provider Katie Poe   12/28/2022  2:45 PM Colonel Camron Glover APRN [facility]   3/16/2023  9:00 AM Magdalene Burleson MD [facility]

## 2022-12-27 NOTE — TELEPHONE ENCOUNTER
Has an appt tomorrow for px for work.  Needs tb test. Wants to know if can get the one where she gets results right away

## 2022-12-27 NOTE — TELEPHONE ENCOUNTER
Patient is not sure whether the employer requires a one-step or two-step TB skin test. She also will need an employment physical.     Advised patient to confirm type of TB test needed and any paperwork for physical supplied by employer, and then call back with this information and to schedule physical.

## 2022-12-28 ENCOUNTER — TELEPHONE (OUTPATIENT)
Dept: FAMILY MEDICINE CLINIC | Facility: CLINIC | Age: 62
End: 2022-12-28

## 2022-12-28 ENCOUNTER — OFFICE VISIT (OUTPATIENT)
Dept: FAMILY MEDICINE CLINIC | Facility: CLINIC | Age: 62
End: 2022-12-28
Payer: COMMERCIAL

## 2022-12-28 ENCOUNTER — LAB ENCOUNTER (OUTPATIENT)
Dept: LAB | Age: 62
End: 2022-12-28
Attending: NURSE PRACTITIONER
Payer: COMMERCIAL

## 2022-12-28 VITALS
OXYGEN SATURATION: 97 % | RESPIRATION RATE: 16 BRPM | SYSTOLIC BLOOD PRESSURE: 132 MMHG | WEIGHT: 237 LBS | DIASTOLIC BLOOD PRESSURE: 76 MMHG | TEMPERATURE: 98 F | BODY MASS INDEX: 37.2 KG/M2 | HEART RATE: 92 BPM | HEIGHT: 67 IN

## 2022-12-28 DIAGNOSIS — Z76.89 RETURN TO WORK EXAM: ICD-10-CM

## 2022-12-28 DIAGNOSIS — Z76.89 RETURN TO WORK EXAM: Primary | ICD-10-CM

## 2022-12-28 PROCEDURE — 3075F SYST BP GE 130 - 139MM HG: CPT | Performed by: NURSE PRACTITIONER

## 2022-12-28 PROCEDURE — 86480 TB TEST CELL IMMUN MEASURE: CPT

## 2022-12-28 PROCEDURE — 3078F DIAST BP <80 MM HG: CPT | Performed by: NURSE PRACTITIONER

## 2022-12-28 PROCEDURE — 99396 PREV VISIT EST AGE 40-64: CPT | Performed by: NURSE PRACTITIONER

## 2022-12-28 PROCEDURE — 36415 COLL VENOUS BLD VENIPUNCTURE: CPT

## 2022-12-28 PROCEDURE — 3008F BODY MASS INDEX DOCD: CPT | Performed by: NURSE PRACTITIONER

## 2022-12-28 NOTE — TELEPHONE ENCOUNTER
Patient requested a copy of some of her medical records so she can get another social security card. She lost her original one. A copy of her immunization records & 10/11/2022 office notes were given to the patient.

## 2022-12-30 LAB
M TB IFN-G CD4+ T-CELLS BLD-ACNC: 0.03 IU/ML
M TB TUBERC IFN-G BLD QL: NEGATIVE
M TB TUBERC IGNF/MITOGEN IGNF CONTROL: >10 IU/ML
QFT TB1 AG MINUS NIL: 0.01 IU/ML
QFT TB2 AG MINUS NIL: 0 IU/ML

## 2023-01-05 ENCOUNTER — TELEPHONE (OUTPATIENT)
Dept: FAMILY MEDICINE CLINIC | Facility: CLINIC | Age: 63
End: 2023-01-05

## 2023-01-05 NOTE — TELEPHONE ENCOUNTER
Spoke with patient. Form was faxed and a copy will be mailed to the patient. No questions at this time.

## 2023-01-05 NOTE — PATIENT INSTRUCTIONS
Healthy exam    Will complete form when have the QuantiFERON test results. Due for her annual wellness in March. Otherwise follow-up as needed.

## 2023-01-05 NOTE — TELEPHONE ENCOUNTER
Pt wants to know if her TB test results were sent to [facility] in [place]

## 2023-01-09 RX ORDER — ROSUVASTATIN CALCIUM 10 MG/1
TABLET, COATED ORAL
Qty: 90 TABLET | Refills: 0 | Status: SHIPPED | OUTPATIENT
Start: 2023-01-09

## 2023-01-09 NOTE — TELEPHONE ENCOUNTER
Pt is due for appointment. Patient is due for labs:    Please have pt  schedule apptointment according to my follow up guidelines. Refills until appointmentt ok.

## 2023-02-15 NOTE — TELEPHONE ENCOUNTER
Future appt: Your appointments     Date & Time Appointment Department [facility])    Mar 30, 2023  9:20 AM CDT Physical - Established with Wei Thurston MD [address], [place] ([facility])            [address] [place] [address]  [address] [address] [id number]  [phone]        Last Appointment with provider:   12/28/2022work exam  Last appointment at [facility]:  12/28/2022  Cholesterol, Total (mg/dL)   Date Value   02/28/2022 157   08/16/2017 212     Total Cholesterol (mg/dL)   Date Value   08/15/2018 225 (H)     HDL Cholesterol (mg/dL)   Date Value   02/28/2022 56   08/15/2018 57     LDL Cholesterol Calc (mg/dL)   Date Value   08/15/2018 125 (H)     LDL Cholesterol (mg/dL)   Date Value   02/28/2022 74     Triglycerides (mg/dL)   Date Value   02/28/2022 160 (H)   08/15/2018 213 (H)     Lab Results   Component Value Date    A1C 5.3 02/27/2020     Lab Results   Component Value Date    T4F 0.8 02/28/2022    TSH 0.694 02/28/2022    TSH 0.613 02/28/2022       No follow-ups on file.

## 2023-02-16 RX ORDER — EZETIMIBE 10 MG/1
TABLET ORAL
Qty: 90 TABLET | Refills: 0 | Status: SHIPPED | OUTPATIENT
Start: 2023-02-16

## 2023-03-08 ENCOUNTER — TELEPHONE (OUTPATIENT)
Dept: FAMILY MEDICINE CLINIC | Facility: CLINIC | Age: 63
End: 2023-03-08

## 2023-03-08 DIAGNOSIS — E88.81 METABOLIC SYNDROME X: ICD-10-CM

## 2023-03-08 DIAGNOSIS — E78.2 MIXED HYPERLIPIDEMIA: ICD-10-CM

## 2023-03-08 DIAGNOSIS — E53.8 VITAMIN B12 DEFICIENCY: Primary | ICD-10-CM

## 2023-03-15 ENCOUNTER — OFFICE VISIT (OUTPATIENT)
Dept: FAMILY MEDICINE CLINIC | Facility: CLINIC | Age: 63
End: 2023-03-15
Payer: COMMERCIAL

## 2023-03-15 ENCOUNTER — TELEPHONE (OUTPATIENT)
Dept: FAMILY MEDICINE CLINIC | Facility: CLINIC | Age: 63
End: 2023-03-15

## 2023-03-15 VITALS
RESPIRATION RATE: 18 BRPM | WEIGHT: 233 LBS | DIASTOLIC BLOOD PRESSURE: 84 MMHG | TEMPERATURE: 98 F | BODY MASS INDEX: 36.57 KG/M2 | HEART RATE: 76 BPM | HEIGHT: 67 IN | SYSTOLIC BLOOD PRESSURE: 122 MMHG | OXYGEN SATURATION: 96 %

## 2023-03-15 DIAGNOSIS — M25.551 RIGHT HIP PAIN: Primary | ICD-10-CM

## 2023-03-15 DIAGNOSIS — Z96.641 HISTORY OF RIGHT HIP REPLACEMENT: ICD-10-CM

## 2023-03-15 PROCEDURE — 99213 OFFICE O/P EST LOW 20 MIN: CPT | Performed by: NURSE PRACTITIONER

## 2023-03-15 PROCEDURE — 3008F BODY MASS INDEX DOCD: CPT | Performed by: NURSE PRACTITIONER

## 2023-03-15 PROCEDURE — 3074F SYST BP LT 130 MM HG: CPT | Performed by: NURSE PRACTITIONER

## 2023-03-15 PROCEDURE — 3079F DIAST BP 80-89 MM HG: CPT | Performed by: NURSE PRACTITIONER

## 2023-03-15 RX ORDER — CYCLOBENZAPRINE HCL 10 MG
10 TABLET ORAL 3 TIMES DAILY PRN
COMMUNITY
Start: 2023-03-11 | End: 2023-03-15

## 2023-03-15 RX ORDER — CYCLOBENZAPRINE HCL 10 MG
10 TABLET ORAL 3 TIMES DAILY PRN
Qty: 30 TABLET | Refills: 0 | Status: SHIPPED | OUTPATIENT
Start: 2023-03-15

## 2023-03-15 NOTE — TELEPHONE ENCOUNTER
Patient states she had severe hip pain last weekend. She went to the ER on 3/11/23.  She needs to come into the office for a referral.

## 2023-03-16 NOTE — PATIENT INSTRUCTIONS
Refilled cyclobenzaprine. Referral done to ortho. Continue with stretches and moist heat to the area. Follow up if worsens or not improving.

## 2023-03-24 ENCOUNTER — TELEPHONE (OUTPATIENT)
Dept: FAMILY MEDICINE CLINIC | Facility: CLINIC | Age: 63
End: 2023-03-24

## 2023-03-24 DIAGNOSIS — H93.13 TINNITUS OF BOTH EARS: Primary | ICD-10-CM

## 2023-03-24 DIAGNOSIS — H91.93 BILATERAL HEARING LOSS, UNSPECIFIED HEARING LOSS TYPE: ICD-10-CM

## 2023-03-24 NOTE — TELEPHONE ENCOUNTER
Referral for Dr Nam Gould is being requested by Oniel Latif with [facility] ENT - please fax to [phone]

## 2023-03-24 NOTE — TELEPHONE ENCOUNTER
Patient was given a referral to Dr. Kimmy Issa for her chronic tinnitus and hearing loss on 11/12/22. [facility] is seeking a continuation of this referral. Please advise.

## 2023-03-27 ENCOUNTER — TELEPHONE (OUTPATIENT)
Dept: FAMILY MEDICINE CLINIC | Facility: CLINIC | Age: 63
End: 2023-03-27

## 2023-03-27 NOTE — TELEPHONE ENCOUNTER
Mason Romero is seeking an authorization number. The referral shows authorized, but no number. Referred patient to referrals department.

## 2023-03-27 NOTE — TELEPHONE ENCOUNTER
Duplicate. Page forwarded from Dr. Jeniffer Moses, appears was to direct to Dr. Tatum Mendez, needing authorization referral number.

## 2023-03-28 ENCOUNTER — LABORATORY ENCOUNTER (OUTPATIENT)
Dept: LAB | Age: 63
End: 2023-03-28
Attending: FAMILY MEDICINE
Payer: COMMERCIAL

## 2023-03-28 DIAGNOSIS — E53.8 VITAMIN B12 DEFICIENCY: ICD-10-CM

## 2023-03-28 DIAGNOSIS — E88.81 METABOLIC SYNDROME X: ICD-10-CM

## 2023-03-28 DIAGNOSIS — E78.2 MIXED HYPERLIPIDEMIA: ICD-10-CM

## 2023-03-28 DIAGNOSIS — R73.9 ELEVATED SERUM GLUCOSE: ICD-10-CM

## 2023-03-28 LAB
ALBUMIN SERPL-MCNC: 4.2 G/DL (ref 3.4–5)
ALBUMIN/GLOB SERPL: 1.2 {RATIO} (ref 1–2)
ALP LIVER SERPL-CCNC: 99 U/L
ALT SERPL-CCNC: 49 U/L
ANION GAP SERPL CALC-SCNC: 2 MMOL/L (ref 0–18)
AST SERPL-CCNC: 52 U/L (ref 15–37)
BASOPHILS # BLD AUTO: 0.03 X10(3) UL (ref 0–0.2)
BASOPHILS NFR BLD AUTO: 0.4 %
BILIRUB SERPL-MCNC: 0.7 MG/DL (ref 0.1–2)
BILIRUB UR QL STRIP.AUTO: NEGATIVE
BUN BLD-MCNC: 18 MG/DL (ref 7–18)
CALCIUM BLD-MCNC: 9.5 MG/DL (ref 8.5–10.1)
CHLORIDE SERPL-SCNC: 110 MMOL/L (ref 98–112)
CHOLEST SERPL-MCNC: 157 MG/DL (ref ?–200)
CK SERPL-CCNC: 138 U/L
CO2 SERPL-SCNC: 24 MMOL/L (ref 21–32)
COLOR UR AUTO: YELLOW
CREAT BLD-MCNC: 0.82 MG/DL
DEPRECATED HBV CORE AB SER IA-ACNC: 221.1 NG/ML
EOSINOPHIL # BLD AUTO: 0.21 X10(3) UL (ref 0–0.7)
EOSINOPHIL NFR BLD AUTO: 2.8 %
ERYTHROCYTE [DISTWIDTH] IN BLOOD BY AUTOMATED COUNT: 13.5 %
FASTING PATIENT LIPID ANSWER: YES
FASTING STATUS PATIENT QL REPORTED: YES
GFR SERPLBLD BASED ON 1.73 SQ M-ARVRAT: 80 ML/MIN/1.73M2 (ref 60–?)
GLOBULIN PLAS-MCNC: 3.5 G/DL (ref 2.8–4.4)
GLUCOSE BLD-MCNC: 116 MG/DL (ref 70–99)
GLUCOSE UR STRIP.AUTO-MCNC: NEGATIVE MG/DL
HCT VFR BLD AUTO: 44.7 %
HDLC SERPL-MCNC: 52 MG/DL (ref 40–59)
HGB BLD-MCNC: 14.7 G/DL
IMM GRANULOCYTES # BLD AUTO: 0.03 X10(3) UL (ref 0–1)
IMM GRANULOCYTES NFR BLD: 0.4 %
IRON SATN MFR SERPL: 22 %
IRON SERPL-MCNC: 95 UG/DL
KETONES UR STRIP.AUTO-MCNC: NEGATIVE MG/DL
LDLC SERPL CALC-MCNC: 73 MG/DL (ref ?–100)
LYMPHOCYTES # BLD AUTO: 1.94 X10(3) UL (ref 1–4)
LYMPHOCYTES NFR BLD AUTO: 26.3 %
MCH RBC QN AUTO: 30 PG (ref 26–34)
MCHC RBC AUTO-ENTMCNC: 32.9 G/DL (ref 31–37)
MCV RBC AUTO: 91.2 FL
MONOCYTES # BLD AUTO: 0.45 X10(3) UL (ref 0.1–1)
MONOCYTES NFR BLD AUTO: 6.1 %
NEUTROPHILS # BLD AUTO: 4.73 X10 (3) UL (ref 1.5–7.7)
NEUTROPHILS # BLD AUTO: 4.73 X10(3) UL (ref 1.5–7.7)
NEUTROPHILS NFR BLD AUTO: 64 %
NITRITE UR QL STRIP.AUTO: NEGATIVE
NONHDLC SERPL-MCNC: 105 MG/DL (ref ?–130)
OSMOLALITY SERPL CALC.SUM OF ELEC: 285 MOSM/KG (ref 275–295)
PH UR STRIP.AUTO: 5 [PH] (ref 5–8)
PLATELET # BLD AUTO: 280 10(3)UL (ref 150–450)
POTASSIUM SERPL-SCNC: 3.9 MMOL/L (ref 3.5–5.1)
PROT SERPL-MCNC: 7.7 G/DL (ref 6.4–8.2)
PROT UR STRIP.AUTO-MCNC: NEGATIVE MG/DL
RBC # BLD AUTO: 4.9 X10(6)UL
RBC UR QL AUTO: NEGATIVE
SODIUM SERPL-SCNC: 136 MMOL/L (ref 136–145)
SP GR UR STRIP.AUTO: 1.02 (ref 1–1.03)
TIBC SERPL-MCNC: 425 UG/DL (ref 240–450)
TRANSFERRIN SERPL-MCNC: 285 MG/DL (ref 200–360)
TRIGL SERPL-MCNC: 191 MG/DL (ref 30–149)
TSI SER-ACNC: 1.11 MIU/ML (ref 0.36–3.74)
URATE SERPL-MCNC: 5.4 MG/DL
UROBILINOGEN UR STRIP.AUTO-MCNC: <2 MG/DL
VIT B12 SERPL-MCNC: 411 PG/ML (ref 193–986)
VIT D+METAB SERPL-MCNC: 44 NG/ML (ref 30–100)
VLDLC SERPL CALC-MCNC: 29 MG/DL (ref 0–30)
WBC # BLD AUTO: 7.4 X10(3) UL (ref 4–11)

## 2023-03-28 PROCEDURE — 84443 ASSAY THYROID STIM HORMONE: CPT

## 2023-03-28 PROCEDURE — 80061 LIPID PANEL: CPT

## 2023-03-28 PROCEDURE — 87086 URINE CULTURE/COLONY COUNT: CPT

## 2023-03-28 PROCEDURE — 83540 ASSAY OF IRON: CPT

## 2023-03-28 PROCEDURE — 81001 URINALYSIS AUTO W/SCOPE: CPT

## 2023-03-28 PROCEDURE — 82607 VITAMIN B-12: CPT

## 2023-03-28 PROCEDURE — 82306 VITAMIN D 25 HYDROXY: CPT

## 2023-03-28 PROCEDURE — 85025 COMPLETE CBC W/AUTO DIFF WBC: CPT

## 2023-03-28 PROCEDURE — 82728 ASSAY OF FERRITIN: CPT

## 2023-03-28 PROCEDURE — 80053 COMPREHEN METABOLIC PANEL: CPT

## 2023-03-28 PROCEDURE — 82550 ASSAY OF CK (CPK): CPT

## 2023-03-28 PROCEDURE — 36415 COLL VENOUS BLD VENIPUNCTURE: CPT

## 2023-03-28 PROCEDURE — 84550 ASSAY OF BLOOD/URIC ACID: CPT

## 2023-03-28 PROCEDURE — 83036 HEMOGLOBIN GLYCOSYLATED A1C: CPT

## 2023-03-28 PROCEDURE — 83550 IRON BINDING TEST: CPT

## 2023-03-30 ENCOUNTER — OFFICE VISIT (OUTPATIENT)
Dept: FAMILY MEDICINE CLINIC | Facility: CLINIC | Age: 63
End: 2023-03-30
Payer: COMMERCIAL

## 2023-03-30 ENCOUNTER — TELEPHONE (OUTPATIENT)
Dept: FAMILY MEDICINE CLINIC | Facility: CLINIC | Age: 63
End: 2023-03-30

## 2023-03-30 VITALS
DIASTOLIC BLOOD PRESSURE: 80 MMHG | HEART RATE: 76 BPM | OXYGEN SATURATION: 98 % | TEMPERATURE: 98 F | HEIGHT: 65 IN | RESPIRATION RATE: 18 BRPM | BODY MASS INDEX: 37.95 KG/M2 | SYSTOLIC BLOOD PRESSURE: 136 MMHG | WEIGHT: 227.81 LBS

## 2023-03-30 DIAGNOSIS — S76.012D STRAIN OF FLEXOR MUSCLE OF LEFT HIP, SUBSEQUENT ENCOUNTER: ICD-10-CM

## 2023-03-30 DIAGNOSIS — R73.9 ELEVATED SERUM GLUCOSE: Primary | ICD-10-CM

## 2023-03-30 DIAGNOSIS — E04.1 THYROID NODULE: ICD-10-CM

## 2023-03-30 DIAGNOSIS — R73.03 PREDIABETES: ICD-10-CM

## 2023-03-30 DIAGNOSIS — R79.89 ELEVATED LFTS: ICD-10-CM

## 2023-03-30 DIAGNOSIS — E04.1 THYROID NODULE GREATER THAN OR EQUAL TO 1 CM IN DIAMETER INCIDENTALLY NOTED ON IMAGING STUDY: ICD-10-CM

## 2023-03-30 DIAGNOSIS — E53.8 VITAMIN B12 DEFICIENCY: ICD-10-CM

## 2023-03-30 DIAGNOSIS — Z12.11 COLON CANCER SCREENING: ICD-10-CM

## 2023-03-30 DIAGNOSIS — E88.81 METABOLIC SYNDROME X: ICD-10-CM

## 2023-03-30 DIAGNOSIS — E66.09 CLASS 2 OBESITY DUE TO EXCESS CALORIES WITHOUT SERIOUS COMORBIDITY WITH BODY MASS INDEX (BMI) OF 37.0 TO 37.9 IN ADULT: ICD-10-CM

## 2023-03-30 DIAGNOSIS — Z00.00 WELLNESS EXAMINATION: Primary | ICD-10-CM

## 2023-03-30 PROBLEM — E66.01 MORBID (SEVERE) OBESITY DUE TO EXCESS CALORIES (HCC): Status: ACTIVE | Noted: 2023-03-30

## 2023-03-30 LAB
EST. AVERAGE GLUCOSE BLD GHB EST-MCNC: 137 MG/DL (ref 68–126)
HBA1C MFR BLD: 6.4 % (ref ?–5.7)

## 2023-03-30 PROCEDURE — 99213 OFFICE O/P EST LOW 20 MIN: CPT | Performed by: FAMILY MEDICINE

## 2023-03-30 PROCEDURE — 93000 ELECTROCARDIOGRAM COMPLETE: CPT | Performed by: FAMILY MEDICINE

## 2023-03-30 PROCEDURE — 99396 PREV VISIT EST AGE 40-64: CPT | Performed by: FAMILY MEDICINE

## 2023-03-30 PROCEDURE — 87624 HPV HI-RISK TYP POOLED RSLT: CPT | Performed by: FAMILY MEDICINE

## 2023-03-30 PROCEDURE — 90471 IMMUNIZATION ADMIN: CPT | Performed by: FAMILY MEDICINE

## 2023-03-30 PROCEDURE — 3079F DIAST BP 80-89 MM HG: CPT | Performed by: FAMILY MEDICINE

## 2023-03-30 PROCEDURE — 90670 PCV13 VACCINE IM: CPT | Performed by: FAMILY MEDICINE

## 2023-03-30 PROCEDURE — 3008F BODY MASS INDEX DOCD: CPT | Performed by: FAMILY MEDICINE

## 2023-03-30 PROCEDURE — 3075F SYST BP GE 130 - 139MM HG: CPT | Performed by: FAMILY MEDICINE

## 2023-03-30 RX ORDER — MELOXICAM 15 MG/1
1 TABLET ORAL DAILY
COMMUNITY
Start: 2023-03-29

## 2023-03-30 RX ORDER — EZETIMIBE 10 MG/1
10 TABLET ORAL NIGHTLY
Qty: 90 TABLET | Refills: 0 | Status: SHIPPED | OUTPATIENT
Start: 2023-03-30

## 2023-03-30 RX ORDER — ROSUVASTATIN CALCIUM 10 MG/1
10 TABLET, COATED ORAL NIGHTLY
Qty: 90 TABLET | Refills: 0 | Status: SHIPPED | OUTPATIENT
Start: 2023-03-30

## 2023-03-30 RX ORDER — MAGNESIUM OXIDE 400 MG/1
400 TABLET ORAL DAILY
COMMUNITY

## 2023-03-30 NOTE — PATIENT INSTRUCTIONS
Cholesterol looks good. Sugar is high in the prediabetic zone. prediabetes:  recommend patient see diabetic ed and start to check sugars four times per day and to recheck in 1 month with appt. Add AIC. AST is increasing. Recommend liver US . Screen for hepatitis a, b,and c.   Urine is contaminated with no growth   Thyroid looks ok    Vitamin B12 is subtherapeutic.   recommend \"B complex\" every other day and recheck level in 6 months   Iron is better ok to stop supplementation. Vitamin D is good.    Continue present managment

## 2023-03-30 NOTE — TELEPHONE ENCOUNTER
----- Message from Chris Montero MD sent at 3/30/2023 11:12 AM CDT -----  This is in the diabetic range,   Decrease carbohydrates in diet,   Test bid; before and 2 hours after meals. Recheck in 1 month with numbers.

## 2023-03-30 NOTE — TELEPHONE ENCOUNTER
----- Message from Kurt Mazariegos MD sent at 3/30/2023  8:48 AM CDT -----  Cholesterol looks good. Sugar is high in the prediabetic zone. prediabetes:  recommend patient see diabetic ed and start to check sugars four times per day and to recheck in 1 month with appt. Add AIC. AST is increasing. Recommend liver US . Screen for hepatitis a, b,and c.   Urine is contaminated with no growth   Thyroid looks ok    Vitamin B12 is subtherapeutic.   recommend \"B complex\" every other day and recheck level in 6 months   Iron is better ok to stop supplementation. Vitamin D is good.    Continue present managment

## 2023-03-31 LAB — HPV I/H RISK 1 DNA SPEC QL NAA+PROBE: NEGATIVE

## 2023-03-31 NOTE — TELEPHONE ENCOUNTER
Patient notified, agreeable with plan. Will contact office if having any difficulty obtaining the ordered testing supplies from [facility]

## 2023-04-07 ENCOUNTER — HOSPITAL ENCOUNTER (OUTPATIENT)
Dept: MAMMOGRAPHY | Age: 63
Discharge: HOME | End: 2023-04-07
Attending: INTERNAL MEDICINE
Payer: COMMERCIAL

## 2023-04-07 DIAGNOSIS — C50.511 CARCINOMA OF LOWER-OUTER QUADRANT OF RIGHT BREAST IN FEMALE, ESTROGEN RECEPTOR POSITIVE (HCC): ICD-10-CM

## 2023-04-07 DIAGNOSIS — Z17.0 CARCINOMA OF LOWER-OUTER QUADRANT OF RIGHT BREAST IN FEMALE, ESTROGEN RECEPTOR POSITIVE (HCC): ICD-10-CM

## 2023-04-07 PROCEDURE — 77063 BREAST TOMOSYNTHESIS BI: CPT | Performed by: INTERNAL MEDICINE

## 2023-04-07 PROCEDURE — 77067 SCR MAMMO BI INCL CAD: CPT | Performed by: INTERNAL MEDICINE

## 2023-04-10 ENCOUNTER — HOSPITAL ENCOUNTER (OUTPATIENT)
Dept: ULTRASOUND IMAGING | Age: 63
Discharge: HOME | End: 2023-04-10
Attending: FAMILY MEDICINE
Payer: COMMERCIAL

## 2023-04-10 DIAGNOSIS — R79.89 ELEVATED LFTS: ICD-10-CM

## 2023-04-10 DIAGNOSIS — E04.1 THYROID NODULE GREATER THAN OR EQUAL TO 1 CM IN DIAMETER INCIDENTALLY NOTED ON IMAGING STUDY: ICD-10-CM

## 2023-04-10 PROCEDURE — 76700 US EXAM ABDOM COMPLETE: CPT | Performed by: FAMILY MEDICINE

## 2023-04-10 PROCEDURE — 76536 US EXAM OF HEAD AND NECK: CPT | Performed by: FAMILY MEDICINE

## 2023-04-15 ENCOUNTER — HOSPITAL ENCOUNTER (OUTPATIENT)
Dept: BONE DENSITY | Age: 63
Discharge: HOME | End: 2023-04-15
Attending: INTERNAL MEDICINE
Payer: COMMERCIAL

## 2023-04-15 DIAGNOSIS — Z78.0 POSTMENOPAUSAL: ICD-10-CM

## 2023-04-15 PROCEDURE — 77080 DXA BONE DENSITY AXIAL: CPT | Performed by: INTERNAL MEDICINE

## 2023-04-17 ENCOUNTER — TELEPHONE (OUTPATIENT)
Dept: FAMILY MEDICINE CLINIC | Facility: CLINIC | Age: 63
End: 2023-04-17

## 2023-04-17 ENCOUNTER — TELEPHONE (OUTPATIENT)
Dept: DERMATOLOGY CLINIC | Facility: CLINIC | Age: 63
End: 2023-04-17

## 2023-04-17 NOTE — TELEPHONE ENCOUNTER
----- Message from Aman Lord MD sent at 4/10/2023  1:14 PM CDT -----  Slightly smaller thyroid nodule,   Has this been biopsied in the past?

## 2023-04-17 NOTE — TELEPHONE ENCOUNTER
----- Message from Dianna Navas MD sent at 4/10/2023  1:14 PM CDT -----  fatty liver:   recommend avoid alcohol and tylenol products. Exercise 30 mintues most days of the week to target heart rate . Avoid High fructose Corn Syrup. Vitamin E 800 IU/day x 1 year   Treat cholesterol to goal.(chol <200,  Tg<150)  consider yearly US   weight loss.

## 2023-04-17 NOTE — TELEPHONE ENCOUNTER
Patient has not returned call regarding results. [facility] zhiwo Sent.   Louise Sinclair CMA, 04/17/23, 9:58 AM

## 2023-05-15 NOTE — TELEPHONE ENCOUNTER
Discussed results with pt. Recommend sleep eval with Ada. Overnight oxygen trend.
Discussed with DR. Zamora. Recommend consult. Needs further labs, and preop evaluation. Concern for clot. Recommend Jak2 Profile. attempted to call patient.    Left message
Dr. Shimon Scott  Can you please contact the patient as I have spoken to her several times regarding the lab results and the need for f/u. She is very concerned about her lab work and maybe you can explain this further for better understanding.
cancelled the breast inplant revision surgery, concerned about blood work, low plaletts, high white blood cell count, pleae give her a call back
verbal cues/nonverbal cues (demo/gestures)/1 person assist

## 2023-06-20 NOTE — TELEPHONE ENCOUNTER
GI CONSULT NOTE  6/20/2023  Inpatient consult to GI  Consult performed by: Mila Dangelo CNP  Consult ordered by: Carli Hutchinson MD           CHIEF COMPLAINT:    Chief Complaint   Patient presents with   • Rectal Bleeding   • Abdominal Pain       SUBJECTIVE:      82F with h/o adenoid cancer, anemia, diverticulitis in 1990s, peripheral neuralgia. Presented with c/o left sided abd pain for a week. Also poor appetite, weight loss. Was at [facility] a few weeks ago with anemia. Colonoscopy >20 years ago.     CTA AP - descending colon mass possibly with an area of active bleeding, lymphadenopathy, gallstones     Iron 18 ferritin 47  Hgb 8-9s     PROBLEM LIST:    Patient Active Problem List   Diagnosis   • Rheumatoid arthritis (CMD)   • Iron deficiency anemia due to chronic blood loss   • Abdominal pain, left lower quadrant       HISTORIES:    ALLERGIES:   Allergen Reactions   • Aspirin Other (See Comments)     Upset stomach    • Penicillins RASH     Past Medical History:   Diagnosis Date   • Abdominal tenderness    • Adenoid squamous cell carcinoma    • Allergic rhinitis    • Anemia    • Arthritis    • Constipation    • Depression    • Diarrhea    • Fungal infection of foot    • History of sinusitis    • Leucopenia    • Peripheral neuralgia      Past Surgical History:   Procedure Laterality Date   • Colonoscopy      many yrs ago per pt   • Trigger finger release       Social History     Tobacco Use   • Smoking status: Former     Current packs/day: 0.00     Types: Cigarettes   • Smokeless tobacco: Never   Substance Use Topics   • Alcohol use: Not Currently     Drug Use:    Not Current*    No family history on file.     HOME MEDICATIONS:    Medications Prior to Admission   Medication Sig Dispense Refill   • sulfaSALAzine (AZULFIDINE) 500 MG tablet Take 2 tablets by mouth in the morning and 2 tablets at noon and 2 tablets in the evening. 540 tablet 1   • CALCIUM PO Take 1 tablet by mouth daily.     • VITAMIN  Patient notified. D, CHOLECALCIFEROL, PO Take 1,000 Int'l Units/day by mouth daily.     • meclizine (ANTIVERT) 25 MG tablet Take 1 tablet by mouth 3 times daily as needed for Dizziness. 30 tablet 0   • Fexofenadine HCl (ALLEGRA PO)      • predniSONE (DELTASONE) 5 MG tablet Take 1 tablet by mouth daily. (Patient not taking: Reported on 6/19/2023) 30 tablet 0   • diphenhydrAMINE-APAP, sleep, (TYLENOL PM EXTRA STRENGTH PO) Take by mouth nightly.     • Multiple Vitamins-Minerals (MULTIVITAMIN ADULT PO)      • Cyanocobalamin (VITAMIN B-12 SL)  (Patient not taking: Reported on 6/19/2023)         INPATIENT MEDICATIONS:  Current Facility-Administered Medications   Medication   • fentaNYL (SUBLIMAZE) injection 25 mcg   • electrolyte/PEG 3350 (GOLYTELY) solution 2,000 mL   • ondansetron (ZOFRAN ODT) disintegrating tablet 4 mg    Or   • ondansetron (ZOFRAN) injection 4 mg   • pantoprazole (PROTONIX INJECT) injection 40 mg   • sodium chloride 0.9 % flush bag 25 mL   • sodium chloride (PF) 0.9 % injection 2 mL   • sodium chloride (NORMAL SALINE) 0.9 % bolus 500 mL   • Potassium Standard Replacement Protocol (Levels 3.5 and lower)   • Magnesium Standard Replacement Protocol   • Phosphorus Standard Replacement Protocol   • ondansetron (ZOFRAN) injection 4 mg   • acetaminophen (TYLENOL) tablet 650 mg   • HYDROcodone-acetaminophen (NORCO) 5-325 MG per tablet 1 tablet   • polyethylene glycol (MIRALAX) packet 17 g   • docusate sodium-sennosides (SENOKOT S) 50-8.6 MG 2 tablet   • bisacodyl (DULCOLAX) suppository 10 mg   • magnesium hydroxide (MILK OF MAGNESIA) 400 MG/5ML suspension 30 mL   • sodium chloride 0.9 % flush bag 25 mL   • sodium chloride 0.9% infusion   • simethicone (MYLICON) tablet 125 mg   • hydrALAZINE (APRESOLINE) tablet 25 mg   • benzonatate (TESSALON PERLES) capsule 100 mg   • melatonin tablet 3 mg   • morphine injection 2 mg   • hydrALAZINE (APRESOLINE) injection 10 mg   • sulfaSALAzine (AZULFIDINE) tablet 1,000 mg       REVIEW OF  SYSTEMS:    All other systems are reviewed and are negative except as documented in the history of present illness.    PHYSICAL EXAM:      Vital Signs: Blood pressure (!) 148/79, pulse 69, temperature 96.7 °F (35.9 °C), temperature source Oral, resp. rate 16, height 5' 6\" (1.676 m), weight 58.4 kg (128 lb 12 oz), SpO2 99 %.    Physical Exam  Vitals and nursing note reviewed.   HENT:      Head: Normocephalic.   Eyes:      Conjunctiva/sclera: Conjunctivae normal.   Cardiovascular:      Rate and Rhythm: Normal rate.   Pulmonary:      Effort: Pulmonary effort is normal. No respiratory distress.      Breath sounds: No wheezing.   Abdominal:      General: Bowel sounds are normal. There is no distension.      Palpations: Abdomen is soft. There is no mass.      Tenderness: There is no abdominal tenderness. There is no guarding or rebound.   Musculoskeletal:         General: Normal range of motion.      Cervical back: Normal range of motion.   Skin:     General: Skin is warm and dry.      Coloration: Skin is not pale.      Findings: No erythema or rash.   Neurological:      Mental Status: She is alert and oriented to person, place, and time.      Motor: No tremor.   Psychiatric:         Mood and Affect: Mood and affect normal.         Cognition and Memory: Memory normal.         Judgment: Judgment normal.         LABORATORY DATA and Imaging:   Recent Results (from the past 24 hour(s))   Comprehensive Metabolic Panel    Collection Time: 06/19/23 11:54 AM   Result Value Ref Range    Fasting Status      Sodium 131 (L) 135 - 145 mmol/L    Potassium 4.1 3.4 - 5.1 mmol/L    Chloride 97 97 - 110 mmol/L    Carbon Dioxide 25 21 - 32 mmol/L    Anion Gap 13 7 - 19 mmol/L    Glucose 101 (H) 70 - 99 mg/dL    BUN 13 6 - 20 mg/dL    Creatinine 0.66 0.51 - 0.95 mg/dL    Glomerular Filtration Rate 88 >=60    BUN/Cr 20 7 - 25    Calcium 9.2 8.4 - 10.2 mg/dL    Bilirubin, Total 0.3 0.2 - 1.0 mg/dL    GOT/AST 14 <=37 Units/L    GPT/ALT 14 <64  Units/L    Alkaline Phosphatase 77 45 - 117 Units/L    Albumin 3.0 (L) 3.6 - 5.1 g/dL    Protein, Total 8.1 6.4 - 8.2 g/dL    Globulin 5.1 (H) 2.0 - 4.0 g/dL    A/G Ratio 0.6 (L) 1.0 - 2.4   Lipase    Collection Time: 06/19/23 11:54 AM   Result Value Ref Range    Lipase 57 (L) 73 - 393 Units/L   CBC with Automated Differential (performable only)    Collection Time: 06/19/23 11:54 AM   Result Value Ref Range    WBC 7.7 4.2 - 11.0 K/mcL    RBC 3.37 (L) 4.00 - 5.20 mil/mcL    HGB 9.1 (L) 12.0 - 15.5 g/dL    HCT 29.4 (L) 36.0 - 46.5 %    MCV 87.2 78.0 - 100.0 fl    MCH 27.0 26.0 - 34.0 pg    MCHC 31.0 (L) 32.0 - 36.5 g/dL    RDW-CV 15.2 (H) 11.0 - 15.0 %    RDW-SD 49.0 39.0 - 50.0 fL     140 - 450 K/mcL    NRBC 0 <=0 /100 WBC    Neutrophil, Percent 76 %    Lymphocytes, Percent 13 %    Mono, Percent 8 %    Eosinophils, Percent 1 %    Basophils, Percent 1 %    Immature Granulocytes 1 %    Absolute Neutrophils 5.9 1.8 - 7.7 K/mcL    Absolute Lymphocytes 1.0 1.0 - 4.0 K/mcL    Absolute Monocytes 0.6 0.3 - 0.9 K/mcL    Absolute Eosinophils  0.1 0.0 - 0.5 K/mcL    Absolute Basophils 0.1 0.0 - 0.3 K/mcL    Absolute Immature Granulocytes 0.1 0.0 - 0.2 K/mcL   Light Blue Top    Collection Time: 06/19/23 11:54 AM   Result Value Ref Range    Extra Tube Hold for Add Ons    Pink Top Tube    Collection Time: 06/19/23 11:54 AM   Result Value Ref Range    Extra Tube Hold for Add Ons    TYPE/SCREEN    Collection Time: 06/19/23 11:54 AM   Result Value Ref Range    ABO/RH(D) A Rh Positive     ANTIBODY SCREEN Negative     TYPE AND SCREEN EXPIRATION DATE 06/22/2023 23:59    Urinalysis & Reflex Microscopy With Culture If Indicated    Collection Time: 06/19/23  1:15 PM   Result Value Ref Range    COLOR, URINALYSIS Yellow     APPEARANCE, URINALYSIS Clear     GLUCOSE, URINALYSIS Negative Negative mg/dL    BILIRUBIN, URINALYSIS Negative Negative    KETONES, URINALYSIS Negative Negative mg/dL    SPECIFIC GRAVITY, URINALYSIS 1.018 1.005 -  1.030    OCCULT BLOOD, URINALYSIS Negative Negative    PH, URINALYSIS 6.5 5.0 - 7.0    PROTEIN, URINALYSIS Trace (A) Negative mg/dL    UROBILINOGEN, URINALYSIS 0.2 0.2, 1.0 mg/dL    NITRITE, URINALYSIS Negative Negative    LEUKOCYTE ESTERASE, URINALYSIS Negative Negative   Iron And total Iron Binding Capacity    Collection Time: 06/19/23 10:27 PM   Result Value Ref Range    Iron 18 (L) 50 - 170 mcg/dL    Iron Binding Capacity 206 (L) 250 - 450 mcg/dL    Iron, Percent Saturation 9 (L) 15 - 45 %   Ferritin    Collection Time: 06/19/23 10:27 PM   Result Value Ref Range    Ferritin 47 8 - 252 ng/mL   CBC with Automated Differential (performable only)    Collection Time: 06/19/23 10:27 PM   Result Value Ref Range    WBC 6.2 4.2 - 11.0 K/mcL    RBC 2.92 (L) 4.00 - 5.20 mil/mcL    HGB 8.0 (L) 12.0 - 15.5 g/dL    HCT 25.9 (L) 36.0 - 46.5 %    MCV 88.7 78.0 - 100.0 fl    MCH 27.4 26.0 - 34.0 pg    MCHC 30.9 (L) 32.0 - 36.5 g/dL    RDW-CV 14.9 11.0 - 15.0 %    RDW-SD 48.4 39.0 - 50.0 fL     140 - 450 K/mcL    NRBC 0 <=0 /100 WBC    Neutrophil, Percent 69 %    Lymphocytes, Percent 17 %    Mono, Percent 10 %    Eosinophils, Percent 1 %    Basophils, Percent 1 %    Immature Granulocytes 2 %    Absolute Neutrophils 4.3 1.8 - 7.7 K/mcL    Absolute Lymphocytes 1.1 1.0 - 4.0 K/mcL    Absolute Monocytes 0.6 0.3 - 0.9 K/mcL    Absolute Eosinophils  0.1 0.0 - 0.5 K/mcL    Absolute Basophils 0.1 0.0 - 0.3 K/mcL    Absolute Immature Granulocytes 0.1 0.0 - 0.2 K/mcL   CBC with Automated Differential (performable only)    Collection Time: 06/20/23  5:54 AM   Result Value Ref Range    WBC 6.8 4.2 - 11.0 K/mcL    RBC 2.98 (L) 4.00 - 5.20 mil/mcL    HGB 8.2 (L) 12.0 - 15.5 g/dL    HCT 26.3 (L) 36.0 - 46.5 %    MCV 88.3 78.0 - 100.0 fl    MCH 27.5 26.0 - 34.0 pg    MCHC 31.2 (L) 32.0 - 36.5 g/dL    RDW-CV 14.9 11.0 - 15.0 %    RDW-SD 48.3 39.0 - 50.0 fL     140 - 450 K/mcL    NRBC 0 <=0 /100 WBC    Neutrophil, Percent 77 %     Lymphocytes, Percent 10 %    Mono, Percent 9 %    Eosinophils, Percent 2 %    Basophils, Percent 1 %    Immature Granulocytes 1 %    Absolute Neutrophils 5.3 1.8 - 7.7 K/mcL    Absolute Lymphocytes 0.7 (L) 1.0 - 4.0 K/mcL    Absolute Monocytes 0.6 0.3 - 0.9 K/mcL    Absolute Eosinophils  0.1 0.0 - 0.5 K/mcL    Absolute Basophils 0.1 0.0 - 0.3 K/mcL    Absolute Immature Granulocytes 0.1 0.0 - 0.2 K/mcL   Comprehensive Metabolic Panel    Collection Time: 06/20/23  5:54 AM   Result Value Ref Range    Fasting Status      Sodium 132 (L) 135 - 145 mmol/L    Potassium 3.8 3.4 - 5.1 mmol/L    Chloride 99 97 - 110 mmol/L    Carbon Dioxide 24 21 - 32 mmol/L    Anion Gap 13 7 - 19 mmol/L    Glucose 82 70 - 99 mg/dL    BUN 9 6 - 20 mg/dL    Creatinine 0.57 0.51 - 0.95 mg/dL    Glomerular Filtration Rate >90 >=60    BUN/Cr 16 7 - 25    Calcium 8.7 8.4 - 10.2 mg/dL    Bilirubin, Total 0.4 0.2 - 1.0 mg/dL    GOT/AST 13 <=37 Units/L    GPT/ALT 11 <64 Units/L    Alkaline Phosphatase 66 45 - 117 Units/L    Albumin 2.6 (L) 3.6 - 5.1 g/dL    Protein, Total 6.9 6.4 - 8.2 g/dL    Globulin 4.3 (H) 2.0 - 4.0 g/dL    A/G Ratio 0.6 (L) 1.0 - 2.4         CTA ABDOMEN PELVIS   Final Result      1.    Large circumferential necrotic mass at the distal descending colon   which extends beyond the wall into the adjacent fat, highly suspicious for   a colonic adenocarcinoma.   2.   Area of contrast blushing within the necrotic portion of the mass,   possibly an area of active bleeding.   3.    Left periaortic, right retrocrural and right cardiophrenic metastatic   lymphadenopathy.     4.   Cholelithiasis.               Electronically Signed by: JOSIE GARCIA MD    Signed on: 6/19/2023 7:47 PM    Workstation ID: [id number]              ASSESSMENT/PLAN:      82F with h/o adenoid cancer, anemia, diverticulitis in 1990s, peripheral neuralgia. Presented with c/o left sided abd pain for a week. Also poor appetite, weight loss. Was at [facility] a  few weeks ago with anemia. Colonoscopy >20 years ago.     CTA AP - descending colon mass possibly with an area of active bleeding, lymphadenopathy, gallstones     Iron 18 ferritin 47  Hgb 8-9s     - colonoscopy tomorrow (Wednesday)  - clears today, prep as ordered  - surgery evaluation    Case was discussed with patient, nursing, primary, surgery.    Thank you for involving our group in the care of this patient.  Please page us with any concerns or questions.    Electronically signed by Mila Dangelo CNP

## 2023-06-27 ENCOUNTER — TELEPHONE (OUTPATIENT)
Dept: FAMILY MEDICINE CLINIC | Facility: CLINIC | Age: 63
End: 2023-06-27

## 2023-06-27 ENCOUNTER — LAB ENCOUNTER (OUTPATIENT)
Dept: LAB | Age: 63
End: 2023-06-27
Attending: FAMILY MEDICINE
Payer: COMMERCIAL

## 2023-06-27 ENCOUNTER — OFFICE VISIT (OUTPATIENT)
Dept: FAMILY MEDICINE CLINIC | Facility: CLINIC | Age: 63
End: 2023-06-27
Payer: COMMERCIAL

## 2023-06-27 VITALS
WEIGHT: 225 LBS | DIASTOLIC BLOOD PRESSURE: 82 MMHG | TEMPERATURE: 97 F | RESPIRATION RATE: 18 BRPM | OXYGEN SATURATION: 95 % | HEART RATE: 78 BPM | SYSTOLIC BLOOD PRESSURE: 136 MMHG | BODY MASS INDEX: 37.49 KG/M2 | HEIGHT: 65 IN

## 2023-06-27 DIAGNOSIS — B35.4 RINGWORM OF BODY: ICD-10-CM

## 2023-06-27 DIAGNOSIS — R79.89 ELEVATED LFTS: ICD-10-CM

## 2023-06-27 DIAGNOSIS — E78.2 MIXED HYPERLIPIDEMIA: ICD-10-CM

## 2023-06-27 DIAGNOSIS — I10 ESSENTIAL HYPERTENSION: Primary | ICD-10-CM

## 2023-06-27 DIAGNOSIS — E66.09 CLASS 2 OBESITY DUE TO EXCESS CALORIES WITHOUT SERIOUS COMORBIDITY WITH BODY MASS INDEX (BMI) OF 37.0 TO 37.9 IN ADULT: ICD-10-CM

## 2023-06-27 DIAGNOSIS — E88.81 METABOLIC SYNDROME X: ICD-10-CM

## 2023-06-27 LAB
ALBUMIN SERPL-MCNC: 4.1 G/DL (ref 3.4–5)
ALBUMIN/GLOB SERPL: 1.1 {RATIO} (ref 1–2)
ALP LIVER SERPL-CCNC: 112 U/L
ALT SERPL-CCNC: 41 U/L
ANION GAP SERPL CALC-SCNC: 8 MMOL/L (ref 0–18)
AST SERPL-CCNC: 37 U/L (ref 15–37)
BILIRUB SERPL-MCNC: 0.7 MG/DL (ref 0.1–2)
BUN BLD-MCNC: 16 MG/DL (ref 7–18)
CALCIUM BLD-MCNC: 9.7 MG/DL (ref 8.5–10.1)
CHLORIDE SERPL-SCNC: 109 MMOL/L (ref 98–112)
CO2 SERPL-SCNC: 23 MMOL/L (ref 21–32)
CREAT BLD-MCNC: 0.85 MG/DL
CREAT UR-SCNC: 89.6 MG/DL
FASTING STATUS PATIENT QL REPORTED: YES
GFR SERPLBLD BASED ON 1.73 SQ M-ARVRAT: 77 ML/MIN/1.73M2 (ref 60–?)
GLOBULIN PLAS-MCNC: 3.9 G/DL (ref 2.8–4.4)
GLUCOSE BLD-MCNC: 107 MG/DL (ref 70–99)
MICROALBUMIN UR-MCNC: 2.16 MG/DL
MICROALBUMIN/CREAT 24H UR-RTO: 24.1 UG/MG (ref ?–30)
OSMOLALITY SERPL CALC.SUM OF ELEC: 292 MOSM/KG (ref 275–295)
POTASSIUM SERPL-SCNC: 4.2 MMOL/L (ref 3.5–5.1)
PROT SERPL-MCNC: 8 G/DL (ref 6.4–8.2)
SODIUM SERPL-SCNC: 140 MMOL/L (ref 136–145)

## 2023-06-27 PROCEDURE — 82570 ASSAY OF URINE CREATININE: CPT

## 2023-06-27 PROCEDURE — 36415 COLL VENOUS BLD VENIPUNCTURE: CPT

## 2023-06-27 PROCEDURE — 83036 HEMOGLOBIN GLYCOSYLATED A1C: CPT

## 2023-06-27 PROCEDURE — 3075F SYST BP GE 130 - 139MM HG: CPT | Performed by: FAMILY MEDICINE

## 2023-06-27 PROCEDURE — 80074 ACUTE HEPATITIS PANEL: CPT

## 2023-06-27 PROCEDURE — 80053 COMPREHEN METABOLIC PANEL: CPT

## 2023-06-27 PROCEDURE — 82043 UR ALBUMIN QUANTITATIVE: CPT

## 2023-06-27 PROCEDURE — 99214 OFFICE O/P EST MOD 30 MIN: CPT | Performed by: FAMILY MEDICINE

## 2023-06-27 PROCEDURE — 3008F BODY MASS INDEX DOCD: CPT | Performed by: FAMILY MEDICINE

## 2023-06-27 PROCEDURE — 3079F DIAST BP 80-89 MM HG: CPT | Performed by: FAMILY MEDICINE

## 2023-06-27 RX ORDER — CLOTRIMAZOLE AND BETAMETHASONE DIPROPIONATE 10; .64 MG/G; MG/G
1 CREAM TOPICAL 2 TIMES DAILY PRN
Qty: 60 G | Refills: 1 | Status: SHIPPED | OUTPATIENT
Start: 2023-06-27

## 2023-06-27 RX ORDER — TERBINAFINE HYDROCHLORIDE 250 MG/1
250 TABLET ORAL DAILY
Qty: 7 TABLET | Refills: 0 | Status: SHIPPED | OUTPATIENT
Start: 2023-06-27

## 2023-06-27 RX ORDER — BLOOD-GLUCOSE METER
1 KIT MISCELLANEOUS DAILY
COMMUNITY
Start: 2023-03-30

## 2023-06-28 LAB
EST. AVERAGE GLUCOSE BLD GHB EST-MCNC: 131 MG/DL (ref 68–126)
HAV IGM SER QL: NONREACTIVE
HBA1C MFR BLD: 6.2 % (ref ?–5.7)
HBV CORE IGM SER QL: NONREACTIVE
HBV SURFACE AG SERPL QL IA: NONREACTIVE
HCV AB SERPL QL IA: NONREACTIVE

## 2023-07-03 ENCOUNTER — TELEPHONE (OUTPATIENT)
Dept: FAMILY MEDICINE CLINIC | Facility: CLINIC | Age: 63
End: 2023-07-03

## 2023-07-21 ENCOUNTER — TELEPHONE (OUTPATIENT)
Dept: FAMILY MEDICINE CLINIC | Facility: CLINIC | Age: 63
End: 2023-07-21

## 2023-07-21 NOTE — TELEPHONE ENCOUNTER
3/29/23 ortho referral -- insurance states they didn't get referral and now patient has a substantial charge      Call back [phone]

## 2023-07-21 NOTE — TELEPHONE ENCOUNTER
Per patient, problem has been resolved. Had another  fax the ortho referral to Dr. Danny Figueroa to her insurance earlier today.

## 2023-07-26 PROBLEM — Z86.010 HISTORY OF ADENOMATOUS POLYP OF COLON: Status: ACTIVE | Noted: 2023-07-26

## 2023-07-26 PROBLEM — D12.2 BENIGN NEOPLASM OF ASCENDING COLON: Status: ACTIVE | Noted: 2023-07-26

## 2023-07-26 PROBLEM — Z86.0101 HISTORY OF ADENOMATOUS POLYP OF COLON: Status: ACTIVE | Noted: 2023-07-26

## 2023-07-26 PROBLEM — D12.5 BENIGN NEOPLASM OF SIGMOID COLON: Status: ACTIVE | Noted: 2023-07-26

## 2023-07-26 PROCEDURE — 88305 TISSUE EXAM BY PATHOLOGIST: CPT | Performed by: STUDENT IN AN ORGANIZED HEALTH CARE EDUCATION/TRAINING PROGRAM

## 2023-08-14 RX ORDER — EZETIMIBE 10 MG/1
10 TABLET ORAL NIGHTLY
Qty: 90 TABLET | Refills: 0 | Status: SHIPPED | OUTPATIENT
Start: 2023-08-14

## 2023-08-14 RX ORDER — ROSUVASTATIN CALCIUM 10 MG/1
10 TABLET, COATED ORAL NIGHTLY
Qty: 90 TABLET | Refills: 0 | Status: SHIPPED | OUTPATIENT
Start: 2023-08-14

## 2023-08-14 NOTE — TELEPHONE ENCOUNTER
Future appt:    Last Appointment with provider:   6/27/2023 for diabetes follow up. Last appointment at [facility]:  6/27/2023  Cholesterol, Total (mg/dL)   Date Value   03/28/2023 157   08/16/2017 212     Total Cholesterol (mg/dL)   Date Value   08/15/2018 225 (H)     HDL Cholesterol (mg/dL)   Date Value   03/28/2023 52   08/15/2018 57     LDL Cholesterol Calc (mg/dL)   Date Value   08/15/2018 125 (H)     LDL Cholesterol (mg/dL)   Date Value   03/28/2023 73     Triglycerides (mg/dL)   Date Value   03/28/2023 191 (H)   08/15/2018 213 (H)     Lab Results   Component Value Date     (H) 06/27/2023    A1C 6.2 (H) 06/27/2023     Lab Results   Component Value Date    T4F 0.8 02/28/2022    TSH 1.110 03/28/2023       No follow-ups on file.

## 2023-08-15 ENCOUNTER — TELEPHONE (OUTPATIENT)
Dept: FAMILY MEDICINE CLINIC | Facility: CLINIC | Age: 63
End: 2023-08-15

## 2023-08-15 NOTE — TELEPHONE ENCOUNTER
Patient fell down some stairs on 8/14 onto left shoulder. Is able to move it, but has pain and would like to rule out any serious injury. Needs to be seen before she can be referred to ortho. Scheduled with first available provider.     Future Appointments   Date Time Provider Katie Poe   8/17/2023 10:00 AM Katey Glover APRN [facility]

## 2023-08-17 ENCOUNTER — OFFICE VISIT (OUTPATIENT)
Dept: FAMILY MEDICINE CLINIC | Facility: CLINIC | Age: 63
End: 2023-08-17
Payer: COMMERCIAL

## 2023-08-17 VITALS
HEIGHT: 65 IN | TEMPERATURE: 97 F | SYSTOLIC BLOOD PRESSURE: 140 MMHG | OXYGEN SATURATION: 98 % | RESPIRATION RATE: 18 BRPM | BODY MASS INDEX: 36.65 KG/M2 | DIASTOLIC BLOOD PRESSURE: 88 MMHG | HEART RATE: 68 BPM | WEIGHT: 220 LBS

## 2023-08-17 DIAGNOSIS — R07.81 RIB PAIN ON LEFT SIDE: ICD-10-CM

## 2023-08-17 DIAGNOSIS — M25.512 ACUTE PAIN OF LEFT SHOULDER: Primary | ICD-10-CM

## 2023-08-17 PROCEDURE — 3077F SYST BP >= 140 MM HG: CPT | Performed by: NURSE PRACTITIONER

## 2023-08-17 PROCEDURE — 3079F DIAST BP 80-89 MM HG: CPT | Performed by: NURSE PRACTITIONER

## 2023-08-17 PROCEDURE — 3008F BODY MASS INDEX DOCD: CPT | Performed by: NURSE PRACTITIONER

## 2023-08-17 PROCEDURE — 99214 OFFICE O/P EST MOD 30 MIN: CPT | Performed by: NURSE PRACTITIONER

## 2023-08-17 NOTE — PATIENT INSTRUCTIONS
Schedule appointment for x-rays. Referral done to Dr. Denae Renee. Please call to schedule appointment     Follow up as needed.

## 2023-08-29 ENCOUNTER — TELEPHONE (OUTPATIENT)
Dept: FAMILY MEDICINE CLINIC | Facility: CLINIC | Age: 63
End: 2023-08-29

## 2023-09-01 ENCOUNTER — HOSPITAL ENCOUNTER (OUTPATIENT)
Dept: GENERAL RADIOLOGY | Age: 63
Discharge: HOME | End: 2023-09-01
Attending: ORTHOPAEDIC SURGERY
Payer: COMMERCIAL

## 2023-09-01 ENCOUNTER — OFFICE VISIT (OUTPATIENT)
Dept: ORTHOPEDICS CLINIC | Facility: CLINIC | Age: 63
End: 2023-09-01
Payer: COMMERCIAL

## 2023-09-01 ENCOUNTER — TELEPHONE (OUTPATIENT)
Dept: FAMILY MEDICINE CLINIC | Facility: CLINIC | Age: 63
End: 2023-09-01

## 2023-09-01 VITALS — BODY MASS INDEX: 35.36 KG/M2 | WEIGHT: 220 LBS | HEIGHT: 66 IN

## 2023-09-01 DIAGNOSIS — M25.512 LEFT SHOULDER PAIN, UNSPECIFIED CHRONICITY: ICD-10-CM

## 2023-09-01 DIAGNOSIS — S46.002A INJURY OF LEFT ROTATOR CUFF, INITIAL ENCOUNTER: Primary | ICD-10-CM

## 2023-09-01 PROCEDURE — 73030 X-RAY EXAM OF SHOULDER: CPT | Performed by: ORTHOPAEDIC SURGERY

## 2023-09-01 PROCEDURE — 99204 OFFICE O/P NEW MOD 45 MIN: CPT | Performed by: ORTHOPAEDIC SURGERY

## 2023-09-01 PROCEDURE — 3008F BODY MASS INDEX DOCD: CPT | Performed by: ORTHOPAEDIC SURGERY

## 2023-09-01 RX ORDER — AMOXICILLIN 500 MG/1
TABLET, FILM COATED ORAL
COMMUNITY
Start: 2023-04-03

## 2023-09-01 RX ORDER — LANCETS 33 GAUGE
1 EACH MISCELLANEOUS 2 TIMES DAILY
COMMUNITY
Start: 2023-03-30

## 2023-09-05 ENCOUNTER — TELEPHONE (OUTPATIENT)
Dept: ORTHOPEDICS CLINIC | Facility: CLINIC | Age: 63
End: 2023-09-05

## 2023-09-05 NOTE — TELEPHONE ENCOUNTER
LMOM that prior auth was completed by [facility] referrals.  [facility] is auto approved for MRI's through [facility]

## 2023-09-12 NOTE — CM/SW NOTE
SW received MDO for s/p joint. SW met with pt to complete an initial assessment. SW confirmed pt's address and phone number with the pt. Pt lives in a 1 level  house alone; however, her mother in [facility] and aunt will stay with her upon discharge.  There is/are 08-Jul-2023 05:49:03

## 2023-09-13 ENCOUNTER — HOSPITAL ENCOUNTER (OUTPATIENT)
Dept: MRI IMAGING | Facility: HOSPITAL | Age: 63
Discharge: HOME | End: 2023-09-13
Attending: ORTHOPAEDIC SURGERY
Payer: COMMERCIAL

## 2023-09-13 DIAGNOSIS — S46.002A INJURY OF LEFT ROTATOR CUFF, INITIAL ENCOUNTER: ICD-10-CM

## 2023-09-13 PROCEDURE — 73221 MRI JOINT UPR EXTREM W/O DYE: CPT | Performed by: ORTHOPAEDIC SURGERY

## 2023-09-20 ENCOUNTER — TELEPHONE (OUTPATIENT)
Dept: ORTHOPEDICS CLINIC | Facility: CLINIC | Age: 63
End: 2023-09-20

## 2023-09-20 ENCOUNTER — OFFICE VISIT (OUTPATIENT)
Dept: ORTHOPEDICS CLINIC | Facility: CLINIC | Age: 63
End: 2023-09-20
Payer: COMMERCIAL

## 2023-09-20 ENCOUNTER — TELEPHONE (OUTPATIENT)
Dept: FAMILY MEDICINE CLINIC | Facility: CLINIC | Age: 63
End: 2023-09-20

## 2023-09-20 DIAGNOSIS — M75.42 SUBACROMIAL IMPINGEMENT OF LEFT SHOULDER: ICD-10-CM

## 2023-09-20 DIAGNOSIS — S43.432A SUPERIOR GLENOID LABRUM LESION OF LEFT SHOULDER, INITIAL ENCOUNTER: ICD-10-CM

## 2023-09-20 DIAGNOSIS — M75.22 BICEPS TENDINITIS OF LEFT UPPER EXTREMITY: ICD-10-CM

## 2023-09-20 DIAGNOSIS — M19.019 AC JOINT ARTHROPATHY: ICD-10-CM

## 2023-09-20 DIAGNOSIS — S46.012A TRAUMATIC COMPLETE TEAR OF LEFT ROTATOR CUFF, INITIAL ENCOUNTER: Primary | ICD-10-CM

## 2023-09-20 PROCEDURE — 99215 OFFICE O/P EST HI 40 MIN: CPT | Performed by: ORTHOPAEDIC SURGERY

## 2023-09-20 PROCEDURE — 99417 PROLNG OP E/M EACH 15 MIN: CPT | Performed by: ORTHOPAEDIC SURGERY

## 2023-09-20 NOTE — TELEPHONE ENCOUNTER
Date of Surgery:  2023       Post Op Appt:   8AM    Case ID:  [id number]    Notes: Likely . [facility] preferred.                  OR BOOKING SHEET SHOULDER  Location: [x] [facility]                    [] [address]  Name: Emiliana Hodges  MRN: [id number]   : 3/19/1960  Diagnos  [x] Traumatic complete tear of left rotator cuff, initial encounter [S46.012A]  Disposition:    [x] Ambulatory  [] Overnight for MEI  [] Overnight for observation and pain control  [] Inpatient procedure     Operative Time Required:  2.5 hours [place])   Antibiotics: 2 g cefazolin within 60 minutes of surgical incision  Procedure:   Laterality:                  [] RIGHT                  [x] LEFT                   [] BILATERAL  Procedures:                    [x] Shoulder Arthroscopy                                 [x] Rotator Cuff Repair (96326)  [x] Arthroscopic Biceps Tenodesis (57356)  [x] Subacromial Decompression (25763)  [x] Distal Clavicle Excision (07047)     [] SLAP repair (95299)  [] Capsulorraphy / Bankart (46792)     Additional info:   [x] PCP Clearance Needed  [] MRSA  [] C-Arm  [x] TXA at time surgery  [x] Physical Therapy Internal - Lynda Johnson  [x] DME Rx Needed  [] Appt with Dr. Cyndee Garcia needed  Implants needed: Arthrex  Positioning Equipment: Rei Nixon

## 2023-09-20 NOTE — TELEPHONE ENCOUNTER
CALLED PATIENT AND WE SCHEDULED SURGERY, SCHEDULED POST OP AND WENT OVER PRE OPERATIVE PROCEDURES. ALL QUESTIONS ANSWERED    PATIENT WILL CALL BACK WITH NEW PCP INFORMATION SO I CAN FAX OVER PRE OP CLEARANCE.

## 2023-09-20 NOTE — TELEPHONE ENCOUNTER
Patient states she is having surgery the first or second week in November. Advised patient pre-op clearance needs to be within 30 days of surgery. She will either need to have surgery in Oct or schedule preop with another provider. Patient is aware of those options. She is going to call for appt in sandwich.

## 2023-09-20 NOTE — TELEPHONE ENCOUNTER
Due to patient insurance must be seen before the end of September. She will need to either change her doctor or her insurance going forward due to the acquisition. Can we work her in Monday or Tuesday?

## 2023-09-20 NOTE — TELEPHONE ENCOUNTER
Pt wants a pre-op appt this week or the following, pt hasn't scheduled surgery yet. Would like a call back.

## 2023-09-20 NOTE — PROGRESS NOTES
OR BOOKING SHEET SHOULDER  Location: [x] [facility]   [] [address]  Name: Kalina Jimenez  MRN: [id number]   : 3/19/1960  Diagnos  [x] Traumatic complete tear of left rotator cuff, initial encounter [S46.012A]  Disposition:    [x] Ambulatory  [] Overnight for MEI  [] Overnight for observation and pain control  [] Inpatient procedure    Operative Time Required:  2.5 hours Ame Morgan   Antibiotics: 2 g cefazolin within 60 minutes of surgical incision  Procedure:   Laterality: [] RIGHT [x] LEFT                  [] BILATERAL  Procedures:   [x] Shoulder Arthroscopy    [x] Rotator Cuff Repair (80784)  [x] Arthroscopic Biceps Tenodesis (79677)  [x] Subacromial Decompression (66387)  [x] Distal Clavicle Excision (28187)    [] SLAP repair (32600)  [] Capsulorraphy / Bankart (92097)    Additional info:   [x] PCP Clearance Needed  [] MRSA  [] C-Arm  [x] TXA at time surgery  [x] Physical Therapy Internal - Barabayuri Reddy  [x] DME Rx Needed  [] Appt with Dr. Carson Lowers needed  Implants needed: Arthrex  Positioning Equipment: Joel Nails

## 2023-10-09 ENCOUNTER — TELEPHONE (OUTPATIENT)
Dept: PHYSICAL THERAPY | Facility: HOSPITAL | Age: 63
End: 2023-10-09

## 2023-10-12 ENCOUNTER — OFFICE VISIT (OUTPATIENT)
Dept: FAMILY MEDICINE CLINIC | Facility: CLINIC | Age: 63
End: 2023-10-12
Payer: COMMERCIAL

## 2023-10-12 VITALS
HEART RATE: 70 BPM | HEIGHT: 66 IN | DIASTOLIC BLOOD PRESSURE: 78 MMHG | BODY MASS INDEX: 36.32 KG/M2 | SYSTOLIC BLOOD PRESSURE: 132 MMHG | OXYGEN SATURATION: 96 % | RESPIRATION RATE: 16 BRPM | TEMPERATURE: 98 F | WEIGHT: 226 LBS

## 2023-10-12 DIAGNOSIS — Z76.89 ENCOUNTER TO ESTABLISH CARE: Primary | ICD-10-CM

## 2023-10-12 DIAGNOSIS — R73.03 PREDIABETES: ICD-10-CM

## 2023-11-02 ENCOUNTER — MED REC SCAN ONLY (OUTPATIENT)
Dept: ORTHOPEDICS CLINIC | Facility: CLINIC | Age: 63
End: 2023-11-02

## 2023-11-08 RX ORDER — ONDANSETRON 4 MG/1
TABLET, FILM COATED ORAL
Qty: 8 TABLET | Refills: 0 | Status: SHIPPED | OUTPATIENT
Start: 2023-11-08

## 2023-11-08 RX ORDER — TRAMADOL HYDROCHLORIDE 50 MG/1
TABLET ORAL
Qty: 20 TABLET | Refills: 1 | Status: SHIPPED | OUTPATIENT
Start: 2023-11-08

## 2023-11-10 ENCOUNTER — TELEPHONE (OUTPATIENT)
Dept: PHYSICAL THERAPY | Facility: HOSPITAL | Age: 63
End: 2023-11-10

## 2023-11-14 ENCOUNTER — HOSPITAL ENCOUNTER (OUTPATIENT)
Facility: HOSPITAL | Age: 63
Setting detail: HOSPITAL OUTPATIENT SURGERY
Discharge: HOME | End: 2023-11-14
Attending: ORTHOPAEDIC SURGERY | Admitting: ORTHOPAEDIC SURGERY
Payer: COMMERCIAL

## 2023-11-14 ENCOUNTER — ANESTHESIA (OUTPATIENT)
Dept: SURGERY | Facility: HOSPITAL | Age: 63
End: 2023-11-14
Payer: COMMERCIAL

## 2023-11-14 ENCOUNTER — ANESTHESIA EVENT (OUTPATIENT)
Dept: SURGERY | Facility: HOSPITAL | Age: 63
End: 2023-11-14
Payer: COMMERCIAL

## 2023-11-14 VITALS
OXYGEN SATURATION: 97 % | WEIGHT: 223.19 LBS | TEMPERATURE: 98 F | HEIGHT: 66 IN | SYSTOLIC BLOOD PRESSURE: 135 MMHG | BODY MASS INDEX: 35.87 KG/M2 | DIASTOLIC BLOOD PRESSURE: 87 MMHG | HEART RATE: 73 BPM | RESPIRATION RATE: 18 BRPM

## 2023-11-14 PROCEDURE — 0RNK4ZZ RELEASE LEFT SHOULDER JOINT, PERCUTANEOUS ENDOSCOPIC APPROACH: ICD-10-PCS | Performed by: ORTHOPAEDIC SURGERY

## 2023-11-14 PROCEDURE — 0RHK44Z INSERTION OF INTERNAL FIXATION DEVICE INTO LEFT SHOULDER JOINT, PERCUTANEOUS ENDOSCOPIC APPROACH: ICD-10-PCS | Performed by: ORTHOPAEDIC SURGERY

## 2023-11-14 PROCEDURE — 0RBK4ZZ EXCISION OF LEFT SHOULDER JOINT, PERCUTANEOUS ENDOSCOPIC APPROACH: ICD-10-PCS | Performed by: ORTHOPAEDIC SURGERY

## 2023-11-14 PROCEDURE — 76942 ECHO GUIDE FOR BIOPSY: CPT | Performed by: ANESTHESIOLOGY

## 2023-11-14 PROCEDURE — 0LS44ZZ REPOSITION LEFT UPPER ARM TENDON, PERCUTANEOUS ENDOSCOPIC APPROACH: ICD-10-PCS | Performed by: ORTHOPAEDIC SURGERY

## 2023-11-14 PROCEDURE — 0LM24ZZ REATTACHMENT OF LEFT SHOULDER TENDON, PERCUTANEOUS ENDOSCOPIC APPROACH: ICD-10-PCS | Performed by: ORTHOPAEDIC SURGERY

## 2023-11-14 PROCEDURE — 0PBB4ZZ EXCISION OF LEFT CLAVICLE, PERCUTANEOUS ENDOSCOPIC APPROACH: ICD-10-PCS | Performed by: ORTHOPAEDIC SURGERY

## 2023-11-14 DEVICE — ALPHAVENT SUTURE ANCHOR 4.75MM PEEK, SUTURE ANCHOR WITH 2 STRANDS 1.4MM XBRAID TT SUTURE TAPE
Type: IMPLANTABLE DEVICE | Site: SHOULDER | Status: FUNCTIONAL
Brand: ALPHAVENT

## 2023-11-14 DEVICE — ALPHAVENT SUTURE ANCHOR 4.75MM PEEK, SUTURE ANCHOR WITH 3 STRANDS 1.4MM XBRAID TT SUTURE TAPE
Type: IMPLANTABLE DEVICE | Site: SHOULDER | Status: FUNCTIONAL
Brand: ALPHAVENT

## 2023-11-14 RX ORDER — MEPERIDINE HYDROCHLORIDE 25 MG/ML
12.5 INJECTION INTRAMUSCULAR; INTRAVENOUS; SUBCUTANEOUS AS NEEDED
Status: DISCONTINUED | OUTPATIENT
Start: 2023-11-14 | End: 2023-11-14

## 2023-11-14 RX ORDER — ONDANSETRON 2 MG/ML
INJECTION INTRAMUSCULAR; INTRAVENOUS AS NEEDED
Status: DISCONTINUED | OUTPATIENT
Start: 2023-11-14 | End: 2023-11-14 | Stop reason: SURG

## 2023-11-14 RX ORDER — SCOLOPAMINE TRANSDERMAL SYSTEM 1 MG/1
1 PATCH, EXTENDED RELEASE TRANSDERMAL ONCE
Status: DISCONTINUED | OUTPATIENT
Start: 2023-11-14 | End: 2023-11-14

## 2023-11-14 RX ORDER — MIDAZOLAM HYDROCHLORIDE 1 MG/ML
1 INJECTION INTRAMUSCULAR; INTRAVENOUS EVERY 5 MIN PRN
Status: DISCONTINUED | OUTPATIENT
Start: 2023-11-14 | End: 2023-11-14

## 2023-11-14 RX ORDER — PROCHLORPERAZINE EDISYLATE 5 MG/ML
5 INJECTION INTRAMUSCULAR; INTRAVENOUS EVERY 8 HOURS PRN
Status: DISCONTINUED | OUTPATIENT
Start: 2023-11-14 | End: 2023-11-14

## 2023-11-14 RX ORDER — SODIUM CHLORIDE, SODIUM LACTATE, POTASSIUM CHLORIDE, CALCIUM CHLORIDE 600; 310; 30; 20 MG/100ML; MG/100ML; MG/100ML; MG/100ML
INJECTION, SOLUTION INTRAVENOUS CONTINUOUS
Status: DISCONTINUED | OUTPATIENT
Start: 2023-11-14 | End: 2023-11-14

## 2023-11-14 RX ORDER — HYDROMORPHONE HYDROCHLORIDE 1 MG/ML
0.2 INJECTION, SOLUTION INTRAMUSCULAR; INTRAVENOUS; SUBCUTANEOUS EVERY 5 MIN PRN
Status: DISCONTINUED | OUTPATIENT
Start: 2023-11-14 | End: 2023-11-14

## 2023-11-14 RX ORDER — TRANEXAMIC ACID 10 MG/ML
1000 INJECTION, SOLUTION INTRAVENOUS ONCE
Status: DISCONTINUED | OUTPATIENT
Start: 2023-11-14 | End: 2023-11-14 | Stop reason: HOSPADM

## 2023-11-14 RX ORDER — ACETAMINOPHEN 500 MG
1000 TABLET ORAL ONCE AS NEEDED
Status: DISCONTINUED | OUTPATIENT
Start: 2023-11-14 | End: 2023-11-14

## 2023-11-14 RX ORDER — DEXAMETHASONE SODIUM PHOSPHATE 4 MG/ML
VIAL (ML) INJECTION AS NEEDED
Status: DISCONTINUED | OUTPATIENT
Start: 2023-11-14 | End: 2023-11-14 | Stop reason: SURG

## 2023-11-14 RX ORDER — METOCLOPRAMIDE HYDROCHLORIDE 5 MG/ML
INJECTION INTRAMUSCULAR; INTRAVENOUS AS NEEDED
Status: DISCONTINUED | OUTPATIENT
Start: 2023-11-14 | End: 2023-11-14 | Stop reason: SURG

## 2023-11-14 RX ORDER — MIDAZOLAM HYDROCHLORIDE 1 MG/ML
INJECTION INTRAMUSCULAR; INTRAVENOUS AS NEEDED
Status: DISCONTINUED | OUTPATIENT
Start: 2023-11-14 | End: 2023-11-14 | Stop reason: SURG

## 2023-11-14 RX ORDER — TRANEXAMIC ACID 10 MG/ML
INJECTION, SOLUTION INTRAVENOUS AS NEEDED
Status: DISCONTINUED | OUTPATIENT
Start: 2023-11-14 | End: 2023-11-14 | Stop reason: SURG

## 2023-11-14 RX ORDER — HYDROCODONE BITARTRATE AND ACETAMINOPHEN 5; 325 MG/1; MG/1
1 TABLET ORAL ONCE AS NEEDED
Status: DISCONTINUED | OUTPATIENT
Start: 2023-11-14 | End: 2023-11-14

## 2023-11-14 RX ORDER — ONDANSETRON 2 MG/ML
4 INJECTION INTRAMUSCULAR; INTRAVENOUS EVERY 6 HOURS PRN
Status: DISCONTINUED | OUTPATIENT
Start: 2023-11-14 | End: 2023-11-14

## 2023-11-14 RX ORDER — CEFAZOLIN SODIUM/WATER 2 G/20 ML
2 SYRINGE (ML) INTRAVENOUS ONCE
Status: COMPLETED | OUTPATIENT
Start: 2023-11-14 | End: 2023-11-14

## 2023-11-14 RX ORDER — HYDROMORPHONE HYDROCHLORIDE 1 MG/ML
0.4 INJECTION, SOLUTION INTRAMUSCULAR; INTRAVENOUS; SUBCUTANEOUS EVERY 5 MIN PRN
Status: DISCONTINUED | OUTPATIENT
Start: 2023-11-14 | End: 2023-11-14

## 2023-11-14 RX ORDER — NALOXONE HYDROCHLORIDE 0.4 MG/ML
80 INJECTION, SOLUTION INTRAMUSCULAR; INTRAVENOUS; SUBCUTANEOUS AS NEEDED
Status: DISCONTINUED | OUTPATIENT
Start: 2023-11-14 | End: 2023-11-14

## 2023-11-14 RX ORDER — HYDROMORPHONE HYDROCHLORIDE 1 MG/ML
0.6 INJECTION, SOLUTION INTRAMUSCULAR; INTRAVENOUS; SUBCUTANEOUS EVERY 5 MIN PRN
Status: DISCONTINUED | OUTPATIENT
Start: 2023-11-14 | End: 2023-11-14

## 2023-11-14 RX ORDER — HYDROCODONE BITARTRATE AND ACETAMINOPHEN 5; 325 MG/1; MG/1
2 TABLET ORAL ONCE AS NEEDED
Status: DISCONTINUED | OUTPATIENT
Start: 2023-11-14 | End: 2023-11-14

## 2023-11-14 RX ORDER — ACETAMINOPHEN 500 MG
1000 TABLET ORAL ONCE
Status: DISCONTINUED | OUTPATIENT
Start: 2023-11-14 | End: 2023-11-14 | Stop reason: HOSPADM

## 2023-11-14 RX ADMIN — METOCLOPRAMIDE HYDROCHLORIDE 10 MG: 5 INJECTION INTRAMUSCULAR; INTRAVENOUS at 09:43:00

## 2023-11-14 RX ADMIN — SODIUM CHLORIDE, SODIUM LACTATE, POTASSIUM CHLORIDE, CALCIUM CHLORIDE: 600; 310; 30; 20 INJECTION, SOLUTION INTRAVENOUS at 11:12:00

## 2023-11-14 RX ADMIN — CEFAZOLIN SODIUM/WATER 2 G: 2 G/20 ML SYRINGE (ML) INTRAVENOUS at 09:19:00

## 2023-11-14 RX ADMIN — DEXAMETHASONE SODIUM PHOSPHATE 4 MG: 4 MG/ML VIAL (ML) INJECTION at 09:43:00

## 2023-11-14 RX ADMIN — MIDAZOLAM HYDROCHLORIDE 2 MG: 1 INJECTION INTRAMUSCULAR; INTRAVENOUS at 08:55:00

## 2023-11-14 RX ADMIN — SODIUM CHLORIDE, SODIUM LACTATE, POTASSIUM CHLORIDE, CALCIUM CHLORIDE: 600; 310; 30; 20 INJECTION, SOLUTION INTRAVENOUS at 10:20:00

## 2023-11-14 RX ADMIN — ONDANSETRON 4 MG: 2 INJECTION INTRAMUSCULAR; INTRAVENOUS at 09:43:00

## 2023-11-14 RX ADMIN — TRANEXAMIC ACID 1000 MG: 10 INJECTION, SOLUTION INTRAVENOUS at 09:27:00

## 2023-11-14 NOTE — OPERATIVE REPORT
OPERATIVE REPORT  ATTENDING SURGEON: Umesh Bentley MD  ASSISTANT(S): Bautista Alvarado [address] [facility], [place]  The aid of assistant Bautista Mahoney was needed for patient positioning, preparation, drape, retraction,  wound closure, dressing application and critical portions of the procedure. PRELIMINARY DIAGNOSIS:  1. Left Shoulder Full Thickness Rotator Cuff Tear  2. Left Shoulder Biceps Tendinitis / SLAP tear  3. Left Shoulder Subacromial Impingement  4. Left Shoulder Labral Tear  5. Left Shoulder Acromioclavicular Joint Arthropathy     POSTOPERATIVE DIAGNOSIS:  1. Left Shoulder Full Thickness Rotator Cuff Tear  2. Left Shoulder Biceps Tendinitis / SLAP tear  3. Left Shoulder Subacromial Impingement  4. Left Shoulder Labral Tear  5. Left Shoulder Acromioclavicular Joint Arthropathy     THE OPERATION:  1. Left Shoulder Arthroscopic Rotator Cuff Repair (3 tendon involvement, supraspinatus, infraspinatus, subscapularis ) (82605)  2. Left Shoulder Arthroscopic Biceps Tenodesis (20884)   3. Left Shoulder Arthroscopic Subacromial Decompression (83632)  4. Left Shoulder Arthroscopic Extensive Debridement (Glenoid Labrum, Humeral Bone, Subacromial Bursa, Articular Capsule) (18936)  5. Left Shoulder Arthroscopic Distal Claviculectomy (Pateros) (86060)      Modifier 22: This rotator cuff tail entailed massive 3 tendon pathology including subscapularis, supraspinatus, and infraspinatus. This required additional anchor placement anteriorly as well as utilization of an additional 8.25 mm cannula at approximately 25 to 50% more additional surgical time. ANESTHESIA: General Endotracheal with Regional Block   ANESTHESIOLOGIST: Sheryl Seals MD  ANTIBIOTICS: Cefazolin 2g within 60 minutes of surgical incision. IMPLANTS:   Implant Name Type Inv. Item Serial No.  Lot No. LRB No. Used Action   ANCHOR SUT L19.1MM DIA4. 75MM PEEK FT KNOTLESS - SNA  ANCHOR SUT L19.1MM DIA4. 75MM PEEK FT KNOTLESS NA [employer] WD [id number] Left 1 Implanted   ANCHOR SUT L19.1MM DIA4. 75MM PEEK FT KNOTLESS - SNA  ANCHOR SUT L19.1MM DIA4. 75MM PEEK FT KNOTLESS NA [employer] [facility] [id number] Left 1 Implanted   ALPHAVENT SUT ANCHR 4.75MM PEEK SUT ANCHR - SNA  ALPHAVENT SUT ANCHR 4.75MM PEEK SUT ANCHR NA [employer]  [id number] Left 1 Implanted   ALPHAVENT SUTURE 4.75MM - SNA  ALPHAVENT SUTURE 4.75MM NA [employer]  [id number] Left 1 Implanted     PATHOLOGY/CULTURES: None  ESTIMATED BLOOD LOSS: No data recorded  COMPLICATIONS: No intraoperative nor immediate postoperative complications noted. COUNTS: All sponge and needle counts were correct at the conclusion of the procedure. OPERATIVE FINDINGS:  Massive full-thickness rotator cuff tear involving supraspinatus and infraspinatus tendons retracted to the level of the glenoid as well as high-grade partial-thickness subscapularis tendon tear. Successfully managed with knotless anterior based subscapularis repair with arthroscopic biceps tenodesis along with double row repair of the supraspinatus and infraspinatus. Minimal to mild tension noted on repair allowing for excellent apposition to the humeral footprint. Adequate subacromial decompression and distal clavicle excision. Overall good bone quality. Mild to moderate osteoarthritis in the glenohumeral joint. Tendon quality intermediate to good. Indications:  Omar Ibarra is a 61year old female with history, physical examination, and imaging findings consistent with large full thickness rotator cuff tear involving the subscapularis, biceps tendinopathy, subacromial impingement, and AC joint arthropathy. They had failed an appropriate course of conservative, nonoperative management and thus elected to proceed with the above procedure.    Operative and nonoperative options were discussed with her in my office and we ultimately agreed that surgery would provide the highest likelihood of symptomatic relief and functional improvement. The risks and benefits of surgery as well as the postoperative rehabilitation plan were reviewed. She voiced a good understanding of treatment options, risks and benefits, and alternatives to surgery. She was given the opportunity to ask questions, which were all answered to the best of my ability and to her satisfaction. Samuel Cox wished to proceed. On the day of surgery, the Samuel Cox was seen in the preoperative area. I confirmed her identity by name and birthday. We reviewed and confirmed the surgical consent including laterality. The surgical site was marked with my initials. We re-reviewed the risks and benefits of the procedure and I answered all additional questions to her satisfaction. OPERATIVE REPORT:   Samuel Cox was taken to the operating room in stable condition. The patient was positioned in the modified beach chair position utilizing the special attachment for the [facility] bed allowing for the ipsilateral arm breakaway. Trimano arm parisi was utilized and the head was secured appropriately. The patient underwent sterile pre-prep with chlorhexidine scrub brushes and alcohol followed by chlorhexidine preparation. A surgical time out was performed where I confirmed laterality as marked by my initials, reaffirmed the consent, noted appropriate imaging studies were in the room, and that preoperative antibiotics had been given within recommended timeframe prior to skin incision. Examination under anesthesia demonstrated full shoulder range of motion, stable to anterior, posterior and inferior stresses. A 15 blade was used to make the skin incision standard posterior portal, the 4 mm arthroscope was introduced into the glenohumeral joint. A standard anterior portal was established within the rotator internal and a probe was introduced into the glenohumeral joint.    Diagnostic arthroscopy revealed:    Biceps [place] Abnormal with type 2 SLAP tearing   Superior labrum Abnormal with type 2 SLAP tearing   Anterior labrum Mild to moderate fraying   Inferior labrum Mild to moderate fraying   Posterior labrum Mild to moderate fraying   Glenoid cartilage Grade 2 versus grade 3 articular cartilage loss debrided to stable margin   Humeral cartilage Grade 2 articular cartilage loss   Rotator Interval Within normal limits   Subscapularis High-grade partial-thickness tear mildly retracted successfully managed with knotless anterior base swivel lock repair with fiber tape   Biceps tendon Subluxed anteriorly with interstitial fraying successfully managed with arthroscopic biceps tenodesis. Supraspinatus Full-thickness retracted tear   Infraspinatus Full-thickness retracted tear   Hill-Sachs Lesion None     Upon completion of the diagnostic arthroscopy, the abnormal fraying of the labrum and articular capsule at the insertion of the anterior portal was debrided with a 4.0 mm Tomcat shaver via the anterior portal.    Using spinal needle localization, and anterolateral portal within the rotator interval was established. This portal was dilated with an obturator and a large Kocher clamp was introduced and used to grasp the biceps tendon. Coblation was introduced from the anterior portal and used to detach the biceps tendon from the superior labrum. The biceps tendon was then extracted from the anterolateral portal with the aid of the Kocher clamp. The Biceps tendon was then prepared with a fiberloop. Coblation was used to remove any excess tissue. Humeral bone was debrided with combination of coablation device and 4.0 mm Tomcat mechanical shaver. This allowed for removal of pathologic abnormality and exposure of healthy bleeding bone. The anterior portal was slightly enlarged and a switching stick was placed through the previous 5 mm blue cannula to facilitate placement of an 8.25 mm cannula.   A 90 degree suture lasso was then utilized to shuttle a 2.0 mm fiber tape through the subscapularis tendon in a horizontal mattress configuration after retrieval through the anterosuperolateral portal.  The fiber tape as well as suture from long head of biceps tendon FiberWire was then shuttled anteriorly using a fiber tape retriever and placed into a 4.75 mm swivel lock anchor for fixation. At this time, the Biceps tendon loaded swivel lock anchor was placed at the margin of the articular surface and facilitating arthroscopic biceps tenodesis as well as knotless subscapularis rotator cuff repair. Excellent bone quality was noted and adequate arthroscopic biceps tenodesis was achieved. With reintroduction of the arthroscopic obturator, the subacromial space was entered. Using a sweeping motion adequate visualization was obtained. With the arthroscope in the posterior viewing portal, coablation was introduced from the anterolateral portal. The CA ligament was noted to be frayed on the undersurface and was carefully debrided identifying a type II/III acromion. Using a 5.5 mm resector shaver, the subacromial spur was flattened by approximately 6 mm and a type I acromion was restored. Using a 4.0 mm Tomcat shaver the remaining subacromial bursa was debrided, initially viewing from posterior, ensuring a complete bursectomy and adequate subacromial decompression. This process was completed by transitioning the arthroscope into the anterolateral portal and placing the shaver in the posterior portal for thorough decompression. The acromioclavicular joint was skeletonized using Coblation and significant osseous spurring was appreciable with loss of the articular cartilage surface. With the aid of a 5.5 mm resector shaver approximately 8 mm of distal clavicle was excised. The instrument was introduced from the anterior portal and visualization was performed from the posterior versus anterolateral portal.  Successful acromioclavicular joint decompression was achieved. Hemostasis was noted at conclusion of this resection. Adequate visualization of the full-thickness rotator cuff tear was performed and a posterolateral portal was established for viewing. An 8.25 mm purple cannula was placed in the anterolateral portal as the arthroscope was transitioned into the posterior lateral portal for viewing. Along the articular margin, 1 triple loaded and 1 double loaded alpha vent Donya anchors were placed. Using a TuneStarso suture passer the sutures were sequentially passed through the rotator cuff from anterior to posterior. Additional care was taken to ensure optimal spacing. subsequently knots were begun to be tied from posterior to anterior. This denoted an excellent repair and 1 lateral row swivel lock anchor(s) completed a double row construct rotator cuff repair. At conclusion of this the rotator cuff moved with excellent stability and an intact construct. Final photographs were obtained. A towel was used to extract fluid from the arthroscopic incisions and closure was performed using buried interrupted 2-0 monocryl, 3-0 Monocryl, Dermabond, Steri-Strips, gauze, and Tegaderm dressing. The patient was placed into a shoulder immobilizer. Maritza Garica was taken to the recovery room in a stable condition with plan for ambulatory discharge to home. She was provided my postoperative discharge booklet, appropriate home exercises, script for outpatient physical therapy, and a copy of my rehabilitation guidelines. POST OPERATIVE PLAN:  Activity Precautions: Passive ROMAT, 1 lb WB x 4 weeks. Begin PT POD #1. DVT Prophylaxis: Not indicated  Follow-up Visit: 1-2 weeks. Raul Tabares. Jose Vasquez MD  Knee, Shoulder, & Elbow Surgery / Sports Medicine Specialist  THE [facility] Orthopaedic Surgery  [address]   Linda Grimaldo. Namrata [email or web address]  t: [phone]  o: [phone]  f: [phone]    This note was dictated using Dragon software.   While it was briefly proofread prior to completion, some grammatical, spelling, and word choice errors due to dictation may still occur.

## 2023-11-14 NOTE — ANESTHESIA POSTPROCEDURE EVALUATION
[address] [facility] Patient Status:  Hospital Outpatient Surgery   Age/Gender 61year old female MRN [id number]   [facility] SURGERY Attending Jordon Robbins MD   Hosp Day # 0 PCP Newton Chong MD       Anesthesia Post-op Note    LEFT SHOULDER ARTHROSCOPY ROTATOR CUFF REPAIR, ARTHROSCOPIC BICEPS TENODESIS, SUBACROMIAL DECOPRESSION, DISTAL CLAVICLE EXCISION    Procedure Summary       Date: 11/14/23 Room / Location: [facility] MAIN OR 08 / [facility] MAIN OR    Anesthesia Start: 4571 Anesthesia Stop: 6292    Procedure: LEFT SHOULDER ARTHROSCOPY ROTATOR CUFF REPAIR, ARTHROSCOPIC BICEPS TENODESIS, SUBACROMIAL DECOPRESSION, DISTAL CLAVICLE EXCISION (Left: Shoulder) Diagnosis:       Traumatic complete tear of left rotator cuff, initial encounter      (Traumatic complete tear of left rotator cuff, initial encounter [B58.636L])    Surgeons: Jordon Robbins MD Anesthesiologist: Zina Del Angel MD    Anesthesia Type: general ASA Status: 2            Anesthesia Type: general    Vitals Value Taken Time   /78 11/14/23 1114   Temp 97.8 11/14/23 1114   Pulse 68 11/14/23 1114   Resp 17 11/14/23 1114   SpO2 96 11/14/23 1114       Patient Location: PACU    Anesthesia Type: general    Airway Patency: patent    Postop Pain Control: adequate    Mental Status: mildly sedated but able to meaningfully participate in the post-anesthesia evaluation    Nausea/Vomiting: none    Cardiopulmonary/Hydration status: stable euvolemic    Complications: no apparent anesthesia related complications    Postop vital signs: stable    Dental Exam: Unchanged from Preop    Patient to be discharged from PACU when criteria met.

## 2023-11-14 NOTE — ANESTHESIA PROCEDURE NOTES
Airway  Date/Time: 11/14/2023 9:11 AM  Urgency: elective      General Information and Staff    Patient location during procedure: OR  Anesthesiologist: Dick Cruz MD  Performed: anesthesiologist   Performed by: Dick Cruz MD  Authorized by: Dick Cruz MD      Indications and Patient Condition  Indications for airway management: anesthesia  Sedation level: deep  Preoxygenated: yes  Patient position: sniffing  Mask difficulty assessment: 1 - vent by mask    Final Airway Details  Final airway type: supraglottic airway      Successful airway: classic  Size 3       Number of attempts at approach: 1

## 2023-11-14 NOTE — ANESTHESIA PROCEDURE NOTES
Regional Block    Date/Time: 11/14/2023 8:55 AM    Performed by: Raeann Martins MD  Authorized by: Raeann Martins MD      General Information and Staff    Start Time:  11/14/2023 8:55 AM  End Time:  11/14/2023 9:06 AM  Anesthesiologist:  Raeann Martins MD  Performed by: Anesthesiologist  Patient Location:  OR    Block Placement: Pre Induction  Site Identification: real time ultrasound guided and image stored and retrievable    Block site/laterality marked before start: site marked  Reason for Block: at surgeon's request and post-op pain management    Preanesthetic Checklist: 2 patient identifers, IV checked, risks and benefits discussed, monitors and equipment checked, pre-op evaluation, timeout performed, anesthesia consent, sterile technique used, no prohibitive neurological deficits and no local skin infection at insertion site      Procedure Details    Patient Position:  Supine  Prep: ChloraPrep    Monitoring:  Cardiac monitor, continuous pulse ox and blood pressure cuff  Block Type: Interscalene  Laterality:  Right  Injection Technique:  Single-shot    Needle    Needle Type:  Short-bevel and echogenic  Needle Gauge:  22 G  Needle Length:  50 mm  Needle Localization:  Ultrasound guidance  Reason for Ultrasound Use: appropriate spread of the medication was noted in real time and no ultrasound evidence of intravascular and/or intraneural injection    Nerve Stimulator: 0.5 amps    Muscle Twitch Response Comment: none    Assessment    Injection Assessment:  Good spread noted, negative resistance, negative aspiration for heme, incremental injection, low pressure, no pain on injection and local visualized surrounding nerve on ultrasound  Heart Rate Change: No    - Patient tolerated block procedure well without evidence of immediate block related complications.      Medications  11/14/2023 8:55 AM      Additional Comments    Medication:  Ropivacaine 0.375% 30mL PF dexamethasone 2mg

## 2023-11-15 ENCOUNTER — OFFICE VISIT (OUTPATIENT)
Dept: PHYSICAL THERAPY | Age: 63
End: 2023-11-15
Attending: ORTHOPAEDIC SURGERY
Payer: COMMERCIAL

## 2023-11-15 DIAGNOSIS — S46.012A TRAUMATIC COMPLETE TEAR OF LEFT ROTATOR CUFF, INITIAL ENCOUNTER: Primary | ICD-10-CM

## 2023-11-15 PROCEDURE — 97161 PT EVAL LOW COMPLEX 20 MIN: CPT

## 2023-11-15 PROCEDURE — 97110 THERAPEUTIC EXERCISES: CPT

## 2023-11-17 ENCOUNTER — OFFICE VISIT (OUTPATIENT)
Dept: PHYSICAL THERAPY | Age: 63
End: 2023-11-17
Attending: ORTHOPAEDIC SURGERY
Payer: COMMERCIAL

## 2023-11-17 ENCOUNTER — TELEPHONE (OUTPATIENT)
Dept: PHYSICAL THERAPY | Facility: HOSPITAL | Age: 63
End: 2023-11-17

## 2023-11-17 PROCEDURE — 97110 THERAPEUTIC EXERCISES: CPT

## 2023-11-17 PROCEDURE — 97535 SELF CARE MNGMENT TRAINING: CPT

## 2023-11-17 NOTE — PROGRESS NOTES
Diagnosis:   L rotator cuff repair         Referring Provider: Yosef  Date of Evaluation:    11/15/23    Precautions:  Drug Allergy Next MD visit:   11/20/23  Date of Surgery: 11/14/23   Insurance Primary/Secondary: [employer] HMO / N/A     # Auth Visits: HMO 12 visits expires 12/31/23            Subjective: Pt reports she is very sore but managing ok. She states she is feeling confident with her sling donning/doffing     Pain: 5/10      Objective: All of the below objective measures are from evaluation for reference:     PROM: (* denotes performed with pain)  Shoulder  Elbow   Flexion: R 180; L 30  Abduction: R 180; L 20  ER: R 90; L 0  IR: R 55; L 15 Flexion: R 140; L 130  Extension: R 0; L -70          Assessment: Pain most limiting factor with much guarding and attempt at assist. By of visit, pt better able to allow for PROM, noting improvement in pain with AROM c spine. Sling positioning reviewed and demo returned.       Goals:   (To be met in 30 visits)   Pt will report improved ability to sleep without waking due to shoulder pain  Pt will improve shoulder flexion AROM to >170 degrees to be able to reach into overhead cabinets without pain or restriction  Pt will improve shoulder abduction AROM to >170 degrees to improve ability to don deodorant, don/doff shirts, and wash hair  Pt will increase shoulder AROM ER to 80 to reach and fasten seatbelt   Pt will increase shoulder AROM IR to 60 to be able to reach in back pocket, tuck in shirt, and turn steering wheel without pain  Pt will improve shoulder strength throughout to 5/5 to improve function with ADLs including lifting and reaching tasks  Pt will demonstrate increased mid/low trap strength to 5/5 to promote improved shoulder mechanics and stabilization with ADL such as lifting and reaching   Pt will be independent and compliant with comprehensive HEP to maintain progress achieved in PT     Plan: Progress per protocol  Date: 11/17/2023  TX#: 2/12 Date: TX#: 3/ Date:                 TX#: 4/ Date:                 TX#: 5/ Date:    Tx#: 6/   THERE EX:  PROM 19 mins within protocol  PROM elbow and hand and wrist 5 mins  AROM elbow, hand and wrist 5 mins  Seated AROM C spine 2 mins all planes gently       SELF MANAGEMENT:  Sling positioning reviewed, visual feedback of mirror before and after session 10 mins total                     HEP: Codman's pendulum      Charges: 2 ther ex 1 self management       Total Timed Treatment: 41 min  Total Treatment Time: 41 min

## 2023-11-20 ENCOUNTER — OFFICE VISIT (OUTPATIENT)
Dept: PHYSICAL THERAPY | Age: 63
End: 2023-11-20
Attending: ORTHOPAEDIC SURGERY
Payer: COMMERCIAL

## 2023-11-20 ENCOUNTER — OFFICE VISIT (OUTPATIENT)
Dept: ORTHOPEDICS CLINIC | Facility: CLINIC | Age: 63
End: 2023-11-20
Payer: COMMERCIAL

## 2023-11-20 DIAGNOSIS — Z98.890 S/P ARTHROSCOPY OF SHOULDER: Primary | ICD-10-CM

## 2023-11-20 PROCEDURE — 97535 SELF CARE MNGMENT TRAINING: CPT

## 2023-11-20 PROCEDURE — 97110 THERAPEUTIC EXERCISES: CPT

## 2023-11-20 PROCEDURE — 99024 POSTOP FOLLOW-UP VISIT: CPT | Performed by: PHYSICIAN ASSISTANT

## 2023-11-20 NOTE — PROGRESS NOTES
Diagnosis:   L rotator cuff repair         Referring Provider: Yosef  Date of Evaluation:    11/15/23    Precautions:  Drug Allergy Next MD visit:   11/20/23  Date of Surgery: 11/14/23   Insurance Primary/Secondary: [facility] HMO / N/A     # Auth Visits: O 12 visits expires 12/31/23            Subjective: MD happy with shoulder so far, pain not as intense. Bought TENS unit to help with sleep as sleep is still disturbed. Pain: 5/10      Objective: All of the below objective measures are from evaluation for reference:     PROM: (* denotes performed with pain)  Shoulder  Elbow   Flexion: R 180; L 30  Abduction: R 180; L 20  ER: R 90; L 0  IR: R 55; L 15 Flexion: R 140; L 130  Extension: R 0; L -70          Assessment: Improving tolerance to ROM. Reviewed TENS placement and set-up. Goals:   (To be met in 30 visits)   Pt will report improved ability to sleep without waking due to shoulder pain  Pt will improve shoulder flexion AROM to >170 degrees to be able to reach into overhead cabinets without pain or restriction  Pt will improve shoulder abduction AROM to >170 degrees to improve ability to don deodorant, don/doff shirts, and wash hair  Pt will increase shoulder AROM ER to 80 to reach and fasten seatbelt   Pt will increase shoulder AROM IR to 60 to be able to reach in back pocket, tuck in shirt, and turn steering wheel without pain  Pt will improve shoulder strength throughout to 5/5 to improve function with ADLs including lifting and reaching tasks  Pt will demonstrate increased mid/low trap strength to 5/5 to promote improved shoulder mechanics and stabilization with ADL such as lifting and reaching   Pt will be independent and compliant with comprehensive HEP to maintain progress achieved in PT     Plan: Progress per protocol  Date: 11/17/2023  TX#: 2/12 Date:  11/20/2023               TX#: 3/12 Date:                 TX#: 4/ Date:                 TX#: 5/ Date:    Tx#: 6/ THERE EX:  PROM 19 mins within protocol  PROM elbow and hand and wrist 5 mins  AROM elbow, hand and wrist 5 mins  Seated AROM C spine 2 mins all planes gently THERE EX:  PROM 19 mins within protocol  PROM elbow and hand and wrist 5 mins  AROM elbow, hand and wrist 5 mins      SELF MANAGEMENT:  Sling positioning reviewed, visual feedback of mirror before and after session 10 mins total SELF MANAGEMENT  TENS set-up and placement 8'                     HEP: Codman's pendulum      Charges: 2 ther ex 1 self management       Total Timed Treatment: 41 min  Total Treatment Time: 41 min

## 2023-11-22 ENCOUNTER — OFFICE VISIT (OUTPATIENT)
Dept: PHYSICAL THERAPY | Age: 63
End: 2023-11-22
Attending: ORTHOPAEDIC SURGERY
Payer: COMMERCIAL

## 2023-11-22 PROCEDURE — 97140 MANUAL THERAPY 1/> REGIONS: CPT

## 2023-11-22 PROCEDURE — 97110 THERAPEUTIC EXERCISES: CPT

## 2023-11-22 NOTE — PROGRESS NOTES
Diagnosis:   L rotator cuff repair         Referring Provider: Yosef  Date of Evaluation:    11/15/23    Precautions:  Drug Allergy Next MD visit:   11/20/23  Date of Surgery: 11/14/23   Insurance Primary/Secondary: [facility] HMO / N/A     # Auth Visits: [facility] 12 visits expires 12/31/23            Subjective: TENS seems to be helping. Less pain and sleeping better. Pain: 5/10      Objective: All of the below objective measures are from evaluation for reference:   PROM: (11/22/2023)  Shoulder  Elbow   Flexion: R 180; L 120  Abduction: R 180; L 90  ER: R 90; L 8  IR: R 55; L 120 Flexion: R 140; L 130  Extension: R 0; L -20          Assessment: Continued gains in PROM. Increased tone in UT, added STM to this area. Goals:   (To be met in 30 visits)   Pt will report improved ability to sleep without waking due to shoulder pain  Pt will improve shoulder flexion AROM to >170 degrees to be able to reach into overhead cabinets without pain or restriction  Pt will improve shoulder abduction AROM to >170 degrees to improve ability to don deodorant, don/doff shirts, and wash hair  Pt will increase shoulder AROM ER to 80 to reach and fasten seatbelt   Pt will increase shoulder AROM IR to 60 to be able to reach in back pocket, tuck in shirt, and turn steering wheel without pain  Pt will improve shoulder strength throughout to 5/5 to improve function with ADLs including lifting and reaching tasks  Pt will demonstrate increased mid/low trap strength to 5/5 to promote improved shoulder mechanics and stabilization with ADL such as lifting and reaching   Pt will be independent and compliant with comprehensive HEP to maintain progress achieved in PT     Plan: Progress per protocol  Date: 11/17/2023  TX#: 2/12 Date:  11/20/2023               TX#: 3/12 Date: 11/22/2023                TX#: 4/12 Date:                 TX#: 5/ Date:    Tx#: 6/ THERE EX:  PROM 19 mins within protocol  PROM elbow and hand and wrist 5 mins  AROM elbow, hand and wrist 5 mins  Seated AROM C spine 2 mins all planes gently THERE EX:  PROM 19 mins within protocol  PROM elbow and hand and wrist 5 mins  AROM elbow, hand and wrist 5 mins THERE EX:  PROM 19 mins within protocol  PROM elbow and hand and wrist 5 mins  AROM elbow, hand and wrist 5 mins     SELF MANAGEMENT:  Sling positioning reviewed, visual feedback of mirror before and after session 10 mins total SELF MANAGEMENT  TENS set-up and placement 8'  MANUAL THERAPY  STM L UT in sitting 10'                   HEP: Codman's pendulum      Charges: 2 ther ex, man therapy        Total Timed Treatment: 41 min  Total Treatment Time: 41 min

## 2023-11-27 ENCOUNTER — OFFICE VISIT (OUTPATIENT)
Dept: PHYSICAL THERAPY | Age: 63
End: 2023-11-27
Attending: ORTHOPAEDIC SURGERY
Payer: COMMERCIAL

## 2023-11-27 PROCEDURE — 97140 MANUAL THERAPY 1/> REGIONS: CPT

## 2023-11-27 PROCEDURE — 97110 THERAPEUTIC EXERCISES: CPT

## 2023-11-27 NOTE — PROGRESS NOTES
Diagnosis:   L rotator cuff repair         Referring Provider: Yosef  Date of Evaluation:    11/15/23    Precautions:  Drug Allergy Next MD visit:   12/18/23  Date of Surgery: 11/14/23   Insurance Primary/Secondary: [address] [place] [address] HMO / N/A     # Auth Visits: [facility] [place] 12 visits expires 12/31/23            Subjective: Seem to have turned a corner, moving better with less pain. Pain: 5/10      Objective: All of the below objective measures are from evaluation for reference:   PROM: (11/22/2023)  Shoulder  Elbow   Flexion: R 180; L 120  Abduction: R 180; L 90  ER: R 90; L 8  IR: R 55; L 120 Flexion: R 140; L 130  Extension: R 0; L 0          Assessment: Added submax isometrics to shoulder to initiate deltoid strength to aid shoulder stability. Goals:   (To be met in 30 visits)   Pt will report improved ability to sleep without waking due to shoulder pain  Pt will improve shoulder flexion AROM to >170 degrees to be able to reach into overhead cabinets without pain or restriction  Pt will improve shoulder abduction AROM to >170 degrees to improve ability to don deodorant, don/doff shirts, and wash hair  Pt will increase shoulder AROM ER to 80 to reach and fasten seatbelt   Pt will increase shoulder AROM IR to 60 to be able to reach in back pocket, tuck in shirt, and turn steering wheel without pain  Pt will improve shoulder strength throughout to 5/5 to improve function with ADLs including lifting and reaching tasks  Pt will demonstrate increased mid/low trap strength to 5/5 to promote improved shoulder mechanics and stabilization with ADL such as lifting and reaching   Pt will be independent and compliant with comprehensive HEP to maintain progress achieved in PT     Plan: Progress per protocol  Date: 11/17/2023  TX#: 2/12 Date:  11/20/2023               TX#: 3/12 Date: 11/22/2023                TX#: 4/12 Date:  11/27/2023               TX#: 5/12 Date:    Tx#: 6/   THERE EX:  PROM 19 mins within protocol  PROM elbow and hand and wrist 5 mins  AROM elbow, hand and wrist 5 mins  Seated AROM C spine 2 mins all planes gently THERE EX:  PROM 19 mins within protocol  PROM elbow and hand and wrist 5 mins  AROM elbow, hand and wrist 5 mins THERE EX:  PROM 19 mins within protocol  PROM elbow and hand and wrist 5 mins  AROM elbow, hand and wrist 5 mins THERE EX:  PROM 19 mins within protocol  PROM elbow and hand and wrist 5 mins  AROM elbow, hand and wrist 5 mins  3 way deltoid submax isometrics 5\" 1x10 ea    SELF MANAGEMENT:  Sling positioning reviewed, visual feedback of mirror before and after session 10 mins total SELF MANAGEMENT  TENS set-up and placement 8'  MANUAL THERAPY  STM L UT in sitting 10' MANUAL THERAPY  STM L UT in sitting 10'                  HEP: Codman's pendulum      Charges: 2 ther ex, man therapy        Total Timed Treatment: 45 min  Total Treatment Time: 45 min

## 2023-11-29 ENCOUNTER — OFFICE VISIT (OUTPATIENT)
Dept: PHYSICAL THERAPY | Age: 63
End: 2023-11-29
Attending: ORTHOPAEDIC SURGERY
Payer: COMMERCIAL

## 2023-11-29 PROCEDURE — 97140 MANUAL THERAPY 1/> REGIONS: CPT

## 2023-11-29 PROCEDURE — 97110 THERAPEUTIC EXERCISES: CPT

## 2023-11-29 NOTE — PROGRESS NOTES
Diagnosis:   L rotator cuff repair         Referring Provider: Yosef  Date of Evaluation:    11/15/23    Precautions:  Drug Allergy Next MD visit:   12/18/23  Date of Surgery: 11/14/23   Insurance Primary/Secondary: [address] [place] [address] HMO / N/A     # Auth Visits: [facility] [place] 12 visits expires 12/31/23            Subjective: Shoulder moving well, much less pain. Pain: 5/10      Objective: All of the below objective measures are from evaluation for reference:   PROM: (11/22/2023)  Shoulder  Elbow   Flexion: R 180; L 120  Abduction: R 180; L 90  ER: R 90; L 8  IR: R 55; L 120 Flexion: R 140; L 130  Extension: R 0; L 0          Assessment: Progressing well with ROM.       Goals:   (To be met in 30 visits)   Pt will report improved ability to sleep without waking due to shoulder pain  Pt will improve shoulder flexion AROM to >170 degrees to be able to reach into overhead cabinets without pain or restriction  Pt will improve shoulder abduction AROM to >170 degrees to improve ability to don deodorant, don/doff shirts, and wash hair  Pt will increase shoulder AROM ER to 80 to reach and fasten seatbelt   Pt will increase shoulder AROM IR to 60 to be able to reach in back pocket, tuck in shirt, and turn steering wheel without pain  Pt will improve shoulder strength throughout to 5/5 to improve function with ADLs including lifting and reaching tasks  Pt will demonstrate increased mid/low trap strength to 5/5 to promote improved shoulder mechanics and stabilization with ADL such as lifting and reaching   Pt will be independent and compliant with comprehensive HEP to maintain progress achieved in PT     Plan: Wean from sling with MD approval  Date: 11/17/2023  TX#: 2/12 Date:  11/20/2023               TX#: 3/12 Date: 11/22/2023                TX#: 4/12 Date:  11/27/2023               TX#: 5/12 Date: 11/29/2023  Tx#: 6/12   THERE EX:  PROM 19 mins within protocol  PROM elbow and hand and wrist 5 mins  AROM elbow, hand and wrist 5 mins  Seated AROM C spine 2 mins all planes gently THERE EX:  PROM 19 mins within protocol  PROM elbow and hand and wrist 5 mins  AROM elbow, hand and wrist 5 mins THERE EX:  PROM 19 mins within protocol  PROM elbow and hand and wrist 5 mins  AROM elbow, hand and wrist 5 mins THERE EX:  PROM 19 mins within protocol  PROM elbow and hand and wrist 5 mins  AROM elbow, hand and wrist 5 mins  3 way deltoid submax isometrics 5\" 1x10 ea THERE EX:  PROM 19 mins within protocol  PROM elbow and hand and wrist 5 mins  AROM elbow, hand and wrist 5 mins  3 way deltoid submax isometrics 5\" 1x10 ea   SELF MANAGEMENT:  Sling positioning reviewed, visual feedback of mirror before and after session 10 mins total SELF MANAGEMENT  TENS set-up and placement 8'  MANUAL THERAPY  STM L UT in sitting 10' MANUAL THERAPY  STM L UT in sitting 10' MANUAL THERAPY  STM L UT in sitting 10'                 HEP: Codman's pendulum      Charges: 2 ther ex, man therapy        Total Timed Treatment: 45 min  Total Treatment Time: 45 min

## 2023-12-04 ENCOUNTER — OFFICE VISIT (OUTPATIENT)
Dept: PHYSICAL THERAPY | Age: 63
End: 2023-12-04
Attending: ORTHOPAEDIC SURGERY
Payer: COMMERCIAL

## 2023-12-04 PROCEDURE — 97110 THERAPEUTIC EXERCISES: CPT

## 2023-12-04 PROCEDURE — 97140 MANUAL THERAPY 1/> REGIONS: CPT

## 2023-12-04 NOTE — PROGRESS NOTES
Diagnosis:   L rotator cuff repair         Referring Provider: Yosef  Date of Evaluation:    11/15/23    Precautions:  Drug Allergy Next MD visit:   12/18/23  Date of Surgery: 11/14/23   Insurance Primary/Secondary: [facility] HMO / N/A     # [place] [facility] [place] 12[place]31/23            Subjective: Sleeping better now, able to lie in bed. Pain: 5/10      Objective: All of the below objective measures are from evaluation for reference:   PROM: (12/4/2023)  Shoulder  Elbow   Flexion: R 180; L 170  Abduction: R 180; L 170  ER: R 90; L 70  IR: R 55; L 45 Flexion: R 140; L 130  Extension: R 0; L 0          Assessment: Near full PROM, no pain.        Goals:   (To be met in 30 visits)   Pt will report improved ability to sleep without waking due to shoulder pain  Pt will improve shoulder flexion AROM to >170 degrees to be able to reach into overhead cabinets without pain or restriction  Pt will improve shoulder abduction AROM to >170 degrees to improve ability to don deodorant, don/doff shirts, and wash hair  Pt will increase shoulder AROM ER to 80 to reach and fasten seatbelt   Pt will increase shoulder AROM IR to 60 to be able to reach in back pocket, tuck in shirt, and turn steering wheel without pain  Pt will improve shoulder strength throughout to 5/5 to improve function with ADLs including lifting and reaching tasks  Pt will demonstrate increased mid/low trap strength to 5/5 to promote improved shoulder mechanics and stabilization with ADL such as lifting and reaching   Pt will be independent and compliant with comprehensive HEP to maintain progress achieved in PT     Plan: Wean from sling with MD approval  Date: 11/17/2023  TX#: 2/12 Date:  11/20/2023               TX#: 3/12 Date: 11/22/2023                TX#: 4/12 Date:  11/27/2023               TX#: 5/12 Date: 11/29/2023  Tx#: 6/12 Date: 12/4/2023  Tx#: 7/12   THERE EX:  PROM 19 mins within protocol  PROM elbow and hand and wrist 5 mins  AROM elbow, hand and wrist 5 mins  Seated AROM C spine 2 mins all planes gently THERE EX:  PROM 19 mins within protocol  PROM elbow and hand and wrist 5 mins  AROM elbow, hand and wrist 5 mins THERE EX:  PROM 19 mins within protocol  PROM elbow and hand and wrist 5 mins  AROM elbow, hand and wrist 5 mins THERE EX:  PROM 19 mins within protocol  PROM elbow and hand and wrist 5 mins  AROM elbow, hand and wrist 5 mins  3 way deltoid submax isometrics 5\" 1x10 ea THERE EX:  PROM 19 mins within protocol  PROM elbow and hand and wrist 5 mins  AROM elbow, hand and wrist 5 mins  3 way deltoid submax isometrics 5\" 1x10 ea THERE EX:  PROM 19 mins within protocol  PROM elbow and hand and wrist 5 mins  AROM elbow, hand and wrist 5 mins  3 way deltoid submax isometrics 5\" 1x10 ea   SELF MANAGEMENT:  Sling positioning reviewed, visual feedback of mirror before and after session 10 mins total SELF MANAGEMENT  TENS set-up and placement 8'  MANUAL THERAPY  STM L UT in sitting 10' MANUAL THERAPY  STM L UT in sitting 10' MANUAL THERAPY  STM L UT in sitting 10' MANUAL THERAPY  Inf/post glide GH joint grade 3 4' ea                   HEP: Codman's pendulum      Charges: 2 ther ex, man therapy        Total Timed Treatment: 45 min  Total Treatment Time: 45 min

## 2023-12-06 ENCOUNTER — OFFICE VISIT (OUTPATIENT)
Dept: PHYSICAL THERAPY | Age: 63
End: 2023-12-06
Attending: ORTHOPAEDIC SURGERY
Payer: COMMERCIAL

## 2023-12-06 PROCEDURE — 97140 MANUAL THERAPY 1/> REGIONS: CPT

## 2023-12-06 PROCEDURE — 97110 THERAPEUTIC EXERCISES: CPT

## 2023-12-06 NOTE — PROGRESS NOTES
Diagnosis:   L rotator cuff repair         Referring Provider: Yosef  Date of Evaluation:    11/15/23    Precautions:  Drug Allergy Next MD visit:   12/18/23  Date of Surgery: 11/14/23   Insurance Primary/Secondary: [address] [place] [address] HMO / N/A     # Auth Visits: [facility] [place] 12 visits expires 12/31/23            Subjective: Continued ROM gains and decreased . Pain: 3/10      Objective:     PROM: (12/4/2023)  Shoulder  Elbow   Flexion: R 180; L 170  Abduction: R 180; L 170  ER: R 90; L 70  IR: R 55; L 45 Flexion: R 140; L 130  Extension: R 0; L 0          Assessment: Contacted MD about sling dc and was advised to continue with sling due to size of repair.        Goals:   (To be met in 30 visits)   Pt will report improved ability to sleep without waking due to shoulder pain  Pt will improve shoulder flexion AROM to >170 degrees to be able to reach into overhead cabinets without pain or restriction  Pt will improve shoulder abduction AROM to >170 degrees to improve ability to don deodorant, don/doff shirts, and wash hair  Pt will increase shoulder AROM ER to 80 to reach and fasten seatbelt   Pt will increase shoulder AROM IR to 60 to be able to reach in back pocket, tuck in shirt, and turn steering wheel without pain  Pt will improve shoulder strength throughout to 5/5 to improve function with ADLs including lifting and reaching tasks  Pt will demonstrate increased mid/low trap strength to 5/5 to promote improved shoulder mechanics and stabilization with ADL such as lifting and reaching   Pt will be independent and compliant with comprehensive HEP to maintain progress achieved in PT     Plan: Check response to AAROM  Date:  11/20/2023               TX#: 3/12 Date: 11/22/2023                TX#: 4/12 Date:  11/27/2023               TX#: 5/12 Date: 11/29/2023  Tx#: 6/12 Date: 12/4/2023  Tx#: 7/12 Date: 12/6/2023  Tx#: 8/12   THERE EX:  PROM 19 mins within protocol  PROM elbow and hand and wrist 5 mins  AROM elbow, hand and wrist 5 mins THERE EX:  PROM 19 mins within protocol  PROM elbow and hand and wrist 5 mins  AROM elbow, hand and wrist 5 mins THERE EX:  PROM 19 mins within protocol  PROM elbow and hand and wrist 5 mins  AROM elbow, hand and wrist 5 mins  3 way deltoid submax isometrics 5\" 1x10 ea THERE EX:  PROM 19 mins within protocol  PROM elbow and hand and wrist 5 mins  AROM elbow, hand and wrist 5 mins  3 way deltoid submax isometrics 5\" 1x10 ea THERE EX:  PROM 19 mins within protocol  PROM elbow and hand and wrist 5 mins  AROM elbow, hand and wrist 5 mins  3 way deltoid submax isometrics 5\" 1x10 ea THERE EX:  PROM 19 mins within protocol  PROM elbow and hand and wrist 5 mins  AROM elbow, hand and wrist 5 mins  3 way deltoid submax isometrics 5\" 1x10 ea  Wand AAROM press x10  Wand AAROM supine flex x10   SELF MANAGEMENT  TENS set-up and placement 8'  MANUAL THERAPY  STM L UT in sitting 10' MANUAL THERAPY  STM L UT in sitting 10' MANUAL THERAPY  STM L UT in sitting 10' MANUAL THERAPY  Inf/post glide GH joint grade 3 4' ea MANUAL THERAPY  STM L UT in sitting 10'                   HEP: Codman's pendulum      Charges: 2 ther ex, man therapy        Total Timed Treatment: 45 min  Total Treatment Time: 45 min

## 2023-12-11 ENCOUNTER — OFFICE VISIT (OUTPATIENT)
Dept: PHYSICAL THERAPY | Age: 63
End: 2023-12-11
Attending: ORTHOPAEDIC SURGERY
Payer: COMMERCIAL

## 2023-12-11 PROCEDURE — 97140 MANUAL THERAPY 1/> REGIONS: CPT

## 2023-12-11 PROCEDURE — 97110 THERAPEUTIC EXERCISES: CPT

## 2023-12-11 NOTE — PROGRESS NOTES
Diagnosis:   L rotator cuff repair         Referring Provider: Yosef  Date of Evaluation:    11/15/23    Precautions:  Drug Allergy Next MD visit:   12/18/23  Date of Surgery: 11/14/23   Insurance Primary/Secondary: [employer] HMO / N/A     # Auth Visits: O 12 visits expires 12/31/23            Subjective: Continued decrease in pain and increase in mobility. Pain: 3/10      Objective:     PROM: (12/4/2023)  Shoulder  Elbow   Flexion: R 180; L 170  Abduction: R 180; L 170  ER: R 90; L 70  IR: R 55; L 45 Flexion: R 140; L 130  Extension: R 0; L 0          Assessment: Good response to AAROM. Instructed AAROM wand flex, press for HEP.        Goals:   (To be met in 30 visits)   Pt will report improved ability to sleep without waking due to shoulder pain  Pt will improve shoulder flexion AROM to >170 degrees to be able to reach into overhead cabinets without pain or restriction  Pt will improve shoulder abduction AROM to >170 degrees to improve ability to don deodorant, don/doff shirts, and wash hair  Pt will increase shoulder AROM ER to 80 to reach and fasten seatbelt   Pt will increase shoulder AROM IR to 60 to be able to reach in back pocket, tuck in shirt, and turn steering wheel without pain  Pt will improve shoulder strength throughout to 5/5 to improve function with ADLs including lifting and reaching tasks  Pt will demonstrate increased mid/low trap strength to 5/5 to promote improved shoulder mechanics and stabilization with ADL such as lifting and reaching   Pt will be independent and compliant with comprehensive HEP to maintain progress achieved in PT     Plan: Progress per protocol  Date: 11/22/2023                TX#: 4/12 Date:  11/27/2023               TX#: 5/12 Date: 11/29/2023  Tx#: 6/12 Date: 12/4/2023  Tx#: 7/12 Date: 12/6/2023  Tx#: 8/12 Date: 12/11/2023  Tx#: 9/12   THERE EX:  PROM 19 mins within protocol  PROM elbow and hand and wrist 5 mins  AROM elbow, hand and wrist 5 mins THERE EX:  PROM 19 mins within protocol  PROM elbow and hand and wrist 5 mins  AROM elbow, hand and wrist 5 mins  3 way deltoid submax isometrics 5\" 1x10 ea THERE EX:  PROM 19 mins within protocol  PROM elbow and hand and wrist 5 mins  AROM elbow, hand and wrist 5 mins  3 way deltoid submax isometrics 5\" 1x10 ea THERE EX:  PROM 19 mins within protocol  PROM elbow and hand and wrist 5 mins  AROM elbow, hand and wrist 5 mins  3 way deltoid submax isometrics 5\" 1x10 ea THERE EX:  PROM 19 mins within protocol  PROM elbow and hand and wrist 5 mins  AROM elbow, hand and wrist 5 mins  3 way deltoid submax isometrics 5\" 1x10 ea  Wand AAROM press x10  Wand AAROM supine flex x10 THERE EX:  PROM 19 mins within protocol  PROM elbow and hand and wrist 5 mins  AROM elbow, hand and wrist 5 mins  3 way deltoid submax isometrics 5\" 1x10 ea  Wand AAROM press 2x10  Wand AAROM supine flex 2x10   MANUAL THERAPY  STM L UT in sitting 10' MANUAL THERAPY  STM L UT in sitting 10' MANUAL THERAPY  STM L UT in sitting 10' MANUAL THERAPY  Inf/post glide GH joint grade 3 4' ea MANUAL THERAPY  STM L UT in sitting 10' MANUAL THERAPY  STM L UT in sitting 10'                   HEP: Codman's pendulum      Charges: 2 ther ex, man therapy        Total Timed Treatment: 45 min  Total Treatment Time: 45 min

## 2023-12-13 ENCOUNTER — APPOINTMENT (OUTPATIENT)
Dept: PHYSICAL THERAPY | Age: 63
End: 2023-12-13
Attending: ORTHOPAEDIC SURGERY
Payer: COMMERCIAL

## 2023-12-14 ENCOUNTER — OFFICE VISIT (OUTPATIENT)
Dept: PHYSICAL THERAPY | Age: 63
End: 2023-12-14
Attending: ORTHOPAEDIC SURGERY
Payer: COMMERCIAL

## 2023-12-14 ENCOUNTER — APPOINTMENT (OUTPATIENT)
Dept: PHYSICAL THERAPY | Age: 63
End: 2023-12-14
Attending: ORTHOPAEDIC SURGERY
Payer: COMMERCIAL

## 2023-12-14 PROCEDURE — 97110 THERAPEUTIC EXERCISES: CPT

## 2023-12-14 PROCEDURE — 97140 MANUAL THERAPY 1/> REGIONS: CPT

## 2023-12-18 ENCOUNTER — OFFICE VISIT (OUTPATIENT)
Dept: ORTHOPEDICS CLINIC | Facility: CLINIC | Age: 63
End: 2023-12-18
Payer: COMMERCIAL

## 2023-12-18 ENCOUNTER — OFFICE VISIT (OUTPATIENT)
Dept: PHYSICAL THERAPY | Age: 63
End: 2023-12-18
Attending: ORTHOPAEDIC SURGERY
Payer: COMMERCIAL

## 2023-12-18 DIAGNOSIS — Z98.890 S/P ARTHROSCOPY OF SHOULDER: Primary | ICD-10-CM

## 2023-12-18 PROCEDURE — 97110 THERAPEUTIC EXERCISES: CPT

## 2023-12-18 PROCEDURE — 99024 POSTOP FOLLOW-UP VISIT: CPT | Performed by: PHYSICIAN ASSISTANT

## 2023-12-18 PROCEDURE — 97140 MANUAL THERAPY 1/> REGIONS: CPT

## 2023-12-18 RX ORDER — MELOXICAM 15 MG/1
15 TABLET ORAL DAILY
Qty: 30 TABLET | Refills: 0 | Status: SHIPPED | OUTPATIENT
Start: 2023-12-18 | End: 2024-01-17

## 2023-12-18 NOTE — PROGRESS NOTES
Diagnosis:   L rotator cuff repair         Referring Provider: Yosef  Date of Evaluation:    11/15/23    Precautions:  Drug Allergy Next MD visit:   12/18/23  Date of Surgery: 11/14/23   Insurance Primary/Secondary: [address] [place] [address] HMO / N/A     # Auth Visits: O 12 visits expires 12/31/23            Subjective: Minimal pain and MD happy with ROM progress. Pain: 2/10      Objective:     PROM: (12/14/2023)  Shoulder  Elbow   Flexion: R 180; L 180  Abduction: R 180; L 170  ER: R 90; L 80  IR: R 55; L 45 Flexion: R 140; L 130  Extension: R 0; L 0          Assessment: ROM continues to progress well. Added AROM ER.        Goals:   (To be met in 30 visits)   Pt will report improved ability to sleep without waking due to shoulder pain  Pt will improve shoulder flexion AROM to >170 degrees to be able to reach into overhead cabinets without pain or restriction  Pt will improve shoulder abduction AROM to >170 degrees to improve ability to don deodorant, don/doff shirts, and wash hair  Pt will increase shoulder AROM ER to 80 to reach and fasten seatbelt   Pt will increase shoulder AROM IR to 60 to be able to reach in back pocket, tuck in shirt, and turn steering wheel without pain  Pt will improve shoulder strength throughout to 5/5 to improve function with ADLs including lifting and reaching tasks  Pt will demonstrate increased mid/low trap strength to 5/5 to promote improved shoulder mechanics and stabilization with ADL such as lifting and reaching   Pt will be independent and compliant with comprehensive HEP to maintain progress achieved in PT   Post QuickDASH Outcome Score  Post Score: 31.82 % (12/14/2023  2:54 PM)    52.27 % improvement    Plan: Progress per protocol    Certification From: [id number]  To:3/13/2024    Date: 12/4/2023  Tx#: 7/12 Date: 12/6/2023  Tx#: 8/12 Date: 12/11/2023  Tx#: 9/12 Date: 12/14/2023  Tx#: 10/12 Date: 12/18/2023  Tx#: 11/12   THERE EX:  PROM 19 mins within protocol  PROM elbow and hand and wrist 5 mins  AROM elbow, hand and wrist 5 mins  3 way deltoid submax isometrics 5\" 1x10 ea THERE EX:  PROM 19 mins within protocol  PROM elbow and hand and wrist 5 mins  AROM elbow, hand and wrist 5 mins  3 way deltoid submax isometrics 5\" 1x10 ea  Wand AAROM press x10  Wand AAROM supine flex x10 THERE EX:  PROM 19 mins within protocol  PROM elbow and hand and wrist 5 mins  AROM elbow, hand and wrist 5 mins  3 way deltoid submax isometrics 5\" 1x10 ea  Wand AAROM press 2x10  Wand AAROM supine flex 2x10 THERE EX:  PROM 19 mins within protocol  PROM elbow and hand and wrist 5 mins  AROM elbow, hand and wrist 5 mins  3 way deltoid submax isometrics 5\" 1x10 ea  Wand AAROM press 2x10  Wand AAROM supine flex 2x10 THERE EX:  PROM 19 mins within protocol  PROM elbow and hand and wrist 5 mins  AROM elbow, hand and wrist 5 mins  3 way deltoid submax isometrics 5\" 1x10 ea  Wand AAROM press 2x10  Wand AAROM supine flex 2x10  SL AROM ER   MANUAL THERAPY  Inf/post glide GH joint grade 3 4' ea MANUAL THERAPY  STM L UT in sitting 10' MANUAL THERAPY  STM L UT in sitting 10' MANUAL THERAPY  STM L UT in sitting 10' MANUAL THERAPY  STM L UT in sitting 10'                 HEP: Jerry's pendulum , eder, wand AAROM     Charges: 2 ther ex, man therapy        Total Timed Treatment: 45 min  Total Treatment Time: 45 min

## 2023-12-20 ENCOUNTER — OFFICE VISIT (OUTPATIENT)
Dept: PHYSICAL THERAPY | Age: 63
End: 2023-12-20
Attending: ORTHOPAEDIC SURGERY
Payer: COMMERCIAL

## 2023-12-20 PROCEDURE — 97140 MANUAL THERAPY 1/> REGIONS: CPT

## 2023-12-20 PROCEDURE — 97110 THERAPEUTIC EXERCISES: CPT

## 2023-12-20 NOTE — PROGRESS NOTES
Diagnosis:   L rotator cuff repair         Referring Provider: Yosef  Date of Evaluation:    11/15/23    Precautions:  Drug Allergy Next MD visit:   12/18/23  Date of Surgery: 11/14/23   Insurance Primary/Secondary: [address] [place] [address] HMO / N/A     # Auth Visits: [facility] [place] 12 visits expires 12/31/23            Subjective: Feel crepitus in shoulder with AAROM activity, but no pain. Pain: 2/10      Objective:     PROM: (12/14/2023)  Shoulder  Elbow   Flexion: R 180; L 180  Abduction: R 180; L 170  ER: R 90; L 80  IR: R 55; L 45 Flexion: R 140; L 130  Extension: R 0; L 0          Assessment: Decreased tone and tenderness in L UT. Tolerates all AAROM activity well.       Goals:   (To be met in 30 visits)   Pt will report improved ability to sleep without waking due to shoulder pain  Pt will improve shoulder flexion AROM to >170 degrees to be able to reach into overhead cabinets without pain or restriction  Pt will improve shoulder abduction AROM to >170 degrees to improve ability to don deodorant, don/doff shirts, and wash hair  Pt will increase shoulder AROM ER to 80 to reach and fasten seatbelt   Pt will increase shoulder AROM IR to 60 to be able to reach in back pocket, tuck in shirt, and turn steering wheel without pain  Pt will improve shoulder strength throughout to 5/5 to improve function with ADLs including lifting and reaching tasks  Pt will demonstrate increased mid/low trap strength to 5/5 to promote improved shoulder mechanics and stabilization with ADL such as lifting and reaching   Pt will be independent and compliant with comprehensive HEP to maintain progress achieved in PT   Post QuickDASH Outcome Score  Post Score: 31.82 % (12/14/2023  2:54 PM)    52.27 % improvement    Plan: Progress per protocol    Certification From: 38/[id number]/2092  To:3/13/2024    Date: 12/4/2023  Tx#: 7/12 Date: 12/6/2023  Tx#: 8/12 Date: 12/11/2023  Tx#: 9/12 Date: 12/14/2023  Tx#: 10/12 Date: 12/18/2023  Tx#: 11/12 Date: 12/20/2023  Tx#: 12/12 THERE EX:  PROM 19 mins within protocol  PROM elbow and hand and wrist 5 mins  AROM elbow, hand and wrist 5 mins  3 way deltoid submax isometrics 5\" 1x10 ea THERE EX:  PROM 19 mins within protocol  PROM elbow and hand and wrist 5 mins  AROM elbow, hand and wrist 5 mins  3 way deltoid submax isometrics 5\" 1x10 ea  Wand AAROM press x10  Wand AAROM supine flex x10 THERE EX:  PROM 19 mins within protocol  PROM elbow and hand and wrist 5 mins  AROM elbow, hand and wrist 5 mins  3 way deltoid submax isometrics 5\" 1x10 ea  Wand AAROM press 2x10  Wand AAROM supine flex 2x10 THERE EX:  PROM 19 mins within protocol  PROM elbow and hand and wrist 5 mins  AROM elbow, hand and wrist 5 mins  3 way deltoid submax isometrics 5\" 1x10 ea  Wand AAROM press 2x10  Wand AAROM supine flex 2x10 THERE EX:  PROM 19 mins within protocol  PROM elbow and hand and wrist 5 mins  AROM elbow, hand and wrist 5 mins  3 way deltoid submax isometrics 5\" 1x10 ea  Wand AAROM press 2x10  Wand AAROM supine flex 2x10  SL AROM ER THERE EX:  PROM 19 mins within protocol  3 way deltoid submax isometrics 5\" 1x10 ea  Wand AAROM press 2x10  Wand AAROM supine flex 2x10  SL AROM ER A 2x10   MANUAL THERAPY  Inf/post glide GH joint grade 3 4' ea MANUAL THERAPY  STM L UT in sitting 10' MANUAL THERAPY  STM L UT in sitting 10' MANUAL THERAPY  STM L UT in sitting 10' MANUAL THERAPY  STM L UT in sitting 10' MANUAL THERAPY  STM L UT in sitting 10'                   HEP: Codman's pendulum , isometrics, wand AAROM     Charges: 2 ther ex, man therapy        Total Timed Treatment: 45 min  Total Treatment Time: 45 min

## 2023-12-27 ENCOUNTER — OFFICE VISIT (OUTPATIENT)
Dept: PHYSICAL THERAPY | Age: 63
End: 2023-12-27
Attending: ORTHOPAEDIC SURGERY
Payer: COMMERCIAL

## 2023-12-27 PROCEDURE — 97110 THERAPEUTIC EXERCISES: CPT

## 2023-12-27 PROCEDURE — 97140 MANUAL THERAPY 1/> REGIONS: CPT

## 2023-12-27 NOTE — PROGRESS NOTES
Diagnosis:   L rotator cuff repair         Referring Provider: Yosef  Date of Evaluation:    11/15/23    Precautions:  Drug Allergy Next MD visit:   12/18/23  Date of Surgery: 11/14/23   Insurance Primary/Secondary: [address] [place] [address] HMO / N/A     # Auth Visits: O 12 visits expires 12/31/23            Subjective: Feel increased tone and tenderness in UT. Pain: 2/10      Objective:     PROM: (12/14/2023)  Shoulder  Elbow   Flexion: R 180; L 180  Abduction: R 180; L 170  ER: R 90; L 80  IR: R 55; L 45 Flexion: R 140; L 130  Extension: R 0; L 0          Assessment: Increased tone and tension in the UT. Fatigues easily with AROM.        Goals:   (To be met in 30 visits)   Pt will report improved ability to sleep without waking due to shoulder pain  Pt will improve shoulder flexion AROM to >170 degrees to be able to reach into overhead cabinets without pain or restriction  Pt will improve shoulder abduction AROM to >170 degrees to improve ability to don deodorant, don/doff shirts, and wash hair  Pt will increase shoulder AROM ER to 80 to reach and fasten seatbelt   Pt will increase shoulder AROM IR to 60 to be able to reach in back pocket, tuck in shirt, and turn steering wheel without pain  Pt will improve shoulder strength throughout to 5/5 to improve function with ADLs including lifting and reaching tasks  Pt will demonstrate increased mid/low trap strength to 5/5 to promote improved shoulder mechanics and stabilization with ADL such as lifting and reaching   Pt will be independent and compliant with comprehensive HEP to maintain progress achieved in PT   Post QuickDASH Outcome Score  Post Score: 31.82 % (12/14/2023  2:54 PM)    52.27 % improvement    Plan: Progress per protocol    Certification From: 80/43/5263  To:3/13/2024    Date: 12/6/2023  Tx#: 8/12 Date: 12/11/2023  Tx#: 9/12 Date: 12/14/2023  Tx#: 10/12 Date: 12/18/2023  Tx#: 11/12 Date: 12/20/2023  Tx#: 12/12 Date: 12/27/2023  Tx#: 13/13   THERE EX:  PROM 19 mins within protocol  PROM elbow and hand and wrist 5 mins  AROM elbow, hand and wrist 5 mins  3 way deltoid submax isometrics 5\" 1x10 ea  Wand AAROM press x10  Wand AAROM supine flex x10 THERE EX:  PROM 19 mins within protocol  PROM elbow and hand and wrist 5 mins  AROM elbow, hand and wrist 5 mins  3 way deltoid submax isometrics 5\" 1x10 ea  Wand AAROM press 2x10  Wand AAROM supine flex 2x10 THERE EX:  PROM 19 mins within protocol  PROM elbow and hand and wrist 5 mins  AROM elbow, hand and wrist 5 mins  3 way deltoid submax isometrics 5\" 1x10 ea  Wand AAROM press 2x10  Wand AAROM supine flex 2x10 THERE EX:  PROM 19 mins within protocol  PROM elbow and hand and wrist 5 mins  AROM elbow, hand and wrist 5 mins  3 way deltoid submax isometrics 5\" 1x10 ea  Wand AAROM press 2x10  Wand AAROM supine flex 2x10  SL AROM ER THERE EX:  PROM 19 mins within protocol  3 way deltoid submax isometrics 5\" 1x10 ea  Wand AAROM press 2x10  Wand AAROM supine flex 2x10  SL AROM ER A 2x10 THERE EX:  PROM 19 mins within protocol  3 way deltoid submax isometrics 5\" 1x10 ea  Wand AAROM press 2x10  Wand AAROM supine flex 2x10  SL AROM ER A 2x10  SL abd 1x10   MANUAL THERAPY  STM L UT in sitting 10' MANUAL THERAPY  STM L UT in sitting 10' MANUAL THERAPY  STM L UT in sitting 10' MANUAL THERAPY  STM L UT in sitting 10' MANUAL THERAPY  STM L UT in sitting 10' MANUAL THERAPY  STM L UT in sitting 10'                   HEP: Codman's pendulum , isometrics, wand AAROM     Charges: 2 ther ex, man therapy        Total Timed Treatment: 45 min  Total Treatment Time: 45 min

## 2024-01-03 ENCOUNTER — OFFICE VISIT (OUTPATIENT)
Dept: PHYSICAL THERAPY | Age: 64
End: 2024-01-03
Attending: ORTHOPAEDIC SURGERY
Payer: COMMERCIAL

## 2024-01-03 PROCEDURE — 97140 MANUAL THERAPY 1/> REGIONS: CPT

## 2024-01-03 PROCEDURE — 97110 THERAPEUTIC EXERCISES: CPT

## 2024-01-03 NOTE — PROGRESS NOTES
Diagnosis:   L rotator cuff repair         Referring Provider: Yosef  Date of Evaluation:    11/15/23    Precautions:  Drug Allergy Next MD visit:   12/18/23  Date of Surgery: 11/14/23   Insurance Primary/Secondary[place] [employer] / N/A     # Auth Visits: HMO 8 expires 3/30/24          Subjective: Doing water aerobics with very little pain.     Pain: 2/10      Objective:     PROM: (12/14/2023)  Shoulder  Elbow   Flexion: R 180; L 180  Abduction: R 180; L 170  ER: R 90; L 80  IR: R 55; L 45 Flexion: R 140; L 130  Extension: R 0; L 0          Assessment: Continued crepitus in shoulder with AAROM activity. Added prone shoulder extension to increase posterior chain strength.       Goals:   (To be met in 30 visits)   Pt will report improved ability to sleep without waking due to shoulder pain  Pt will improve shoulder flexion AROM to >170 degrees to be able to reach into overhead cabinets without pain or restriction  Pt will improve shoulder abduction AROM to >170 degrees to improve ability to don deodorant, don/doff shirts, and wash hair  Pt will increase shoulder AROM ER to 80 to reach and fasten seatbelt   Pt will increase shoulder AROM IR to 60 to be able to reach in back pocket, tuck in shirt, and turn steering wheel without pain  Pt will improve shoulder strength throughout to 5/5 to improve function with ADLs including lifting and reaching tasks  Pt will demonstrate increased mid/low trap strength to 5/5 to promote improved shoulder mechanics and stabilization with ADL such as lifting and reaching   Pt will be independent and compliant with comprehensive HEP to maintain progress achieved in PT   Post QuickDASH Outcome Score  Post Score: 31.82 % (12/14/2023  2:54 PM)    52.27 % improvement    Plan: Progress per protocol    Certification From: 12/14/2023  To:3/13/2024    Date: 12/11/2023  Tx#: 9/12 Date: 12/14/2023  Tx#: 10/12 Date: 12/18/2023  Tx#: 11/12 Date: 12/20/2023  Tx#: 12/12 Date: 12/27/2023  Tx#: 13/13  Date: 1/3/2024  Tx#: 1/8   THERE EX:  PROM 19 mins within protocol  PROM elbow and hand and wrist 5 mins  AROM elbow, hand and wrist 5 mins  3 way deltoid submax isometrics 5\" 1x10 ea  Wand AAROM press 2x10  Wand AAROM supine flex 2x10 THERE EX:  PROM 19 mins within protocol  PROM elbow and hand and wrist 5 mins  AROM elbow, hand and wrist 5 mins  3 way deltoid submax isometrics 5\" 1x10 ea  Wand AAROM press 2x10  Wand AAROM supine flex 2x10 THERE EX:  PROM 19 mins within protocol  PROM elbow and hand and wrist 5 mins  AROM elbow, hand and wrist 5 mins  3 way deltoid submax isometrics 5\" 1x10 ea  Wand AAROM press 2x10  Wand AAROM supine flex 2x10  SL AROM ER THERE EX:  PROM 19 mins within protocol  3 way deltoid submax isometrics 5\" 1x10 ea  Wand AAROM press 2x10  Wand AAROM supine flex 2x10  SL AROM ER A 2x10 THERE EX:  PROM 19 mins within protocol  3 way deltoid submax isometrics 5\" 1x10 ea  Wand AAROM press 2x10  Wand AAROM supine flex 2x10  SL AROM ER A 2x10  SL abd 1x10 THERE EX:  PROM 19 mins within protocol  3 way deltoid submax isometrics 5\" 1x10 ea  Wand AAROM press 2x10  Wand AAROM supine flex 2x10  SL AROM ER A 2x10  SL abd AA 1x10  Prone ext A 2x10   MANUAL THERAPY  STM L UT in sitting 10' MANUAL THERAPY  STM L UT in sitting 10' MANUAL THERAPY  STM L UT in sitting 10' MANUAL THERAPY  STM L UT in sitting 10' MANUAL THERAPY  STM L UT in sitting 10' MANUAL THERAPY  STM L UT in sitting 10'                   HEP: Codman's pendulum , isometrics, wand AAROM     Charges: 2 ther ex, man therapy        Total Timed Treatment: 45 min  Total Treatment Time: 45 min

## 2024-01-05 ENCOUNTER — TELEPHONE (OUTPATIENT)
Dept: FAMILY MEDICINE CLINIC | Facility: CLINIC | Age: 64
End: 2024-01-05

## 2024-01-05 NOTE — TELEPHONE ENCOUNTER
Letter mailed to patient reminding her she has outstanding orders.    Lab Frequency Next Occurrence   Hemoglobin A1C [E] Once 10/12/2023

## 2024-01-08 ENCOUNTER — OFFICE VISIT (OUTPATIENT)
Dept: PHYSICAL THERAPY | Age: 64
End: 2024-01-08
Attending: ORTHOPAEDIC SURGERY
Payer: COMMERCIAL

## 2024-01-08 PROCEDURE — 97140 MANUAL THERAPY 1/> REGIONS: CPT

## 2024-01-08 PROCEDURE — 97110 THERAPEUTIC EXERCISES: CPT

## 2024-01-08 NOTE — PROGRESS NOTES
Diagnosis:   L rotator cuff repair         Referring Provider: Yosef  Date of Evaluation:    11/15/23    Precautions:  Drug Allergy Next MD visit:   12/18/23  Date of Surgery: 11/14/23   Insurance Primary/Secondary: [place] / N/A     # Auth Visits: HMO 8 expires 3/30/24          Subjective: Moving better overall, still feels pretty crunchy.    Pain: 2/10      Objective:     PROM: (12/14/2023)  Shoulder  Elbow   Flexion: R 180; L 180  Abduction: R 180; L 170  ER: R 90; L 80  IR: R 55; L 45 Flexion: R 140; L 130  Extension: R 0; L 0          Assessment: Decreased tension and tone in the UT. Works to fatigue with AROM activity.       Goals:   (To be met in 30 visits)   Pt will report improved ability to sleep without waking due to shoulder pain  Pt will improve shoulder flexion AROM to >170 degrees to be able to reach into overhead cabinets without pain or restriction  Pt will improve shoulder abduction AROM to >170 degrees to improve ability to don deodorant, don/doff shirts, and wash hair  Pt will increase shoulder AROM ER to 80 to reach and fasten seatbelt   Pt will increase shoulder AROM IR to 60 to be able to reach in back pocket, tuck in shirt, and turn steering wheel without pain  Pt will improve shoulder strength throughout to 5/5 to improve function with ADLs including lifting and reaching tasks  Pt will demonstrate increased mid/low trap strength to 5/5 to promote improved shoulder mechanics and stabilization with ADL such as lifting and reaching   Pt will be independent and compliant with comprehensive HEP to maintain progress achieved in PT   Post QuickDASH Outcome Score  Post Score: 31.82 % (12/14/2023  2:54 PM)    52.27 % improvement    Plan: Progress per protocol    Certification From: 12/14/2023  To:3/13/2024    Date: 12/14/2023  Tx#: 10/12 Date: 12/18/2023  Tx#: 11/12 Date: 12/20/2023  Tx#: 12/12 Date: 12/27/2023  Tx#: 13/13 Date: 1/3/2024  Tx#: 1/8 Date: 1/8/2024  Tx#: 2/8   THERE EX:  PROM 19  mins within protocol  PROM elbow and hand and wrist 5 mins  AROM elbow, hand and wrist 5 mins  3 way deltoid submax isometrics 5\" 1x10 ea  Wand AAROM press 2x10  Wand AAROM supine flex 2x10 THERE EX:  PROM 19 mins within protocol  PROM elbow and hand and wrist 5 mins  AROM elbow, hand and wrist 5 mins  3 way deltoid submax isometrics 5\" 1x10 ea  Wand AAROM press 2x10  Wand AAROM supine flex 2x10  SL AROM ER THERE EX:  PROM 19 mins within protocol  3 way deltoid submax isometrics 5\" 1x10 ea  Wand AAROM press 2x10  Wand AAROM supine flex 2x10  SL AROM ER A 2x10 THERE EX:  PROM 19 mins within protocol  3 way deltoid submax isometrics 5\" 1x10 ea  Wand AAROM press 2x10  Wand AAROM supine flex 2x10  SL AROM ER A 2x10  SL abd 1x10 THERE EX:  PROM 19 mins within protocol  3 way deltoid submax isometrics 5\" 1x10 ea  Wand AAROM press 2x10  Wand AAROM supine flex 2x10  SL AROM ER A 2x10  SL abd AA 1x10  Prone ext A 2x10 THERE EX:  PROM 19 mins within protocol  3 way deltoid submax isometrics 5\" 1x10 ea  Wand AAROM press 2x10  Wand AAROM supine flex 2x10  SL AROM ER A 2x10  SL abd AA 1x10  Prone ext 2x10   MANUAL THERAPY  STM L UT in sitting 10' MANUAL THERAPY  STM L UT in sitting 10' MANUAL THERAPY  STM L UT in sitting 10' MANUAL THERAPY  STM L UT in sitting 10' MANUAL THERAPY  STM L UT in sitting 10' MANUAL THERAPY  STM L UT in sitting 10'                   HEP: Codman's pendulum , isometrics, wand AAROM     Charges: 2 ther ex, man therapy        Total Timed Treatment: 45 min  Total Treatment Time: 45 min

## 2024-01-10 ENCOUNTER — OFFICE VISIT (OUTPATIENT)
Dept: PHYSICAL THERAPY | Age: 64
End: 2024-01-10
Attending: ORTHOPAEDIC SURGERY
Payer: COMMERCIAL

## 2024-01-10 PROCEDURE — 97110 THERAPEUTIC EXERCISES: CPT

## 2024-01-10 NOTE — PROGRESS NOTES
Diagnosis:   L rotator cuff repair         Referring Provider: Yosef  Date of Evaluation:    11/15/23    Precautions:  Drug Allergy Next MD visit:   12/18/23  Date of Surgery: 11/14/23   Insurance Primary/Secondary[place] / N/A     # Auth Visits: HMO 8 expires 3/30/24          Subjective: Shoulder moving better, water exercises going well.     Pain: 2/10      Objective:     PROM: (12/14/2023)  Shoulder  Elbow   Flexion: R 180; L 180  Abduction: R 180; L 170  ER: R 90; L 80  IR: R 55; L 45 Flexion: R 140; L 130  Extension: R 0; L 0          Assessment: Decreased tension and tone in the UT. Well challenged by AROM, no pain with AROM activity.       Goals:   (To be met in 30 visits)   Pt will report improved ability to sleep without waking due to shoulder pain  Pt will improve shoulder flexion AROM to >170 degrees to be able to reach into overhead cabinets without pain or restriction  Pt will improve shoulder abduction AROM to >170 degrees to improve ability to don deodorant, don/doff shirts, and wash hair  Pt will increase shoulder AROM ER to 80 to reach and fasten seatbelt   Pt will increase shoulder AROM IR to 60 to be able to reach in back pocket, tuck in shirt, and turn steering wheel without pain  Pt will improve shoulder strength throughout to 5/5 to improve function with ADLs including lifting and reaching tasks  Pt will demonstrate increased mid/low trap strength to 5/5 to promote improved shoulder mechanics and stabilization with ADL such as lifting and reaching   Pt will be independent and compliant with comprehensive HEP to maintain progress achieved in PT   Post QuickDASH Outcome Score  Post Score: 31.82 % (12/14/2023  2:54 PM)    52.27 % improvement    Plan: Progress per protocol    Certification From: 12/14/2023  To:3/13/2024    Date: 12/18/2023  Tx#: 11/12 Date: 12/20/2023  Tx#: 12/12 Date: 12/27/2023  Tx#: 13/13 Date: 1/3/2024  Tx#: 1/8 Date: 1/8/2024  Tx#: 2/8 Date: 1/10/2024  Tx#: 3/8   THERE  EX:  PROM 19 mins within protocol  PROM elbow and hand and wrist 5 mins  AROM elbow, hand and wrist 5 mins  3 way deltoid submax isometrics 5\" 1x10 ea  Wand AAROM press 2x10  Wand AAROM supine flex 2x10  SL AROM ER THERE EX:  PROM 19 mins within protocol  3 way deltoid submax isometrics 5\" 1x10 ea  Wand AAROM press 2x10  Wand AAROM supine flex 2x10  SL AROM ER A 2x10 THERE EX:  PROM 19 mins within protocol  3 way deltoid submax isometrics 5\" 1x10 ea  Wand AAROM press 2x10  Wand AAROM supine flex 2x10  SL AROM ER A 2x10  SL abd 1x10 THERE EX:  PROM 19 mins within protocol  3 way deltoid submax isometrics 5\" 1x10 ea  Wand AAROM press 2x10  Wand AAROM supine flex 2x10  SL AROM ER A 2x10  SL abd AA 1x10  Prone ext A 2x10 THERE EX:  PROM 19 mins within protocol  3 way deltoid submax isometrics 5\" 1x10 ea  Wand AAROM press 2x10  Wand AAROM supine flex 2x10  SL AROM ER A 2x10  SL abd AA 1x10  Prone ext 2x10 THERE EX:  PROM 19 mins within protocol  3 way deltoid submax isometrics 5\" 1x10 ea  Wand AAROM press 2x10  Wand AAROM supine flex 2x10  SL AROM ER A 2x10  SL abd AA 1x10  Prone ext 2x10   MANUAL THERAPY  STM L UT in sitting 10' MANUAL THERAPY  STM L UT in sitting 10' MANUAL THERAPY  STM L UT in sitting 10' MANUAL THERAPY  STM L UT in sitting 10' MANUAL THERAPY  STM L UT in sitting 10' HELD                   HEP: Codman's pendulum , isometrics, wand AAROM     Charges: 3 ther ex     Total Timed Treatment: 38 min  Total Treatment Time: 38 min

## 2024-01-12 DIAGNOSIS — Z98.890 S/P ARTHROSCOPY OF SHOULDER: ICD-10-CM

## 2024-01-12 RX ORDER — MELOXICAM 15 MG/1
15 TABLET ORAL
Qty: 30 TABLET | Refills: 0 | Status: SHIPPED | OUTPATIENT
Start: 2024-01-12

## 2024-01-12 NOTE — TELEPHONE ENCOUNTER
Meloxicam  DOS: 11/14/23  Last OV: 12/18/23  Last refill date: 12/18/23     #/refills: 30/0  Upcoming appt:   Future Appointments   Date Time Provider Department Center   1/15/2024  3:00 PM Wesloaneh, Kendall, PT [facility] [place]   1/18/2024  3:30 PM Weyrich, Kendall, PT [facility] [place]   1/22/2024  3:00 PM Weyrich, Kendall, PT [facility] [place]   1/25/2024  3:30 PM Weyrich, Kendall, PT [facility] [place]   1/29/2024  3:00 PM Weyrich, Kendall, PT [facility] [place]   2/1/2024  3:30 PM Weyrich, Kendall, PT [facility] [place]   2/5/2024  3:00 PM Weyrich, Kendall, PT [facility] [place]   2/7/2024  6:00 PM Weyrich, Kendall, PT [facility] [place]   2/12/2024  8:00 AM [id number]     11/1/23  BUN  10 - 20 mg/dL 19   CREATININE, BLOOD  0.60 - 1.00 mg/dL 0.84   BUN/CREATININE RATIO  12 - 20 ratio 23 High      GFR, EST. NONAFRICAN  >=60 >60

## 2024-01-15 ENCOUNTER — TELEPHONE (OUTPATIENT)
Dept: FAMILY MEDICINE CLINIC | Facility: CLINIC | Age: 64
End: 2024-01-15

## 2024-01-15 ENCOUNTER — APPOINTMENT (OUTPATIENT)
Dept: PHYSICAL THERAPY | Age: 64
End: 2024-01-15
Attending: ORTHOPAEDIC SURGERY
Payer: COMMERCIAL

## 2024-01-15 DIAGNOSIS — T84.010D FAILURE OF RIGHT TOTAL HIP ARTHROPLASTY, SUBSEQUENT ENCOUNTER: ICD-10-CM

## 2024-01-15 DIAGNOSIS — M25.551 RIGHT HIP PAIN: Primary | ICD-10-CM

## 2024-01-15 NOTE — TELEPHONE ENCOUNTER
Patient states she is a former [facility] patient and is now seeing Cristela LARSEN.    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    States today she was walking out to her barn and back and her hamstring was bothering her.  States he hip has frozen up and she is needing to use a crutch to get around  Took some cyclobenzaprine that she had from Dr Nina when this happened and it does help.    Patient asking if Cristela can give a referral for there to follow up with ortho regarding hip pain and muscle spasms.    Dr Nina is on [facility] list  Name:  ROSARIO NINA MD  Office:  [facility]  ORTHOPEDIC SURGERY  [address] [place] [id number]  Phone:  [phone]  Fax:  [phone]

## 2024-01-16 ENCOUNTER — TELEPHONE (OUTPATIENT)
Dept: FAMILY MEDICINE CLINIC | Facility: CLINIC | Age: 64
End: 2024-01-16

## 2024-01-16 NOTE — TELEPHONE ENCOUNTER
Patient notified and verbalized understanding.     Patient requests referral be faxed.  Referral faxed to Dr Ramsey at [phone]

## 2024-01-16 NOTE — TELEPHONE ENCOUNTER
Spoke with Chaya at [facility] who states Dr Ramsey is contracted with [facility] but their office is not.  If patient needs xrays there the referral will need to state that.    Discussed with Chaya if patient needs xrays they will need to be done at a contracted facility.  If they can fax us the orders we can do the xrays and send patient with images on disk.    Chaya to discuss with provider and call office back.    Patient appt is scheduled 1/19/24

## 2024-01-16 NOTE — TELEPHONE ENCOUNTER
Patient calling back. States Dr Ramsey's office told her they will be doing xrays at the appointment and the referral needs to specify ok to do xrays

## 2024-01-17 DIAGNOSIS — Z96.641 HISTORY OF HIP REPLACEMENT, TOTAL, RIGHT: Primary | ICD-10-CM

## 2024-01-17 DIAGNOSIS — Z96.642 HISTORY OF LEFT HIP REPLACEMENT: Primary | ICD-10-CM

## 2024-01-17 DIAGNOSIS — Z96.641 HISTORY OF TOTAL HIP ARTHROPLASTY, RIGHT: Primary | ICD-10-CM

## 2024-01-17 NOTE — TELEPHONE ENCOUNTER
Orders have been placed in Epic- Pt can call to schedule Xray prior to her visits so the provider can review    Phone rang 4 times, someone picked up but then it dropped.    Please call pt to give # to CS to schedule Xray    RN sent MCM as well

## 2024-01-17 NOTE — TELEPHONE ENCOUNTER
RN talked to Chaya again at Dr. Ramsey office- they will be placing the order in epic.  Pt should get Xray done prior to visit with Dr. Ramsey.    Rn to monitor to make sure the Xray gets ordered- will then give pt the number to central scheduling.

## 2024-01-18 ENCOUNTER — HOSPITAL ENCOUNTER (OUTPATIENT)
Dept: GENERAL RADIOLOGY | Age: 64
Discharge: HOME | End: 2024-01-18
Attending: PHYSICIAN ASSISTANT
Payer: COMMERCIAL

## 2024-01-18 ENCOUNTER — OFFICE VISIT (OUTPATIENT)
Dept: PHYSICAL THERAPY | Age: 64
End: 2024-01-18
Attending: ORTHOPAEDIC SURGERY
Payer: COMMERCIAL

## 2024-01-18 DIAGNOSIS — Z96.641 HISTORY OF TOTAL HIP ARTHROPLASTY, RIGHT: ICD-10-CM

## 2024-01-18 PROCEDURE — 73502 X-RAY EXAM HIP UNI 2-3 VIEWS: CPT | Performed by: PHYSICIAN ASSISTANT

## 2024-01-18 PROCEDURE — 97110 THERAPEUTIC EXERCISES: CPT

## 2024-01-18 NOTE — PROGRESS NOTES
Diagnosis:   L rotator cuff repair         Referring Provider: Yosef  Date of Evaluation:    11/15/23    Precautions:  Drug Allergy Next MD visit:   12/18/23  Date of Surgery: 11/14/23   Insurance Primary/Secondary[place] [employer] / N/A     # Auth Visits: HMO 8 expires 3/30/24          Subjective: Injured R hip the other day, decreased ADL activity as result of hip pain, had to use crutch to get around. Shoulder still crunches with ROM activity.    Pain: 2/10      Objective:     PROM: (1/18/2024)  Shoulder    Flexion: R 180; L 180  Abduction: R 180; L 170  ER: R 90; L 80  IR: R 55; L 45          Assessment: Continued crepitus in shoulder with AAROM activity. Good mobility despite crepitus and discomfort.       Goals:   (To be met in 30 visits)   Pt will report improved ability to sleep without waking due to shoulder pain  Pt will improve shoulder flexion AROM to >170 degrees to be able to reach into overhead cabinets without pain or restriction  Pt will improve shoulder abduction AROM to >170 degrees to improve ability to don deodorant, don/doff shirts, and wash hair  Pt will increase shoulder AROM ER to 80 to reach and fasten seatbelt   Pt will increase shoulder AROM IR to 60 to be able to reach in back pocket, tuck in shirt, and turn steering wheel without pain  Pt will improve shoulder strength throughout to 5/5 to improve function with ADLs including lifting and reaching tasks  Pt will demonstrate increased mid/low trap strength to 5/5 to promote improved shoulder mechanics and stabilization with ADL such as lifting and reaching   Pt will be independent and compliant with comprehensive HEP to maintain progress achieved in PT   Post QuickDASH Outcome Score  Post Score: 31.82 % (12/14/2023  2:54 PM)    52.27 % improvement    Plan: Progress per protocol    Certification From: 12/14/2023  To:3/13/2024    Date: 12/20/2023  Tx#: 12/12 Date: 12/27/2023  Tx#: 13/13 Date: 1/3/2024  Tx#: 1/8 Date: 1/8/2024  Tx#: 2/8 Date:  1/10/2024  Tx#: 3/8 Date: 1/18/2024  Tx#: 4/8   THERE EX:  PROM 19 mins within protocol  3 way deltoid submax isometrics 5\" 1x10 ea  Wand AAROM press 2x10  Wand AAROM supine flex 2x10  SL AROM ER A 2x10 THERE EX:  PROM 19 mins within protocol  3 way deltoid submax isometrics 5\" 1x10 ea  Wand AAROM press 2x10  Wand AAROM supine flex 2x10  SL AROM ER A 2x10  SL abd 1x10 THERE EX:  PROM 19 mins within protocol  3 way deltoid submax isometrics 5\" 1x10 ea  Wand AAROM press 2x10  Wand AAROM supine flex 2x10  SL AROM ER A 2x10  SL abd AA 1x10  Prone ext A 2x10 THERE EX:  PROM 19 mins within protocol  3 way deltoid submax isometrics 5\" 1x10 ea  Wand AAROM press 2x10  Wand AAROM supine flex 2x10  SL AROM ER A 2x10  SL abd AA 1x10  Prone ext 2x10 THERE EX:  PROM 19 mins within protocol  3 way deltoid submax isometrics 5\" 1x10 ea  Wand AAROM press 2x10  Wand AAROM supine flex 2x10  SL AROM ER A 2x10  SL abd AA 1x10  Prone ext 2x10 THERE EX:  PROM 19 mins within protocol  3 way deltoid submax isometrics 5\" 1x10 ea  Wand AAROM press 2x10  Wand AAROM supine flex 2x10  SL AROM ER A 2x10  SL abd AA 1x10  Prone ext 2x10   MANUAL THERAPY  STM L UT in sitting 10' MANUAL THERAPY  STM L UT in sitting 10' MANUAL THERAPY  STM L UT in sitting 10' MANUAL THERAPY  STM L UT in sitting 10' HELD                    HEP: Codman's pendulum , isometrics, wand AAROM     Charges: 3 ther ex     Total Timed Treatment: 40 min  Total Treatment Time: 40 min

## 2024-01-19 ENCOUNTER — TELEPHONE (OUTPATIENT)
Dept: FAMILY MEDICINE CLINIC | Facility: CLINIC | Age: 64
End: 2024-01-19

## 2024-01-19 NOTE — TELEPHONE ENCOUNTER
PATIENT IS CALLING THE [facility] LOCATION. SHE ASKS FOR HER PCP OR NURSE TO CALL HER BACK. SHE DIDN'T WANT TO EXPLAIN THE ENTIRE THING, BUT SAYS THE MRI IS OON FOR HER.

## 2024-01-22 ENCOUNTER — TELEPHONE (OUTPATIENT)
Dept: FAMILY MEDICINE CLINIC | Facility: CLINIC | Age: 64
End: 2024-01-22

## 2024-01-22 ENCOUNTER — OFFICE VISIT (OUTPATIENT)
Dept: PHYSICAL THERAPY | Age: 64
End: 2024-01-22
Attending: ORTHOPAEDIC SURGERY
Payer: COMMERCIAL

## 2024-01-22 PROCEDURE — 97110 THERAPEUTIC EXERCISES: CPT

## 2024-01-22 NOTE — PROGRESS NOTES
Diagnosis:   L rotator cuff repair         Referring Provider: Yosef  Date of Evaluation:    11/15/23    Precautions:  Drug Allergy Next MD visit:   12/18/23  Date of Surgery: 11/14/23   Ins[place]/Secondary[place] / N/A     # Auth Visits: HMO 8 expires 3/30/24          Subjective: Mild stiffness in shoulder, feeling pretty good overall though.     Pain: 2/10      Objective:     PROM: (1/18/2024)  Shoulder    Flexion: R 180; L 180  Abduction: R 180; L 170  ER: R 90; L 80  IR: R 55; L 45          Assessment: Added SL AROM flexion and AROM rowing to increase shoulder strength and stability.       Goals:   (To be met in 30 visits)   Pt will report improved ability to sleep without waking due to shoulder pain  Pt will improve shoulder flexion AROM to >170 degrees to be able to reach into overhead cabinets without pain or restriction  Pt will improve shoulder abduction AROM to >170 degrees to improve ability to don deodorant, don/doff shirts, and wash hair  Pt will increase shoulder AROM ER to 80 to reach and fasten seatbelt   Pt will increase shoulder AROM IR to 60 to be able to reach in back pocket, tuck in shirt, and turn steering wheel without pain  Pt will improve shoulder strength throughout to 5/5 to improve function with ADLs including lifting and reaching tasks  Pt will demonstrate increased mid/low trap strength to 5/5 to promote improved shoulder mechanics and stabilization with ADL such as lifting and reaching   Pt will be independent and compliant with comprehensive HEP to maintain progress achieved in PT   Post QuickDASH Outcome Score  Post Score: 31.82 % (12/14/2023  2:54 PM)    52.27 % improvement    Plan: Progress per protocol    Certification From: 12/14/2023  To:3/13/2024    Date: 12/27/2023  Tx#: 13/13 Date: 1/3/2024  Tx#: 1/8 Date: 1/8/2024  Tx#: 2/8 Date: 1/10/2024  Tx#: 3/8 Date: 1/18/2024  Tx#: 4/8 Date: 1/22/2024  Tx#: 5/8   THERE EX:  PROM 19 mins within protocol  3 way deltoid  submax isometrics 5\" 1x10 ea  Wand AAROM press 2x10  Wand AAROM supine flex 2x10  SL AROM ER A 2x10  SL abd 1x10 THERE EX:  PROM 19 mins within protocol  3 way deltoid submax isometrics 5\" 1x10 ea  Wand AAROM press 2x10  Wand AAROM supine flex 2x10  SL AROM ER A 2x10  SL abd AA 1x10  Prone ext A 2x10 THERE EX:  PROM 19 mins within protocol  3 way deltoid submax isometrics 5\" 1x10 ea  Wand AAROM press 2x10  Wand AAROM supine flex 2x10  SL AROM ER A 2x10  SL abd AA 1x10  Prone ext 2x10 THERE EX:  PROM 19 mins within protocol  3 way deltoid submax isometrics 5\" 1x10 ea  Wand AAROM press 2x10  Wand AAROM supine flex 2x10  SL AROM ER A 2x10  SL abd AA 1x10  Prone ext 2x10 THERE EX:  PROM 19 mins within protocol  3 way deltoid submax isometrics 5\" 1x10 ea  Wand AAROM press 2x10  Wand AAROM supine flex 2x10  SL AROM ER A 2x10  SL abd AA 1x10  Prone ext 2x10 THERAPEUTIC EX  UBE 3/3  PROM 15 mins   3 way deltoid submax isometrics 5\" 1x10 ea  Wand AAROM press 3x10  Wand AAROM supine flex 2x10  SL AROM ER A 3x10  SL abd AA 3x10  SL Flex A 3x10  Prone ext A 3x10  Prone row A 3x10     MANUAL THERAPY  STM L UT in sitting 10' MANUAL THERAPY  STM L UT in sitting 10' MANUAL THERAPY  STM L UT in sitting 10' HELD                     HEP: Codman's pendulum , isometrics, wand AAROM     Charges: 3 ther ex     Total Timed Treatment: 40 min  Total Treatment Time: 40 min

## 2024-01-22 NOTE — TELEPHONE ENCOUNTER
PATIENT CALLING BACK TO ADD A QUESTION. SHE IS ASKING IF SHE CAN SCHEDULED THIS MRI THROUGH [facility] IN [facility]  THE ORTHOPEDIC DOCTOR THAT PATIENT WAS REFERRED TO IS PART OF THAT GROUP

## 2024-01-22 NOTE — TELEPHONE ENCOUNTER
Is [facility] within [facility]?    I'm not certain that insurance will cover services or surgeries if not within [facility] network?      She may consider [facility] center?  Can we please reach out to referral department to see how we can best go about continuity of care

## 2024-01-22 NOTE — TELEPHONE ENCOUNTER
Pt states she went to hip specialist and he ordered an MRI. Pt is stating that she can not get the MRI approved by insurance unless her PCP orders it.  Can you please put in an order for an MRI?  Please let pt know when order it complete so she can schedule.  Thank you!

## 2024-01-22 NOTE — TELEPHONE ENCOUNTER
Advised patient of Cristela LARSEN's note below. Patient verbalized understanding.   Advised pt she may contact [facility] to inquire if pt able to complete MRI at their location; if not, pt to notify our office.    Pt requesting MCM with imaging center info  (MCM sent to pt)    Advised pt will also send to referrals dept for guidance. She v/u. No further questions at this time.    Routing to [facility] referrals - please advise if [facility]? Thank you

## 2024-01-23 ENCOUNTER — PATIENT MESSAGE (OUTPATIENT)
Dept: FAMILY MEDICINE CLINIC | Facility: CLINIC | Age: 64
End: 2024-01-23

## 2024-01-23 DIAGNOSIS — Z96.641 STATUS POST RIGHT HIP REPLACEMENT: Primary | ICD-10-CM

## 2024-01-23 NOTE — TELEPHONE ENCOUNTER
PATIENT CALLING THIS MORNING. SHE SAYS SHE HAS THE MRI ORDER. SHE WILL TRY AND SEND IT THROUGH [facility].

## 2024-01-23 NOTE — TELEPHONE ENCOUNTER
From: Pooja Lilly  To: Cristela Shaikh  Sent: 1/23/2024 10:06 AM CST  Subject: Right Hip MRI    Mayank Archibald, my insurance will cover mri if done at [facility]… could you send the mri referral on to [facility] & have you request the MRI , or do you have to send an order for my mri of r hip?

## 2024-01-23 NOTE — TELEPHONE ENCOUNTER
REferral order printed from [employer] and order placed    Pt was provided central scheduling phone number to call and schedule.    Covering provider- Dr. Varela signed the order.

## 2024-01-24 ENCOUNTER — MED REC SCAN ONLY (OUTPATIENT)
Dept: FAMILY MEDICINE CLINIC | Facility: CLINIC | Age: 64
End: 2024-01-24

## 2024-01-25 ENCOUNTER — OFFICE VISIT (OUTPATIENT)
Dept: PHYSICAL THERAPY | Age: 64
End: 2024-01-25
Attending: ORTHOPAEDIC SURGERY
Payer: COMMERCIAL

## 2024-01-25 PROCEDURE — 97110 THERAPEUTIC EXERCISES: CPT

## 2024-01-25 NOTE — PROGRESS NOTES
Diagnosis:   L rotator cuff repair         Referring Provider: Yosef  Date of Evaluation:    11/15/23    Precautions:  Drug Allergy Next MD visit:   12/18/23  Date of Surgery: 11/14/23   [place] / N/[place]     # Auth Visits: HMO 8 expires 3/30/24          Subjective: No new c/o, still get some crepitus in shoulder.      Pain: 2/10      Objective:     PROM: (1/18/2024)  Shoulder    Flexion: R 180; L 180  Abduction: R 180; L 170  ER: R 90; L 80  IR: R 55; L 45          Assessment: Altered SL abduction to avoid first 45 degrees of active motion to limit supraspinatus activity. Added serratus press in supine to increase scapular stability.       Goals:   (To be met in 30 visits)   Pt will report improved ability to sleep without waking due to shoulder pain  Pt will improve shoulder flexion AROM to >170 degrees to be able to reach into overhead cabinets without pain or restriction  Pt will improve shoulder abduction AROM to >170 degrees to improve ability to don deodorant, don/doff shirts, and wash hair  Pt will increase shoulder AROM ER to 80 to reach and fasten seatbelt   Pt will increase shoulder AROM IR to 60 to be able to reach in back pocket, tuck in shirt, and turn steering wheel without pain  Pt will improve shoulder strength throughout to 5/5 to improve function with ADLs including lifting and reaching tasks  Pt will demonstrate increased mid/low trap strength to 5/5 to promote improved shoulder mechanics and stabilization with ADL such as lifting and reaching   Pt will be independent and compliant with comprehensive HEP to maintain progress achieved in PT   Post QuickDASH Outcome Score  Post Score: 31.82 % (12/14/2023  2:54 PM)    52.27 % improvement    Plan: Progress per protocol    Certification From: 12/14/2023  To:3/13/2024    Date: 1/3/2024  Tx#: 1/8 Date: 1/8/2024  Tx#: 2/8 Date: 1/10/2024  Tx#: 3/8 Date: 1/18/2024  Tx#: 4/8 Date: 1/22/2024  Tx#: 5/8 Date: 1/25/2024  Tx#: 6/8    THERE EX:  PROM 19 mins within protocol  3 way deltoid submax isometrics 5\" 1x10 ea  Wand AAROM press 2x10  Wand AAROM supine flex 2x10  SL AROM ER A 2x10  SL abd AA 1x10  Prone ext A 2x10 THERE EX:  PROM 19 mins within protocol  3 way deltoid submax isometrics 5\" 1x10 ea  Wand AAROM press 2x10  Wand AAROM supine flex 2x10  SL AROM ER A 2x10  SL abd AA 1x10  Prone ext 2x10 THERE EX:  PROM 19 mins within protocol  3 way deltoid submax isometrics 5\" 1x10 ea  Wand AAROM press 2x10  Wand AAROM supine flex 2x10  SL AROM ER A 2x10  SL abd AA 1x10  Prone ext 2x10 THERE EX:  PROM 19 mins within protocol  3 way deltoid submax isometrics 5\" 1x10 ea  Wand AAROM press 2x10  Wand AAROM supine flex 2x10  SL AROM ER A 2x10  SL abd AA 1x10  Prone ext 2x10 THERAPEUTIC EX  UBE 3/3  PROM 15 mins   3 way deltoid submax isometrics 5\" 1x10 ea  Wand AAROM press 3x10  Wand AAROM supine flex 2x10  SL AROM ER A 3x10  SL abd AA 3x10  SL Flex A 3x10  Prone ext A 3x10  Prone row A 3x10   THERAPEUTIC EX  UBE 3/3  PROM 15 mins   3 way deltoid submax isometrics 5\" 1x10 ea  Wand AAROM press 3x10  Wand AAROM supine flex 2x10  SL abd AA 3x10  SL Flex A 3x10  Prone ext A 3x10  Prone row A 3x10  Serratus press A 3x10   MANUAL THERAPY  STM L UT in sitting 10' MANUAL THERAPY  STM L UT in sitting 10' HELD                      HEP: Jerry's pendulum , isometrics, wand AAROM     Charges: 3 ther ex     Total Timed Treatment: 40 min  Total Treatment Time: 40 min

## 2024-01-29 ENCOUNTER — OFFICE VISIT (OUTPATIENT)
Dept: PHYSICAL THERAPY | Age: 64
End: 2024-01-29
Attending: ORTHOPAEDIC SURGERY
Payer: COMMERCIAL

## 2024-01-29 PROCEDURE — 97110 THERAPEUTIC EXERCISES: CPT

## 2024-01-29 NOTE — PROGRESS NOTES
Diagnosis:   L rotator cuff repair         Referring Provider: Yosef  Date of Evaluation:    11/15/23    Precautions:  Drug Allergy Next MD visit:   12/18/23  Date of Surgery: 11/14/23   [place] / [place]     # Auth Visits: HMO 8 expires 3/30/24          Subjective: Shoulder seems to be moving better.    Pain: 2/10      Objective:     PROM: (1/18/2024)  Shoulder    Flexion: R 180; L 180  Abduction: R 180; L 170  ER: R 90; L 80  IR: R 55; L 45          Assessment: Full PROM, continued gains in AROM. Added standing shoulder flexion in mirror to avoid shoulder shrugging.       Goals:   (To be met in 30 visits)   Pt will report improved ability to sleep without waking due to shoulder pain  Pt will improve shoulder flexion AROM to >170 degrees to be able to reach into overhead cabinets without pain or restriction  Pt will improve shoulder abduction AROM to >170 degrees to improve ability to don deodorant, don/doff shirts, and wash hair  Pt will increase shoulder AROM ER to 80 to reach and fasten seatbelt   Pt will increase shoulder AROM IR to 60 to be able to reach in back pocket, tuck in shirt, and turn steering wheel without pain  Pt will improve shoulder strength throughout to 5/5 to improve function with ADLs including lifting and reaching tasks  Pt will demonstrate increased mid/low trap strength to 5/5 to promote improved shoulder mechanics and stabilization with ADL such as lifting and reaching   Pt will be independent and compliant with comprehensive HEP to maintain progress achieved in PT   Post QuickDASH Outcome Score  Post Score: 31.82 % (12/14/2023  2:54 PM)    52.27 % improvement    Plan: Progress per protocol    Certification From: 12/14/2023  To:3/13/2024    Date: 1/8/2024  Tx#: 2/8 Date: 1/10/2024  Tx#: 3/8 Date: 1/18/2024  Tx#: 4/8 Date: 1/22/2024  Tx#: 5/8 Date: 1/25/2024  Tx#: 6/8 Date: 1/29/2024  Tx#: 7/8   THERE EX:  PROM 19 mins within protocol  3 way deltoid submax  isometrics 5\" 1x10 ea  Wand AAROM press 2x10  Wand AAROM supine flex 2x10  SL AROM ER A 2x10  SL abd AA 1x10  Prone ext 2x10 THERE EX:  PROM 19 mins within protocol  3 way deltoid submax isometrics 5\" 1x10 ea  Wand AAROM press 2x10  Wand AAROM supine flex 2x10  SL AROM ER A 2x10  SL abd AA 1x10  Prone ext 2x10 THERE EX:  PROM 19 mins within protocol  3 way deltoid submax isometrics 5\" 1x10 ea  Wand AAROM press 2x10  Wand AAROM supine flex 2x10  SL AROM ER A 2x10  SL abd AA 1x10  Prone ext 2x10 THERAPEUTIC EX  UBE 3/3  PROM 15 mins   3 way deltoid submax isometrics 5\" 1x10 ea  Wand AAROM press 3x10  Wand AAROM supine flex 2x10  SL AROM ER A 3x10  SL abd AA 3x10  SL Flex A 3x10  Prone ext A 3x10  Prone row A 3x10   THERAPEUTIC EX  UBE 3/3  PROM 15 mins   3 way deltoid submax isometrics 5\" 1x10 ea  Wand AAROM press 3x10  Wand AAROM supine flex 2x10  SL abd AA 3x10  SL Flex A 3x10  Prone ext A 3x10  Prone row A 3x10  Serratus press A 3x10 THERAPEUTIC EX  UBE 3/3  PROM 15 mins   3 way deltoid submax isometrics 5\" 1x10 ea  Wand AAROM press 3x10  Wand AAROM supine flex 2x10  Serratus press A 3x10   SL abd AA 3x10  SL Flex A 3x10  SL abd AA 3x10  SL Flex A 3x10  Prone ext A 3x10  Prone row A 3x10   MANUAL THERAPY  STM L UT in sitting 10' HELD                       HEP: Codman's pendulum , isometrics, wand AAROM     Charges: 3 ther ex     Total Timed Treatment: 40 min  Total Treatment Time: 40 min

## 2024-02-01 ENCOUNTER — OFFICE VISIT (OUTPATIENT)
Dept: PHYSICAL THERAPY | Age: 64
End: 2024-02-01
Attending: ORTHOPAEDIC SURGERY
Payer: COMMERCIAL

## 2024-02-01 PROCEDURE — 97110 THERAPEUTIC EXERCISES: CPT

## 2024-02-01 NOTE — PROGRESS NOTES
Diagnosis:   L rotator cuff repair         Referring Provider: Yosef  Date of Evaluation:    11/15/23    Precautions:  Drug Allergy Next MD visit:   12/18/23  Date of Surgery: 11/14/23   [place] / [place]     # Auth Visits: HMO 8 expires 3/30/24          Subjective: Shoulder continues to make progress, did aquatic exercises this am.     Pain: 2/10      Objective:     PROM: (1/18/2024)  Shoulder    Flexion: R 180; L 180  Abduction: R 180; L 170  ER: R 90; L 80  IR: R 55; L 45          Assessment: Added light PREs for deltoids and RTC.       Goals:   (To be met in 30 visits)   Pt will report improved ability to sleep without waking due to shoulder pain  Pt will improve shoulder flexion AROM to >170 degrees to be able to reach into overhead cabinets without pain or restriction  Pt will improve shoulder abduction AROM to >170 degrees to improve ability to don deodorant, don/doff shirts, and wash hair  Pt will increase shoulder AROM ER to 80 to reach and fasten seatbelt   Pt will increase shoulder AROM IR to 60 to be able to reach in back pocket, tuck in shirt, and turn steering wheel without pain  Pt will improve shoulder strength throughout to 5/5 to improve function with ADLs including lifting and reaching tasks  Pt will demonstrate increased mid/low trap strength to 5/5 to promote improved shoulder mechanics and stabilization with ADL such as lifting and reaching   Pt will be independent and compliant with comprehensive HEP to maintain progress achieved in PT   Post QuickDASH Outcome Score  Post Score: 31.82 % (12/14/2023  2:54 PM)    52.27 % improvement    Plan: Progress per protocol    Certification From: 12/14/2023  To:3/13/2024    Date: 1/10/2024  Tx#: 3/8 Date: 1/18/2024  Tx#: 4/8 Date: 1/22/2024  Tx#: 5/8 Date: 1/25/2024  Tx#: 6/8 Date: 1/29/2024  Tx#: 7/12 Date: 2/1/2024  Tx#: 8/12   THERE EX:  PROM 19 mins within protocol  3 way deltoid submax isometrics 5\" 1x10 chad KAT  press 2x10  Wand AAROM supine flex 2x10  SL AROM ER A 2x10  SL abd AA 1x10  Prone ext 2x10 THERE EX:  PROM 19 mins within protocol  3 way deltoid submax isometrics 5\" 1x10 ea  Wand AAROM press 2x10  Wand AAROM supine flex 2x10  SL AROM ER A 2x10  SL abd AA 1x10  Prone ext 2x10 THERAPEUTIC EX  UBE 3/3  PROM 15 mins   3 way deltoid submax isometrics 5\" 1x10 ea  Wand AAROM press 3x10  Wand AAROM supine flex 2x10  SL AROM ER A 3x10  SL abd AA 3x10  SL Flex A 3x10  Prone ext A 3x10  Prone row A 3x10   THERAPEUTIC EX  UBE 3/3  PROM 15 mins   3 way deltoid submax isometrics 5\" 1x10 ea  Wand AAROM press 3x10  Wand AAROM supine flex 2x10  SL abd AA 3x10  SL Flex A 3x10  Prone ext A 3x10  Prone row A 3x10  Serratus press A 3x10 THERAPEUTIC EX  UBE 3/3  PROM 15 mins   3 way deltoid submax isometrics 5\" 1x10 ea  Wand AAROM press 3x10  Wand AAROM supine flex 2x10  Serratus press A 3x10   SL abd AA 3x10  SL Flex A 3x10  SL abd AA 3x10  SL Flex A 3x10  Prone ext A 3x10  Prone row A 3x10 THERAPEUTIC EX  UBE 3/3  PROM 15 mins   Wand AAROM press 3x10  Wand AAROM supine flex 2x10  Serratus press A 3x10   SL abd AA 3x10  SL Flex A 3x10  SL abd AA 3x10  SL ER 1# 3x10  SL Flex A 3x10  Prone ext 1# 3x10  Prone row 1# 3x10     HELD                        HEP: Codman's pendulum , isometrics, wand AAROM     Charges: 3 ther ex     Total Timed Treatment: 40 min  Total Treatment Time: 40 min

## 2024-02-05 ENCOUNTER — TELEPHONE (OUTPATIENT)
Dept: PHYSICAL THERAPY | Facility: HOSPITAL | Age: 64
End: 2024-02-05

## 2024-02-05 ENCOUNTER — OFFICE VISIT (OUTPATIENT)
Dept: PHYSICAL THERAPY | Age: 64
End: 2024-02-05
Attending: ORTHOPAEDIC SURGERY
Payer: COMMERCIAL

## 2024-02-05 PROCEDURE — 97110 THERAPEUTIC EXERCISES: CPT

## 2024-02-05 NOTE — PROGRESS NOTES
Diagnosis:   L rotator cuff repair         Referring Provider: Yosef  Date of Evaluation:    11/15/23    Precautions:  Drug Allergy Next MD visit:   12/18/23  Date of Surgery: 11/14/23   [place] / N[place]     # Auth Visits: HMO 8 expires 3/30/24          Subjective: Shoulder continues to progress well.     Pain: 2/10      Objective:     PROM: (1/18/2024)  Shoulder    Flexion: R 180; L 180  Abduction: R 180; L 170  ER: R 90; L 80  IR: R 55; L 45          Assessment: Added wall walks with SB to increase shoulder mobility.       Goals:   (To be met in 30 visits)   Pt will report improved ability to sleep without waking due to shoulder pain  Pt will improve shoulder flexion AROM to >170 degrees to be able to reach into overhead cabinets without pain or restriction  Pt will improve shoulder abduction AROM to >170 degrees to improve ability to don deodorant, don/doff shirts, and wash hair  Pt will increase shoulder AROM ER to 80 to reach and fasten seatbelt   Pt will increase shoulder AROM IR to 60 to be able to reach in back pocket, tuck in shirt, and turn steering wheel without pain  Pt will improve shoulder strength throughout to 5/5 to improve function with ADLs including lifting and reaching tasks  Pt will demonstrate increased mid/low trap strength to 5/5 to promote improved shoulder mechanics and stabilization with ADL such as lifting and reaching   Pt will be independent and compliant with comprehensive HEP to maintain progress achieved in PT   Post QuickDASH Outcome Score  Post Score: 31.82 % (12/14/2023  2:54 PM)    52.27 % improvement    Plan: Progress per protocol    Certification From: 12/14/2023  To:3/13/2024    Date: 1/18/2024  Tx#: 4/8 Date: 1/22/2024  Tx#: 5/8 Date: 1/25/2024  Tx#: 6/8 Date: 1/29/2024  Tx#: 7/12 Date: 2/1/2024  Tx#: 8/12 Date: 2/5/2024  Tx#: 9/12   THERE EX:  PROM 19 mins within protocol  3 way deltoid submax isometrics 5\" 1x10 ea  Jeremi AAROM press  2x10  Wand AAROM supine flex 2x10  SL AROM ER A 2x10  SL abd AA 1x10  Prone ext 2x10 THERAPEUTIC EX  UBE 3/3  PROM 15 mins   3 way deltoid submax isometrics 5\" 1x10 ea  Wand AAROM press 3x10  Wand AAROM supine flex 2x10  SL AROM ER A 3x10  SL abd AA 3x10  SL Flex A 3x10  Prone ext A 3x10  Prone row A 3x10   THERAPEUTIC EX  UBE 3/3  PROM 15 mins   3 way deltoid submax isometrics 5\" 1x10 ea  Wand AAROM press 3x10  Wand AAROM supine flex 2x10  SL abd AA 3x10  SL Flex A 3x10  Prone ext A 3x10  Prone row A 3x10  Serratus press A 3x10 THERAPEUTIC EX  UBE 3/3  PROM 15 mins   3 way deltoid submax isometrics 5\" 1x10 ea  Wand AAROM press 3x10  Wand AAROM supine flex 2x10  Serratus press A 3x10   SL abd AA 3x10  SL Flex A 3x10  SL abd AA 3x10  SL Flex A 3x10  Prone ext A 3x10  Prone row A 3x10 THERAPEUTIC EX  UBE 3/3  PROM 15 mins   Wand AAROM press 3x10  Wand AAROM supine flex 2x10  Serratus press A 3x10   SL abd AA 3x10  SL Flex A 3x10  SL abd AA 3x10  SL ER 1# 3x10  SL Flex A 3x10  Prone ext 1# 3x10  Prone row 1# 3x10   THERAPEUTIC EX  UBE 3/3  PROM 15 mins   Serratus press 1# 3x10   SL abd AA 3x10  SL Flex A 3x10  SL abd AA 3x10  SL ER 1# 3x10  SL Flex A 3x10  Prone ext 1# 3x10  Prone row 1# 3x10  SB wall walks 2x10                           HEP: Codman's pendulum , isometrics, wand AAROM     Charges: 3 ther ex     Total Timed Treatment: 40 min  Total Treatment Time: 40 min

## 2024-02-07 ENCOUNTER — OFFICE VISIT (OUTPATIENT)
Dept: PHYSICAL THERAPY | Age: 64
End: 2024-02-07
Attending: ORTHOPAEDIC SURGERY
Payer: COMMERCIAL

## 2024-02-07 PROCEDURE — 97110 THERAPEUTIC EXERCISES: CPT

## 2024-02-07 NOTE — PROGRESS NOTES
Progress Summary  Pt has attended 23 visits in Physical Therapy.    Diagnosis:   L rotator cuff repair         Referring Provider: Yosef  Date of Evaluation:    11/15/23    Precautions:  Drug Allergy Next MD visit:   12/18/23  Date of Surgery: 11/14/23   Insurance Primary/Secondary[place] / N/A     # Auth Visits: HMO 8 expires 3/30/24          Subjective: Feel soreness in AC joint area with elevation. ROM and strength continue to improve, but not as fast as would like.     Pain: 2/10      Objective:   AROM: (2/7/2024)  Shoulder    Flexion: R 180; L 70  Abduction: R 180; L 50  ER: R 90; L 45  IR: R 55; L 25      PROM: (2/7/2024)  Shoulder    Flexion: R 180; L 180  Abduction: R 180; L 170  ER: R 90; L 85  IR: R 55; L 55          Assessment: Good PROM. AROM lagging due to weakness, but progressing as expected considering scope of repair.       Goals:   (To be met in 30 visits)   Pt will report improved ability to sleep without waking due to shoulder pain  Pt will improve shoulder flexion AROM to >170 degrees to be able to reach into overhead cabinets without pain or restriction  Pt will improve shoulder abduction AROM to >170 degrees to improve ability to don deodorant, don/doff shirts, and wash hair  Pt will increase shoulder AROM ER to 80 to reach and fasten seatbelt   Pt will increase shoulder AROM IR to 60 to be able to reach in back pocket, tuck in shirt, and turn steering wheel without pain  Pt will improve shoulder strength throughout to 5/5 to improve function with ADLs including lifting and reaching tasks  Pt will demonstrate increased mid/low trap strength to 5/5 to promote improved shoulder mechanics and stabilization with ADL such as lifting and reaching   Pt will be independent and compliant with comprehensive HEP to maintain progress achieved in PT   Post QuickDASH Outcome Score  Post Score: 15.91 % (2/7/2024  6:40 PM)    68.18 % improvement    Plan: Continue skilled Physical Therapy 2 x/week or a  total of 15 visits over a 90 day period. Treatment will include: manual therapy, therapeutic ex       Patient/Family/Caregiver was advised of these findings, precautions, and treatment options and has agreed to actively participate in planning and for this course of care.    Thank you for your referral. If you have any questions, please contact me at Dept: [phone].    Sincerely,  Electronically signed by therapist: Kendall Dunlap PT     Physician's certification required:  Yes  Please co-sign or sign and return this letter via fax as soon as possible to [phone].   I certify the need for these services furnished under this plan of treatment and while under my care.    X___________________________________________________ Date____________________    Certification From: 2/7/2024  To:5/7/2024    Date: 1/22/2024  Tx#: 5/8 Date: 1/25/2024  Tx#: 6/8 Date: 1/29/2024  Tx#: 7/12 Date: 2/1/2024  Tx#: 8/12 Date: 2/5/2024  Tx#: 9/12 Date: 2/7/2024  Tx#: 10/12   THERAPEUTIC EX  UBE 3/3  PROM 15 mins   3 way deltoid submax isometrics 5\" 1x10 ea  Wand AAROM press 3x10  Wand AAROM supine flex 2x10  SL AROM ER A 3x10  SL abd AA 3x10  SL Flex A 3x10  Prone ext A 3x10  Prone row A 3x10   THERAPEUTIC EX  UBE 3/3  PROM 15 mins   3 way deltoid submax isometrics 5\" 1x10 ea  Wand AAROM press 3x10  Wand AAROM supine flex 2x10  SL abd AA 3x10  SL Flex A 3x10  Prone ext A 3x10  Prone row A 3x10  Serratus press A 3x10 THERAPEUTIC EX  UBE 3/3  PROM 15 mins   3 way deltoid submax isometrics 5\" 1x10 ea  Wand AAROM press 3x10  Wand AAROM supine flex 2x10  Serratus press A 3x10   SL abd AA 3x10  SL Flex A 3x10  SL abd AA 3x10  SL Flex A 3x10  Prone ext A 3x10  Prone row A 3x10 THERAPEUTIC EX  UBE 3/3  PROM 15 mins   Wand AAROM press 3x10  Wand AAROM supine flex 2x10  Serratus press A 3x10   SL abd AA 3x10  SL Flex A 3x10  SL abd AA 3x10  SL ER 1# 3x10  SL Flex A 3x10  Prone ext 1# 3x10  Prone row 1# 3x10   THERAPEUTIC EX  UBE 3/3  PROM 15  mins   Serratus press 1# 3x10   SL abd AA 3x10  SL Flex A 3x10  SL abd AA 3x10  SL ER 1# 3x10  SL Flex A 3x10  Prone ext 1# 3x10  Prone row 1# 3x10  SB wall walks 2x10 THERAPEUTIC EX  UBE 3/3  PROM 15 mins   Serratus press 1# 3x10   SL abd AA 3x10  SL Flex A 3x10  SL abd AA 3x10  SL ER 1# 3x10  SL Flex A 3x10  Prone ext 1# 3x10  Prone row 1# 3x10  SB wall walks 2x10                           HEP: Codman's pendulum , isometrics, wand AAROM     Charges: 3 ther ex     Total Timed Treatment: 40 min  Total Treatment Time: 40 min

## 2024-02-12 ENCOUNTER — OFFICE VISIT (OUTPATIENT)
Dept: ORTHOPEDICS CLINIC | Facility: CLINIC | Age: 64
End: 2024-02-12
Payer: COMMERCIAL

## 2024-02-12 ENCOUNTER — OFFICE VISIT (OUTPATIENT)
Dept: PHYSICAL THERAPY | Age: 64
End: 2024-02-12
Attending: ORTHOPAEDIC SURGERY
Payer: COMMERCIAL

## 2024-02-12 DIAGNOSIS — Z98.890 S/P ARTHROSCOPY OF SHOULDER: Primary | ICD-10-CM

## 2024-02-12 PROCEDURE — 97110 THERAPEUTIC EXERCISES: CPT

## 2024-02-12 PROCEDURE — 99024 POSTOP FOLLOW-UP VISIT: CPT | Performed by: PHYSICIAN ASSISTANT

## 2024-02-12 RX ORDER — METHYLPREDNISOLONE 4 MG/1
TABLET ORAL
Qty: 1 EACH | Refills: 0 | Status: SHIPPED | OUTPATIENT
Start: 2024-02-12

## 2024-02-12 NOTE — PROGRESS NOTES
Progress Summary  Pt has attended 23 visits in Physical Therapy.    Diagnosis:   L rotator cuff repair         Referring Provider: Yosef  Date of Evaluation:    11/15/23    Precautions:  Drug Allergy Next MD visit:   12/18/23  Date of Surgery: 11/14/23   [place] Primary/Secondary[place] / N/[place]     # Auth Visits: HMO 12 expires 3/30/24          Subjective: MD reminded me that surgery was extensive and will take longer to recover. Ordered anti-inflammatories to reduce shoulder inflammation.     Pain: 2/10      Objective:   AROM: (2/7/2024)  Shoulder    Flexion: R 180; L 70  Abduction: R 180; L 50  ER: R 90; L 45  IR: R 55; L 25      PROM: (2/7/2024)  Shoulder    Flexion: R 180; L 180  Abduction: R 180; L 170  ER: R 90; L 85  IR: R 55; L 55          Assessment: Progressed PREs to AROM or 1#, tolerated progression well.       Goals:   (To be met in 30 visits)   Pt will report improved ability to sleep without waking due to shoulder pain  Pt will improve shoulder flexion AROM to >170 degrees to be able to reach into overhead cabinets without pain or restriction  Pt will improve shoulder abduction AROM to >170 degrees to improve ability to don deodorant, don/doff shirts, and wash hair  Pt will increase shoulder AROM ER to 80 to reach and fasten seatbelt   Pt will increase shoulder AROM IR to 60 to be able to reach in back pocket, tuck in shirt, and turn steering wheel without pain  Pt will improve shoulder strength throughout to 5/5 to improve function with ADLs including lifting and reaching tasks  Pt will demonstrate increased mid/low trap strength to 5/5 to promote improved shoulder mechanics and stabilization with ADL such as lifting and reaching   Pt will be independent and compliant with comprehensive HEP to maintain progress achieved in PT   Post QuickDASH Outcome Score  Post Score: 15.91 % (2/7/2024  6:40 PM)    68.18 % improvement    Plan: Progress per protocol    Certification From: 2/7/2024  To:5/7/2024     Date: 1/25/2024  Tx#: 6/8 Date: 1/29/2024  Tx#: 7/12 Date: 2/1/2024  Tx#: 8/12 Date: 2/5/2024  Tx#: 9/12 Date: 2/7/2024  Tx#: 10/12 Date: 2/12/2024  Tx#: 11/12   THERAPEUTIC EX  UBE 3/3  PROM 15 mins   3 way deltoid submax isometrics 5\" 1x10 ea  Wand AAROM press 3x10  Wand AAROM supine flex 2x10  SL abd AA 3x10  SL Flex A 3x10  Prone ext A 3x10  Prone row A 3x10  Serratus press A 3x10 THERAPEUTIC EX  UBE 3/3  PROM 15 mins   3 way deltoid submax isometrics 5\" 1x10 ea  Wand AAROM press 3x10  Wand AAROM supine flex 2x10  Serratus press A 3x10   SL abd AA 3x10  SL Flex A 3x10  SL abd AA 3x10  SL Flex A 3x10  Prone ext A 3x10  Prone row A 3x10 THERAPEUTIC EX  UBE 3/3  PROM 15 mins   Wand AAROM press 3x10  Wand AAROM supine flex 2x10  Serratus press A 3x10   SL abd AA 3x10  SL Flex A 3x10  SL abd AA 3x10  SL ER 1# 3x10  SL Flex A 3x10  Prone ext 1# 3x10  Prone row 1# 3x10   THERAPEUTIC EX  UBE 3/3  PROM 15 mins   Serratus press 1# 3x10   SL abd AA 3x10  SL Flex A 3x10  SL abd AA 3x10  SL ER 1# 3x10  SL Flex A 3x10  Prone ext 1# 3x10  Prone row 1# 3x10  SB wall walks 2x10 THERAPEUTIC EX  UBE 3/3  PROM 15 mins   Serratus press 1# 3x10   SL abd AA 3x10  SL Flex A 3x10  SL abd AA 3x10  SL ER 1# 3x10  SL Flex A 3x10  Prone ext 1# 3x10  Prone row 1# 3x10  SB wall walks 2x10 THERAPEUTIC EX  UBE 3/3  PROM 15 mins   Serratus press 1# 3x10   SL abd AA 3x10  SL Flex A 3x10  SL abd AA 3x10  SL ER 1# 3x10  SL Flex A 3x10  Prone ext 1# 3x10  Prone row 1# 3x10  SB wall walks 2x10                           HEP: Codman's pendulum , isometrics, wand AAROM     Charges: 3 ther ex     Total Timed Treatment: 40 min  Total Treatment Time: 40 min

## 2024-02-12 NOTE — PROGRESS NOTES
[facility]  [address] [place] [id number]  [phone]       Name: Pooja Lilly   MRN: [id number]  Date: 2/12/2024     REASON FOR VISIT: Third Post-Surgical Visit   Surgery: Left shoulder rotator cuff repair (3 tendon repair), biceps tenodesis, subacromial decompression, distal clavicle excision 11/14/2023.       INTERVAL HISTORY:  Pooja Lilly is a 63 year old female who returns after the aforementioned procedure.  The post-operative course has been unremarkable with pain well controlled and overall progress noted.     Physical therapy was started and is progressing well.  Working with [facility]. No acute issues.     ROS: ROS    PE:   There were no vitals filed for this visit.  Estimated body mass index is 36.03 kg/m² as calculated from the following:    Height as of 10/12/23: 5' 6\" (1.676 m).    Weight as of 11/14/23: 223 lb 3.2 oz (101.2 kg).    Physical Exam  Constitutional:       Appearance: Normal appearance.   HENT:      Head: Normocephalic and atraumatic.   Eyes:      Extraocular Movements: Extraocular movements intact.   Neck:      Musculoskeletal: Normal range of motion and neck supple.   Cardiovascular:      Pulses: Normal pulses.   Pulmonary:      Effort: Pulmonary effort is normal. No respiratory distress.   Abdominal:      General: There is no distension.   Skin:     General: Skin is warm.      Capillary Refill: Capillary refill takes less than 2 seconds.      Findings: No bruising.   Neurological:      General: No focal deficit present.      Mental Status: She is alert.   Psychiatric:         Mood and Affect: Mood normal.     Examination of the left shoulder demonstrates:     Physical examination the patient is alert and oriented x3, well-developed, well-nourished, no acute distress.     90 of active FE, assisted to 145. 45 of ER, and IR to L1. 4+ strength.     Incisional sites are clean dry intact without signs of active pathology.      The  contralateral shoulder is without limitation in range of motion or strength, no positive provocative maneuvers.       Radiographic Examination/Diagnostics:    I personally viewed, independently interpreted and radiology report was reviewed.      XR HIP W OR WO PELVIS 2 OR 3 VIEWS, RIGHT (CPT=73502)    Result Date: 1/18/2024  PROCEDURE:  XR HIP W OR WO PELVIS 2 OR 3 VIEWS, RIGHT ( CPT=73502)  TECHNIQUE:  Unilateral 2 to 3 views of the hip and pelvis if performed.  COMPARISON:  None.  INDICATIONS:  Z96.641 History of total hip arthroplasty, right  PATIENT STATED HISTORY: (As transcribed by Technologist)   Right hip pain    FINDINGS:  BONES:  Status post total bilateral hip arthroplasty.  There is lucency around the proximal aspect of the femoral stem of the right hip arthroplasty.  No evidence of acute fracture or dislocation. SOFT TISSUES:  Negative.  No visible soft tissue swelling. EFFUSION:  None visible. OTHER:  Negative.            CONCLUSION:  Lucency around the proximal aspect of the femoral stem of the right hip arthroplasty.  Correlate for hardware loosening.   LOCATION:  [facility]   Dictated by (CST): Vasu Hutchinson MD on 1/18/2024 at 3:49 PM     Finalized by (CST): Vasu Hutchinson MD on 1/18/2024 at 3:54 PM         IMPRESSION: Pooja Lilly is a 63 year old female who presents almost 3 months s/p Left shoulder rotator cuff repair (3 tendon repair), biceps tenodesis, subacromial decompression, distal clavicle excision 11/14/2023.       PLAN:   We had a lengthy discussion with the patient regarding the patient's findings consistent with the expected postoperative course. We recommend continuation of physical therapy with rehabilitation efforts focused on strengthening, range of motion, functional ability, and return to baseline activity. The patient can continue to progress per protocol.     We will prescribe a medrol dose pack. We discussed that her repair was extensive, and her progress to date. We also  discussed that she may entail closer observation and potential for surgical intervention in the future.  We may consider MRI in the future.     All questions were answered appropriately and the patient was in agreement with the treatment plan.       FOLLOW-UP:  Return to clinic in eight weeks. No imaging required at next visit.             Sincer SETH Davis [facility], PA-C Orthopedic Surgery / Sports Medicine Specialist  [facility]  [address] [place] [address].org  [email or web address]  t: [phone]  o: [phone]  f: [phone]    This note was dictated using Dragon software.  While it was briefly proofread prior to completion, some grammatical, spelling, and word choice errors due to dictation may still occur.

## 2024-02-14 ENCOUNTER — OFFICE VISIT (OUTPATIENT)
Dept: PHYSICAL THERAPY | Age: 64
End: 2024-02-14
Attending: ORTHOPAEDIC SURGERY
Payer: COMMERCIAL

## 2024-02-14 PROCEDURE — 97110 THERAPEUTIC EXERCISES: CPT

## 2024-02-14 NOTE — PROGRESS NOTES
Progress Summary  Pt has attended 23 visits in Physical Therapy.    Diagnosis:   L rotator cuff repair         Referring Provider: Yosef  Date of Evaluation:    11/15/23    Precautions:  Drug Allergy Next MD visit:   12/18/23  Date of Surgery: 11/14/23   [place] Primary/Secondary[place] / N/[place]     # Auth Visits: HMO 20 expires 3/30/24          Subjective: Progressing, just slower than I'd like.      Pain: 2/10      Objective:   AROM: (2/7/2024)  Shoulder    Flexion: R 180; L 70  Abduction: R 180; L 50  ER: R 90; L 45  IR: R 55; L 25      PROM: (2/7/2024)  Shoulder    Flexion: R 180; L 180  Abduction: R 180; L 170  ER: R 90; L 85  IR: R 55; L 55          Assessment: Tolerates all ROM activity well, expected weakness with PREs. Added tband row and extension to increase shoulder strength and stability, issued to HEP.       Goals:   (To be met in 30 visits)   Pt will report improved ability to sleep without waking due to shoulder pain  Pt will improve shoulder flexion AROM to >170 degrees to be able to reach into overhead cabinets without pain or restriction  Pt will improve shoulder abduction AROM to >170 degrees to improve ability to don deodorant, don/doff shirts, and wash hair  Pt will increase shoulder AROM ER to 80 to reach and fasten seatbelt   Pt will increase shoulder AROM IR to 60 to be able to reach in back pocket, tuck in shirt, and turn steering wheel without pain  Pt will improve shoulder strength throughout to 5/5 to improve function with ADLs including lifting and reaching tasks  Pt will demonstrate increased mid/low trap strength to 5/5 to promote improved shoulder mechanics and stabilization with ADL such as lifting and reaching   Pt will be independent and compliant with comprehensive HEP to maintain progress achieved in PT   Post QuickDASH Outcome Score  Post Score: 15.91 % (2/7/2024  6:40 PM)    68.18 % improvement    Plan: Progress per protocol    Certification From: 2/7/2024  To:5/7/2024     Date: 1/29/2024  Tx#: 7/12 Date: 2/1/2024  Tx#: 8/12 Date: 2/5/2024  Tx#: 9/12 Date: 2/7/2024  Tx#: 10/12 Date: 2/12/2024  Tx#: 11/12 Date: 2/14/2024  Tx#: 12/20   THERAPEUTIC EX  UBE 3/3  PROM 15 mins   3 way deltoid submax isometrics 5\" 1x10 ea  Wand AAROM press 3x10  Wand AAROM supine flex 2x10  Serratus press A 3x10   SL abd AA 3x10  SL Flex A 3x10  SL abd AA 3x10  SL Flex A 3x10  Prone ext A 3x10  Prone row A 3x10 THERAPEUTIC EX  UBE 3/3  PROM 15 mins   Wand AAROM press 3x10  Wand AAROM supine flex 2x10  Serratus press A 3x10   SL abd AA 3x10  SL Flex A 3x10  SL abd AA 3x10  SL ER 1# 3x10  SL Flex A 3x10  Prone ext 1# 3x10  Prone row 1# 3x10   THERAPEUTIC EX  UBE 3/3  PROM 15 mins   Serratus press 1# 3x10   SL abd AA 3x10  SL Flex A 3x10  SL abd AA 3x10  SL ER 1# 3x10  SL Flex A 3x10  Prone ext 1# 3x10  Prone row 1# 3x10  SB wall walks 2x10 THERAPEUTIC EX  UBE 3/3  PROM 15 mins   Serratus press 1# 3x10   SL abd AA 3x10  SL Flex A 3x10  SL abd AA 3x10  SL ER 1# 3x10  SL Flex A 3x10  Prone ext 1# 3x10  Prone row 1# 3x10  SB wall walks 2x10 THERAPEUTIC EX  UBE 3/3  PROM 15 mins   Serratus press 1# 3x10   SL abd AA 3x10  SL Flex A 3x10  SL abd AA 3x10  SL ER 1# 3x10  SL Flex A 3x10  Prone ext 1# 3x10  Prone row 1# 3x10  SB wall walks 2x10 THERAPEUTIC EX  UBE 3/3  PROM 15 mins   Serratus press 1# 3x10   SL abd AA 3x10  SL Flex 1# 3x10  SL abd A3x10  SL ER 1# 3x10  SL Flex 1# 3x10  Prone ext 1# 3x10  Prone row 1# 3x10  SB wall walks 2x10  Tband row 3x10 red  Tband ext 3x10 red                           HEP: Codman's pendulum , isometrics, wand AAROM     Charges: 3 ther ex     Total Timed Treatment: 40 min  Total Treatment Time: 40 min

## 2024-02-19 ENCOUNTER — OFFICE VISIT (OUTPATIENT)
Dept: PHYSICAL THERAPY | Age: 64
End: 2024-02-19
Attending: ORTHOPAEDIC SURGERY
Payer: COMMERCIAL

## 2024-02-19 PROCEDURE — 97110 THERAPEUTIC EXERCISES: CPT

## 2024-02-19 NOTE — PROGRESS NOTES
Progress Summary  Pt has attended 23 visits in Physical Therapy.    Diagnosis:   L rotator cuff repair         Referring Provider: Yosef  Date of Evaluation:    11/15/23    Precautions:  Drug Allergy Next MD visit:   12/18/23  Date of Surgery: 11/14/23   [place] / [place]     # Auth Visits: HMO 20 expires 3/30/24          Subjective: No new c/o.    Pain: 2/10      Objective:   AROM: (2/7/2024)  Shoulder    Flexion: R 180; L 70  Abduction: R 180; L 50  ER: R 90; L 45  IR: R 55; L 25      PROM: (2/7/2024)  Shoulder    Flexion: R 180; L 180  Abduction: R 180; L 170  ER: R 90; L 85  IR: R 55; L 55          Assessment: Continued weakness and difficulty with standing active elevation.       Goals:   (To be met in 30 visits)   Pt will report improved ability to sleep without waking due to shoulder pain  Pt will improve shoulder flexion AROM to >170 degrees to be able to reach into overhead cabinets without pain or restriction  Pt will improve shoulder abduction AROM to >170 degrees to improve ability to don deodorant, don/doff shirts, and wash hair  Pt will increase shoulder AROM ER to 80 to reach and fasten seatbelt   Pt will increase shoulder AROM IR to 60 to be able to reach in back pocket, tuck in shirt, and turn steering wheel without pain  Pt will improve shoulder strength throughout to 5/5 to improve function with ADLs including lifting and reaching tasks  Pt will demonstrate increased mid/low trap strength to 5/5 to promote improved shoulder mechanics and stabilization with ADL such as lifting and reaching   Pt will be independent and compliant with comprehensive HEP to maintain progress achieved in PT   Post QuickDASH Outcome Score  Post Score: 15.91 % (2/7/2024  6:40 PM)    68.18 % improvement    Plan: Progress per protocol    Certification From: 2/7/2024  To:5/7/2024    Date: 2/1/2024  Tx#: 8/12 Date: 2/5/2024  Tx#: 9/12 Date: 2/7/2024  Tx#: 10/12 Date: 2/12/2024  Tx#: 11/12 Date:  2/14/2024  Tx#: 12/20 Date: 2/19/2024  Tx#: 13/20   THERAPEUTIC EX  UBE 3/3  PROM 15 mins   Wand AAROM press 3x10  Wand AAROM supine flex 2x10  Serratus press A 3x10   SL abd AA 3x10  SL Flex A 3x10  SL abd AA 3x10  SL ER 1# 3x10  SL Flex A 3x10  Prone ext 1# 3x10  Prone row 1# 3x10   THERAPEUTIC EX  UBE 3/3  PROM 15 mins   Serratus press 1# 3x10   SL abd AA 3x10  SL Flex A 3x10  SL abd AA 3x10  SL ER 1# 3x10  SL Flex A 3x10  Prone ext 1# 3x10  Prone row 1# 3x10  SB wall walks 2x10 THERAPEUTIC EX  UBE 3/3  PROM 15 mins   Serratus press 1# 3x10   SL abd AA 3x10  SL Flex A 3x10  SL abd AA 3x10  SL ER 1# 3x10  SL Flex A 3x10  Prone ext 1# 3x10  Prone row 1# 3x10  SB wall walks 2x10 THERAPEUTIC EX  UBE 3/3  PROM 15 mins   Serratus press 1# 3x10   SL abd AA 3x10  SL Flex A 3x10  SL abd AA 3x10  SL ER 1# 3x10  SL Flex A 3x10  Prone ext 1# 3x10  Prone row 1# 3x10  SB wall walks 2x10 THERAPEUTIC EX  UBE 3/3  PROM 15 mins   Serratus press 1# 3x10   SL abd AA 3x10  SL Flex 1# 3x10  SL abd A3x10  SL ER 1# 3x10  SL Flex 1# 3x10  Prone ext 1# 3x10  Prone row 1# 3x10  SB wall walks 2x10  Tband row 3x10 red  Tband ext 3x10 red THERAPEUTIC EX  UBE 3/3  PROM 15 mins   Serratus press 2# 3x10   SL abd AA 3x10  SL Flex 1# 3x10  SL abd A3x10  SL ER 1# 3x10  SL Flex 1# 3x10  Prone ext 2# 3x10  Prone row 2# 3x10  SB wall walks 2x10  Tband row 3x10 red  Tband ext 3x10 red                           HEP: Codman's pendulum , isometrics, wand AAROM     Charges: 3 ther ex     Total Timed Treatment: 40 min  Total Treatment Time: 40 min

## 2024-02-21 ENCOUNTER — OFFICE VISIT (OUTPATIENT)
Dept: PHYSICAL THERAPY | Age: 64
End: 2024-02-21
Attending: ORTHOPAEDIC SURGERY
Payer: COMMERCIAL

## 2024-02-21 PROCEDURE — 97110 THERAPEUTIC EXERCISES: CPT

## 2024-02-21 NOTE — PROGRESS NOTES
Diagnosis:   L rotator cuff repair         Referring Provider: Yosef  Date of Evaluation:    11/15/23    Precautions:  Drug Allergy Next MD visit:   12/18/23  Date of Surgery: 11/14/23   [place] / [place]     # Auth Visits: HMO 20 expires 3/30/24          Subjective: Still get pain in anterior shoulder with resisted flexion motion.     Pain: 2/10      Objective:   AROM: (2/7/2024)  Shoulder    Flexion: R 180; L 70  Abduction: R 180; L 50  ER: R 90; L 45  IR: R 55; L 25      PROM: (2/7/2024)  Shoulder    Flexion: R 180; L 180  Abduction: R 180; L 170  ER: R 90; L 85  IR: R 55; L 55          Assessment: Continued weakness and difficulty with standing active elevation.       Goals:   (To be met in 30 visits)   Pt will report improved ability to sleep without waking due to shoulder pain  Pt will improve shoulder flexion AROM to >170 degrees to be able to reach into overhead cabinets without pain or restriction  Pt will improve shoulder abduction AROM to >170 degrees to improve ability to don deodorant, don/doff shirts, and wash hair  Pt will increase shoulder AROM ER to 80 to reach and fasten seatbelt   Pt will increase shoulder AROM IR to 60 to be able to reach in back pocket, tuck in shirt, and turn steering wheel without pain  Pt will improve shoulder strength throughout to 5/5 to improve function with ADLs including lifting and reaching tasks  Pt will demonstrate increased mid/low trap strength to 5/5 to promote improved shoulder mechanics and stabilization with ADL such as lifting and reaching   Pt will be independent and compliant with comprehensive HEP to maintain progress achieved in PT   Post QuickDASH Outcome Score  Post Score: 15.91 % (2/7/2024  6:40 PM)    68.18 % improvement    Plan: Progress per protocol    Certification From: 2/7/2024  To:5/7/2024    Date: 2/5/2024  Tx#: 9/12 Date: 2/7/2024  Tx#: 10/12 Date: 2/12/2024  Tx#: 11/12 Date: 2/14/2024  Tx#: 12/20 Date:  2/19/2024  Tx#: 13/20 Date: 2/21/2024  Tx#: 14/20   THERAPEUTIC EX  UBE 3/3  PROM 15 mins   Serratus press 1# 3x10   SL abd AA 3x10  SL Flex A 3x10  SL abd AA 3x10  SL ER 1# 3x10  SL Flex A 3x10  Prone ext 1# 3x10  Prone row 1# 3x10  SB wall walks 2x10 THERAPEUTIC EX  UBE 3/3  PROM 15 mins   Serratus press 1# 3x10   SL abd AA 3x10  SL Flex A 3x10  SL abd AA 3x10  SL ER 1# 3x10  SL Flex A 3x10  Prone ext 1# 3x10  Prone row 1# 3x10  SB wall walks 2x10 THERAPEUTIC EX  UBE 3/3  PROM 15 mins   Serratus press 1# 3x10   SL abd AA 3x10  SL Flex A 3x10  SL abd AA 3x10  SL ER 1# 3x10  SL Flex A 3x10  Prone ext 1# 3x10  Prone row 1# 3x10  SB wall walks 2x10 THERAPEUTIC EX  UBE 3/3  PROM 15 mins   Serratus press 1# 3x10   SL abd AA 3x10  SL Flex 1# 3x10  SL abd A3x10  SL ER 1# 3x10  SL Flex 1# 3x10  Prone ext 1# 3x10  Prone row 1# 3x10  SB wall walks 2x10  Tband row 3x10 red  Tband ext 3x10 red THERAPEUTIC EX  UBE 3/3  PROM 15 mins   Serratus press 2# 3x10   SL abd AA 3x10  SL Flex 1# 3x10  SL abd A3x10  SL ER 1# 3x10  SL Flex 1# 3x10  Prone ext 2# 3x10  Prone row 2# 3x10  SB wall walks 2x10  Tband row 3x10 red  Tband ext 3x10 red THERAPEUTIC EX  UBE 3/3  PROM 15 mins   Serratus press 2# 3x10   SL abd 1# 3x10  SL Flex 1# 3x10  SL abd 1# 3x10  SL ER 2# 3x10  SL Flex 1# 3x10  Prone ext 2# 3x10  Prone row 2# 3x10  SB wall walks 2x10  Tband row 3x10 red  Tband ext 3x10 red                           HEP: Codman's pendulum , isometrics, wand AAROM     Charges: 3 ther ex     Total Timed Treatment: 40 min  Total Treatment Time: 40 min

## 2024-02-26 ENCOUNTER — OFFICE VISIT (OUTPATIENT)
Dept: PHYSICAL THERAPY | Age: 64
End: 2024-02-26
Attending: ORTHOPAEDIC SURGERY
Payer: COMMERCIAL

## 2024-02-26 PROCEDURE — 97110 THERAPEUTIC EXERCISES: CPT

## 2024-02-26 NOTE — PROGRESS NOTES
Diagnosis:   L rotator cuff repair         Referring Provider: Yosef  Date of Evaluation:    11/15/23    Precautions:  Drug Allergy Next MD visit:   12/18/23  Date of Surgery: 11/14/23   [place] / N[place]     # Auth Visits: HMO 20 expires 3/30/24          Subjective: Getting easier to sleep on side.     Pain: 2/10      Objective:   AROM: (2/7/2024)  Shoulder    Flexion: R 180; L 70  Abduction: R 180; L 50  ER: R 90; L 45  IR: R 55; L 25      PROM: (2/7/2024)  Shoulder    Flexion: R 180; L 180  Abduction: R 180; L 170  ER: R 90; L 85  IR: R 55; L 55          Assessment: Progressed PREs to match improved strength.       Goals:   (To be met in 30 visits)   Pt will report improved ability to sleep without waking due to shoulder pain  Pt will improve shoulder flexion AROM to >170 degrees to be able to reach into overhead cabinets without pain or restriction  Pt will improve shoulder abduction AROM to >170 degrees to improve ability to don deodorant, don/doff shirts, and wash hair  Pt will increase shoulder AROM ER to 80 to reach and fasten seatbelt   Pt will increase shoulder AROM IR to 60 to be able to reach in back pocket, tuck in shirt, and turn steering wheel without pain  Pt will improve shoulder strength throughout to 5/5 to improve function with ADLs including lifting and reaching tasks  Pt will demonstrate increased mid/low trap strength to 5/5 to promote improved shoulder mechanics and stabilization with ADL such as lifting and reaching   Pt will be independent and compliant with comprehensive HEP to maintain progress achieved in PT   Post QuickDASH Outcome Score  Post Score: 15.91 % (2/7/2024  6:40 PM)    68.18 % improvement    Plan: Add tband ER    Certification From: 2/7/2024  To:5/7/2024    Date: 2/7/2024  Tx#: 10/12 Date: 2/12/2024  Tx#: 11/12 Date: 2/14/2024  Tx#: 12/20 Date: 2/19/2024  Tx#: 13/20 Date: 2/21/2024  Tx#: 14/20 Date: 2/26/2024  Tx#: 15/20   THERAPEUTIC EX  UBE  3/3  PROM 15 mins   Serratus press 1# 3x10   SL abd AA 3x10  SL Flex A 3x10  SL abd AA 3x10  SL ER 1# 3x10  SL Flex A 3x10  Prone ext 1# 3x10  Prone row 1# 3x10  SB wall walks 2x10 THERAPEUTIC EX  UBE 3/3  PROM 15 mins   Serratus press 1# 3x10   SL abd AA 3x10  SL Flex A 3x10  SL abd AA 3x10  SL ER 1# 3x10  SL Flex A 3x10  Prone ext 1# 3x10  Prone row 1# 3x10  SB wall walks 2x10 THERAPEUTIC EX  UBE 3/3  PROM 15 mins   Serratus press 1# 3x10   SL abd AA 3x10  SL Flex 1# 3x10  SL abd A3x10  SL ER 1# 3x10  SL Flex 1# 3x10  Prone ext 1# 3x10  Prone row 1# 3x10  SB wall walks 2x10  Tband row 3x10 red  Tband ext 3x10 red THERAPEUTIC EX  UBE 3/3  PROM 15 mins   Serratus press 2# 3x10   SL abd AA 3x10  SL Flex 1# 3x10  SL abd A3x10  SL ER 1# 3x10  SL Flex 1# 3x10  Prone ext 2# 3x10  Prone row 2# 3x10  SB wall walks 2x10  Tband row 3x10 red  Tband ext 3x10 red THERAPEUTIC EX  UBE 3/3  PROM 15 mins   Serratus press 2# 3x10   SL abd 1# 3x10  SL Flex 1# 3x10  SL abd 1# 3x10  SL ER 2# 3x10  SL Flex 1# 3x10  Prone ext 2# 3x10  Prone row 2# 3x10  SB wall walks 2x10  Tband row 3x10 red  Tband ext 3x10 red THERAPEUTIC EX  UBE 3/3  PROM 15 mins   Serratus press 2# 3x10   SL abd 2# 3x10  SL Flex 2# 3x10  SL abd 2# 3x10  SL ER 2# 3x10  SL Flex 2# 3x10  Prone ext 2# 3x10  Prone row 2# 3x10  SB wall walks 2x10  Tband row 3x10 red  Tband ext 3x10 red                           HEP: Codman's pendulum , isometrics, wand AAROM     Charges: 3 ther ex     Total Timed Treatment: 40 min  Total Treatment Time: 40 min

## 2024-02-27 ENCOUNTER — HOSPITAL ENCOUNTER (OUTPATIENT)
Dept: MRI IMAGING | Facility: HOSPITAL | Age: 64
Discharge: HOME | End: 2024-02-27
Attending: NURSE PRACTITIONER
Payer: COMMERCIAL

## 2024-02-27 DIAGNOSIS — Z96.641 STATUS POST RIGHT HIP REPLACEMENT: ICD-10-CM

## 2024-02-27 PROCEDURE — 73721 MRI JNT OF LWR EXTRE W/O DYE: CPT | Performed by: NURSE PRACTITIONER

## 2024-02-28 ENCOUNTER — OFFICE VISIT (OUTPATIENT)
Dept: PHYSICAL THERAPY | Age: 64
End: 2024-02-28
Attending: ORTHOPAEDIC SURGERY
Payer: COMMERCIAL

## 2024-02-28 PROCEDURE — 97112 NEUROMUSCULAR REEDUCATION: CPT

## 2024-02-28 PROCEDURE — 97110 THERAPEUTIC EXERCISES: CPT

## 2024-02-28 NOTE — PROGRESS NOTES
Diagnosis:   L rotator cuff repair         Referring Provider: Yosef  Date of Evaluation:    11/15/23    Precautions:  Drug Allergy Next MD visit:   12/18/23  Date of Surgery: 11/14/23   Insurance Primary/Secondary: [employer] HMO / N/A     # Auth Visits: HMO 20 expires 3/30/24          Subjective: Shoulder still feels weak.    Pain: 2/10      Objective:   AROM: (2/7/2024)  Shoulder    Flexion: R 180; L 70  Abduction: R 180; L 50  ER: R 90; L 45  IR: R 55; L 25      PROM: (2/7/2024)  Shoulder    Flexion: R 180; L 180  Abduction: R 180; L 170  ER: R 90; L 85  IR: R 55; L 55          Assessment: Good PROM, AROM remains limited due to weakness. Added standing flexion with tactile cuing to prevent shrugging.       Goals:   (To be met in 30 visits)   Pt will report improved ability to sleep without waking due to shoulder pain  Pt will improve shoulder flexion AROM to >170 degrees to be able to reach into overhead cabinets without pain or restriction  Pt will improve shoulder abduction AROM to >170 degrees to improve ability to don deodorant, don/doff shirts, and wash hair  Pt will increase shoulder AROM ER to 80 to reach and fasten seatbelt   Pt will increase shoulder AROM IR to 60 to be able to reach in back pocket, tuck in shirt, and turn steering wheel without pain  Pt will improve shoulder strength throughout to 5/5 to improve function with ADLs including lifting and reaching tasks  Pt will demonstrate increased mid/low trap strength to 5/5 to promote improved shoulder mechanics and stabilization with ADL such as lifting and reaching   Pt will be independent and compliant with comprehensive HEP to maintain progress achieved in PT   Post QuickDASH Outcome Score  Post Score: 15.91 % (2/7/2024  6:40 PM)    68.18 % improvement    Plan: Continue cuing to avoid shrug with elevation tasks    Certification From: 2/7/2024  To:5/7/2024    Date: 2/12/2024  Tx#: 11/12 Date: 2/14/2024  Tx#: 12/20 Date: 2/19/2024  Tx#: 13/20 Date:  2/21/2024  Tx#: 14/20 Date: 2/26/2024  Tx#: 15/20 Date: 2/28 /2024  Tx#: 16/20   THERAPEUTIC EX  UBE 3/3  PROM 15 mins   Serratus press 1# 3x10   SL abd AA 3x10  SL Flex A 3x10  SL abd AA 3x10  SL ER 1# 3x10  SL Flex A 3x10  Prone ext 1# 3x10  Prone row 1# 3x10  SB wall walks 2x10 THERAPEUTIC EX  UBE 3/3  PROM 15 mins   Serratus press 1# 3x10   SL abd AA 3x10  SL Flex 1# 3x10  SL abd A3x10  SL ER 1# 3x10  SL Flex 1# 3x10  Prone ext 1# 3x10  Prone row 1# 3x10  SB wall walks 2x10  Tband row 3x10 red  Tband ext 3x10 red THERAPEUTIC EX  UBE 3/3  PROM 15 mins   Serratus press 2# 3x10   SL abd AA 3x10  SL Flex 1# 3x10  SL abd A3x10  SL ER 1# 3x10  SL Flex 1# 3x10  Prone ext 2# 3x10  Prone row 2# 3x10  SB wall walks 2x10  Tband row 3x10 red  Tband ext 3x10 red THERAPEUTIC EX  UBE 3/3  PROM 15 mins   Serratus press 2# 3x10   SL abd 1# 3x10  SL Flex 1# 3x10  SL abd 1# 3x10  SL ER 2# 3x10  SL Flex 1# 3x10  Prone ext 2# 3x10  Prone row 2# 3x10  SB wall walks 2x10  Tband row 3x10 red  Tband ext 3x10 red THERAPEUTIC EX  UBE 3/3  PROM 15 mins   Serratus press 2# 3x10   SL abd 2# 3x10  SL Flex 2# 3x10  SL abd 2# 3x10  SL ER 2# 3x10  SL Flex 2# 3x10  Prone ext 2# 3x10  Prone row 2# 3x10  SB wall walks 2x10  Tband row 3x10 red  Tband ext 3x10 red THERAPEUTIC EX  UBE 3/3  PROM 15 mins   Serratus press 3# 3x10   SL abd 2# 3x10  SL Flex 2# 3x10  SL abd 2# 3x10  SL ER 2# 3x10  SL Flex 2# 3x10  Prone ext 2# 3x10  Prone row 2# 3x10  SB wall walks 2x10  Tband row 3x10 grn  Tband ext 3x10 grn  Tband ER 3x10 yellow          NEURO RE-ED  Standing shoulder flexion with visual and tactile feedback for scapular control 1# 3x10                   HEP: Jerry's pendulum , isometrics, alise KAT     Charges: 3 ther ex, neuro re-ed     Total Timed Treatment: 50 min  Total Treatment Time: 50 min

## 2024-02-29 ENCOUNTER — APPOINTMENT (OUTPATIENT)
Dept: PHYSICAL THERAPY | Age: 64
End: 2024-02-29
Attending: ORTHOPAEDIC SURGERY
Payer: COMMERCIAL

## 2024-03-01 ENCOUNTER — TELEPHONE (OUTPATIENT)
Dept: FAMILY MEDICINE CLINIC | Facility: CLINIC | Age: 64
End: 2024-03-01

## 2024-03-01 DIAGNOSIS — M70.71 ILIOPSOAS BURSITIS OF RIGHT HIP: Primary | ICD-10-CM

## 2024-03-01 DIAGNOSIS — M25.551 BILATERAL HIP PAIN: ICD-10-CM

## 2024-03-01 DIAGNOSIS — M25.552 BILATERAL HIP PAIN: ICD-10-CM

## 2024-03-01 DIAGNOSIS — T56.891A TOXIC EFFECT OF OTHER METALS, ACCIDENTAL (UNINTENTIONAL), INITIAL ENCOUNTER: ICD-10-CM

## 2024-03-01 NOTE — TELEPHONE ENCOUNTER
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  PLEASE CALL  THANK YOU      Advised patient of Cristela LARSEN's note below. Patient verbalized understanding.   Pt reports she sees Dr. Vaughn Ramsey of [facility]    Pt reports she received call today from ortho stating she will need aspiration  Pt was advised needs referral from PCP office due to HMO insurance.    Pt reports has lab work from ortho as well - pt reports all orders should have been faxed to our office    Advised pt will watch for fax - she v/u    Please advise, thank you

## 2024-03-01 NOTE — TELEPHONE ENCOUNTER
Left detailed message to voicemail (per verbal release form consent with confirmed identifying message) of Cristela LARSEN's note below. Patient was advised to call office back - need to discuss note below

## 2024-03-04 ENCOUNTER — TELEPHONE (OUTPATIENT)
Dept: FAMILY MEDICINE CLINIC | Facility: CLINIC | Age: 64
End: 2024-03-04

## 2024-03-04 PROBLEM — T56.891A TOXIC EFFECT OF OTHER METALS, ACCIDENTAL (UNINTENTIONAL), INITIAL ENCOUNTER: Status: ACTIVE | Noted: 2024-03-04

## 2024-03-04 NOTE — TELEPHONE ENCOUNTER
Smitha Gonzales Kelly Nurse  Caller: Unspecified (Today, 12:54 PM)  Pt called dalia if we received the lab orders from ortho- I informed pt that we did but unsure if pt needs to schedule labs here prior to procedure?    Please advise and call the pt back, thank you!      Advised patient of Cristela LARSEN's note below. Patient verbalized understanding.     Pt reports she was hoping to wait another year so she can get on medicare and will be able to see Dr. Yessy Linn then    Pt needs lab orders in order to complete at reference lab  Pt asking if still waiting from referral dept? Do labs need authorization?    Pt with [employer] HMO insurance - most likely will need orders from PCP office    Please advise, thank you

## 2024-03-04 NOTE — TELEPHONE ENCOUNTER
Looks like she was previously managed by Yessy Linn.   Last sleep study from 2019, should be done ever 5 years.  Would she like referral to new sleep med/pulmonologist?     Also, yes we received lab orders - awaiting information from referral department - please see TE from 03/01/2024 - in the mean time - she can take lab orders to our reference lab to get completed.    Me  3/4/24  8:39 AM  Note  Fax received from Vaughn Ramsey MD with signed orders to test for cobalt and chromium.  Reaching out to referral department - Deepa Marx for recommendations and clarification.      Deepa, is this accurate:  PCP needs to order labs requested/ordered by specialist due to insurance coverage?

## 2024-03-04 NOTE — TELEPHONE ENCOUNTER
Fax received from Vaughn Ramsey MD with signed orders to test for cobalt and chromium.  Reaching out to referral department - Deepa Marx for recommendations and clarification.     Deepa, is this accurate:  PCP needs to order labs requested/ordered by specialist due to insurance coverage?

## 2024-03-04 NOTE — TELEPHONE ENCOUNTER
Pt states that Dr. Vaughn Ramsey is sending orders for blood work. Pt states that we should have already received order for right hip aspiration.  Please confirm receipt.   Pt also has a Cpap machine and needs to have a letter stating she is using it correctly for insurance. Previous doctor sent over readings to prove being used correctly.    Please advise.  Thank you!

## 2024-03-05 ENCOUNTER — TELEPHONE (OUTPATIENT)
Dept: FAMILY MEDICINE CLINIC | Facility: CLINIC | Age: 64
End: 2024-03-05

## 2024-03-05 DIAGNOSIS — E78.2 MIXED HYPERLIPIDEMIA: Primary | ICD-10-CM

## 2024-03-05 NOTE — TELEPHONE ENCOUNTER
Patient requests refill    [facility]    ezetimibe 10 MG Oral Tab     Patient also not understanding why she can't get labs done.orders in?

## 2024-03-05 NOTE — TELEPHONE ENCOUNTER
Last OV 10/12/23  Last lab 11/1/23 lipid, cmp/alt 36 (care everywhere)  Last refilled 8/14/23  #90  0 refill    Please also advise on question regarding lab. No open orders in epic

## 2024-03-06 ENCOUNTER — APPOINTMENT (OUTPATIENT)
Dept: PHYSICAL THERAPY | Age: 64
End: 2024-03-06
Attending: ORTHOPAEDIC SURGERY
Payer: COMMERCIAL

## 2024-03-06 ENCOUNTER — TELEPHONE (OUTPATIENT)
Dept: FAMILY MEDICINE CLINIC | Facility: CLINIC | Age: 64
End: 2024-03-06

## 2024-03-06 DIAGNOSIS — G47.33 OBSTRUCTIVE SLEEP APNEA SYNDROME: Primary | ICD-10-CM

## 2024-03-06 RX ORDER — EZETIMIBE 10 MG/1
10 TABLET ORAL DAILY
Qty: 90 TABLET | Refills: 1 | Status: SHIPPED | OUTPATIENT
Start: 2024-03-06

## 2024-03-06 NOTE — TELEPHONE ENCOUNTER
1.) Rx sent    2.) Last sleep study from 2019, should be done ever 5 years. Would she like referral to new sleep med/pulmonologist?     3.) She can get labs that her specialist ordered by taking the signed orders to our reference lab, awaiting additional information from referral department - please see TE from today 3/6/24

## 2024-03-06 NOTE — TELEPHONE ENCOUNTER
Can we call Pooja to let her know I'm awaiting further information from our referrals department?    - - - - - -    I'm awaiting guidance from referral department regarding lab orders - she should be able to go to reference lab to get these done since they are signed orders.    We are awaiting what needs to happen with procedure order - sounds like this should come from the specialist but needs to be approved?     Awaiting further guidance from Ursula in referrals department      \"Mayank Archibald-The provider doesn't need anything from your office. If this is being done in their office the referral is all they need. This procedure is not on the auth list either. Do you have the number of the office she is being seen at or a name of somebody on the order I can speak with?\"      Provided referral department with Sakina's information 3/6/24 at 1126am

## 2024-03-06 NOTE — TELEPHONE ENCOUNTER
Per [facility] - Ursula De La Cruz this case is different.      Can we call the patient to let her know she needs to take lab orders to reference lab and also needs to call central scheduling to schedule procedure.        Per [facility]:  The procedure the pt needs is done by the radiology dept at [facility]. That is where the order should have been sent. I talked to Chaya at Dr. Ramsey's office and gave her the number to fax the order to.  The order was faxed to centralized scheduling and the patient should call there to schedule the procedure.  The authorization dept for the hospital will then do the auth required for the out pt procedure.     Copy of paperwork is available in my in basket on my desk.  Copy of paperwork also given to NHUNG Price

## 2024-03-06 NOTE — TELEPHONE ENCOUNTER
2.) Last sleep study from 2019, should be done ever 5 years. Would she like referral to new sleep med/pulmonologist?      Referral placed for patient to complete sleep study, last sleep study in 2019.  Please request that patient follows with sleep medicine for cpap orders and adjustments

## 2024-03-06 NOTE — TELEPHONE ENCOUNTER
Please see note below     Refill request:  LOV 10/12/23 w/ Cristela LARSEN  Last labs 11/01/23  Last refill on 08/14/23, for #90 tabs, with 0 refills  ezetimibe 10 MG Oral Tab   Please see Refill encounter 03/05/24    2.   Previous DME order for CPAP supplies 09/16/22 from Dr. Linn's office (no longer in-network with [facility])  Pt requesting we contact [facility] medical supply  Ok to place referral order for CPAP supplies?    3.   Pt asking about 2 labs she needs done prior to Ortho procedure?    Please advise, thank you

## 2024-03-06 NOTE — TELEPHONE ENCOUNTER
Advised patient of Cristela LARSEN's note below. Patient verbalized understanding.     Pt v/u    Pt agreeable to get referral for sleep study, pt unsure when last sleep study was done, states she saw Dr. Linn, but unfortunately is no longer in network. Ok to place DME order for CPAP supplies to send to [facility] medical supply? Pt agreeable to receive MCM with sleep study referral contact info    Pt reports specialist sent lab orders to PCP office. Pt does not have lab orders with her. Pt reports she needs lab orders to be placed by PCP [facility]. Pt following up on referral for Ortho procedure - aspiration? (Advised pt of TE 03/06/24)    Please advise, thank you

## 2024-03-06 NOTE — TELEPHONE ENCOUNTER
Advised patient of Cristela LARSEN's note below. Patient verbalized understanding.     MCM sent to pt  Notify me if not read by 03/08/24    Copy of orders from [facility] - placed in pt blue book for pt

## 2024-03-06 NOTE — TELEPHONE ENCOUNTER
Cholesterol Medication Protocol Passed03/06/2024 10:41 AM   Protocol Details ALT < 80    ALT resulted within past year    Lipid panel within past 12 months    In person appointment or virtual visit in the past 12 mos or appointment in next 3 mos

## 2024-03-06 NOTE — TELEPHONE ENCOUNTER
[id number] - [place], [place] - [address] AT [facility] & [address] [place] [address], [phone], [phone]   [address] [place] [address]   Phone: [phone] Fax: [phone]       PATIENT CALLING THIS MORNING WITH A FEW THINGS. FIRST PATIENT REQUESTING REFILL FOR EZETIMIBE 10MG every day. PATIENT HAS BEEN OUT. SHE ASKS IF SHE IS DUE FOR A LIPID PROFILE FOR THIS MEDICATION.    SECOND, PATIENT ASKING WE CONTACT [facility] AND AUTHORIZE PATIENT STILL TAKING CPAP AND THIS JUSTIFIES THAT PATIENT STILL NEEDS HER SUPPLIES.    PHONE:[phone]  FAX:[phone]  PHARMACY:[phone]    THIRD, PATIENT ASKING ABOUT REFERRAL FOR [facility]. PATIENT ADVISED IT IS AUTHORIZED BUT ASKING IF THERE ARE LABS (SHOULD BE 2 OF THEM) ON REFERRAL ALSO. PATIENT SAYS SHE WAS TOLD SHE HAD TO HAVE THESE 2 LABS DONE PRIOR TO PROCEDURE.     PATIENT APOLOGIZES AND THANKS US FOR ALL WE DO.

## 2024-03-07 ENCOUNTER — LAB ENCOUNTER (OUTPATIENT)
Dept: LAB | Age: 64
End: 2024-03-07
Attending: ORTHOPAEDIC SURGERY
Payer: COMMERCIAL

## 2024-03-07 ENCOUNTER — ORDER TRANSCRIPTION (OUTPATIENT)
Dept: ADMINISTRATIVE | Facility: HOSPITAL | Age: 64
End: 2024-03-07

## 2024-03-07 DIAGNOSIS — Z96.649 HIP JOINT REPLACEMENT STATUS: Primary | ICD-10-CM

## 2024-03-07 DIAGNOSIS — T56.2X1A: Primary | ICD-10-CM

## 2024-03-07 DIAGNOSIS — T56.891A TOXIC EFFECT OF OTHER METALS, ACCIDENTAL (UNINTENTIONAL), INITIAL ENCOUNTER: ICD-10-CM

## 2024-03-07 PROCEDURE — 36415 COLL VENOUS BLD VENIPUNCTURE: CPT

## 2024-03-07 PROCEDURE — 83015 HEAVY METAL QUAL ANY NUMBER: CPT

## 2024-03-07 PROCEDURE — 82495 ASSAY OF CHROMIUM: CPT

## 2024-03-08 ENCOUNTER — MED REC SCAN ONLY (OUTPATIENT)
Dept: FAMILY MEDICINE CLINIC | Facility: CLINIC | Age: 64
End: 2024-03-08

## 2024-03-11 ENCOUNTER — OFFICE VISIT (OUTPATIENT)
Dept: PHYSICAL THERAPY | Age: 64
End: 2024-03-11
Attending: ORTHOPAEDIC SURGERY
Payer: COMMERCIAL

## 2024-03-11 PROCEDURE — 97110 THERAPEUTIC EXERCISES: CPT

## 2024-03-11 PROCEDURE — 97112 NEUROMUSCULAR REEDUCATION: CPT

## 2024-03-11 NOTE — PROGRESS NOTES
Diagnosis:   L rotator cuff repair         Referring Provider: Yosef  Date of Evaluation:    11/15/23    Precautions:  Drug Allergy Next MD visit:   12/18/23  Date of Surgery: 11/14/23   Insurance Primary/Secondary: [employer] / N/A     # Auth Visits: HMO 20 expires 3/30/24          Subjective: Shoulder less painful, feel like I've made some progress in the past week with that.    Pain: 2/10      Objective:   AROM: (2/7/2024)  Shoulder    Flexion: R 180; L 70  Abduction: R 180; L 50  ER: R 90; L 45  IR: R 55; L 25      PROM: (2/7/2024)  Shoulder    Flexion: R 180; L 180  Abduction: R 180; L 170  ER: R 90; L 85  IR: R 55; L 55          Assessment: Progressed PRE's to increase shoulder strength to improve stability with lifting and reaching tasks. Added wall ball circles to increase scapular stability.       Goals:   (To be met in 30 visits)   Pt will report improved ability to sleep without waking due to shoulder pain  Pt will improve shoulder flexion AROM to >170 degrees to be able to reach into overhead cabinets without pain or restriction  Pt will improve shoulder abduction AROM to >170 degrees to improve ability to don deodorant, don/doff shirts, and wash hair  Pt will increase shoulder AROM ER to 80 to reach and fasten seatbelt   Pt will increase shoulder AROM IR to 60 to be able to reach in back pocket, tuck in shirt, and turn steering wheel without pain  Pt will improve shoulder strength throughout to 5/5 to improve function with ADLs including lifting and reaching tasks  Pt will demonstrate increased mid/low trap strength to 5/5 to promote improved shoulder mechanics and stabilization with ADL such as lifting and reaching   Pt will be independent and compliant with comprehensive HEP to maintain progress achieved in PT   Post QuickDASH Outcome Score  Post Score: 15.91 % (2/7/2024  6:40 PM)    68.18 % improvement    Plan: Continue cuing to avoid shrug with elevation tasks    Certification From: 2/7/2024   To:5/7/2024    Date: 2/14/2024  Tx#: 12/20 Date: 2/19/2024  Tx#: 13/20 Date: 2/21/2024  Tx#: 14/20 Date: 2/26/2024  Tx#: 15/20 Date: 2/28/2024  Tx#: 16/20 Date: 3/11/2024  Tx#: 17/20   THERAPEUTIC EX  UBE 3/3  PROM 15 mins   Serratus press 1# 3x10   SL abd AA 3x10  SL Flex 1# 3x10  SL abd A3x10  SL ER 1# 3x10  SL Flex 1# 3x10  Prone ext 1# 3x10  Prone row 1# 3x10  SB wall walks 2x10  Tband row 3x10 red  Tband ext 3x10 red THERAPEUTIC EX  UBE 3/3  PROM 15 mins   Serratus press 2# 3x10   SL abd AA 3x10  SL Flex 1# 3x10  SL abd A3x10  SL ER 1# 3x10  SL Flex 1# 3x10  Prone ext 2# 3x10  Prone row 2# 3x10  SB wall walks 2x10  Tband row 3x10 red  Tband ext 3x10 red THERAPEUTIC EX  UBE 3/3  PROM 15 mins   Serratus press 2# 3x10   SL abd 1# 3x10  SL Flex 1# 3x10  SL abd 1# 3x10  SL ER 2# 3x10  SL Flex 1# 3x10  Prone ext 2# 3x10  Prone row 2# 3x10  SB wall walks 2x10  Tband row 3x10 red  Tband ext 3x10 red THERAPEUTIC EX  UBE 3/3  PROM 15 mins   Serratus press 2# 3x10   SL abd 2# 3x10  SL Flex 2# 3x10  SL abd 2# 3x10  SL ER 2# 3x10  SL Flex 2# 3x10  Prone ext 2# 3x10  Prone row 2# 3x10  SB wall walks 2x10  Tband row 3x10 red  Tband ext 3x10 red THERAPEUTIC EX  UBE 3/3  PROM 15 mins   Serratus press 3# 3x10   SL abd 2# 3x10  SL Flex 2# 3x10  SL abd 2# 3x10  SL ER 2# 3x10  SL Flex 2# 3x10  Prone ext 2# 3x10  Prone row 2# 3x10  SB wall walks 2x10  Tband row 3x10 grn  Tband ext 3x10 grn  Tband ER 3x10 yellow   THERAPEUTIC EX  UBE 3/3  PROM 15 mins   Serratus press 3# 3x10   SL abd 2# 3x10  SL Flex 2# 3x10  SL abd 2# 3x10  SL ER 2# 3x10  SL Flex 2# 3x10  Prone ext 3# 3x10  Prone row 3# 3x10  SB wall walks 2x10  Tband row 3x10 grn  Tband ext 3x10 grn  Tband ER 3x10 yellow       NEURO RE-ED  Standing shoulder flexion with visual and tactile feedback for scapular control 1# 3x10 NEURO RE-ED  Standing shoulder flexion with visual and tactile feedback for scapular control 1# 3x10  Ball circles on wall 20x cw and 20x ccw                     HEP: Codman's pendulum , isometrics, wand AAROM     Charges: 3 ther ex, neuro re-ed     Total Timed Treatment: 50 min  Total Treatment Time: 50 min

## 2024-03-12 ENCOUNTER — HOSPITAL ENCOUNTER (OUTPATIENT)
Dept: GENERAL RADIOLOGY | Facility: HOSPITAL | Age: 64
Discharge: HOME | End: 2024-03-12
Attending: ORTHOPAEDIC SURGERY
Payer: COMMERCIAL

## 2024-03-12 DIAGNOSIS — Z96.649 HIP JOINT REPLACEMENT STATUS: ICD-10-CM

## 2024-03-12 LAB
BASOPHILS NFR SNV: 0 %
COBALT: 1.1 UG/L
CRYSTALS SNV QL MICRO: NEGATIVE
EOSINOPHIL NFR SNV: 2 %
GRANULOCYTES # SNV AUTO: 6 /MM3 (ref 0–200)
LYMPHOCYTES NFR SNV: 34 %
MONOS+MACROS NFR SNV: 6 %
NEUTROPHILS NFR SNV: 58 %
RBC # FLD AUTO: 7000 /MM3 (ref ?–1)
TOTAL CELLS COUNTED FLD: 100

## 2024-03-12 PROCEDURE — 87206 SMEAR FLUORESCENT/ACID STAI: CPT | Performed by: ORTHOPAEDIC SURGERY

## 2024-03-12 PROCEDURE — 89060 EXAM SYNOVIAL FLUID CRYSTALS: CPT | Performed by: ORTHOPAEDIC SURGERY

## 2024-03-12 PROCEDURE — 77002 NEEDLE LOCALIZATION BY XRAY: CPT | Performed by: ORTHOPAEDIC SURGERY

## 2024-03-12 PROCEDURE — 87102 FUNGUS ISOLATION CULTURE: CPT | Performed by: ORTHOPAEDIC SURGERY

## 2024-03-12 PROCEDURE — 89050 BODY FLUID CELL COUNT: CPT | Performed by: ORTHOPAEDIC SURGERY

## 2024-03-12 PROCEDURE — 20610 DRAIN/INJ JOINT/BURSA W/O US: CPT | Performed by: ORTHOPAEDIC SURGERY

## 2024-03-12 PROCEDURE — 87205 SMEAR GRAM STAIN: CPT | Performed by: ORTHOPAEDIC SURGERY

## 2024-03-12 PROCEDURE — 87070 CULTURE OTHR SPECIMN AEROBIC: CPT | Performed by: ORTHOPAEDIC SURGERY

## 2024-03-13 ENCOUNTER — TELEPHONE (OUTPATIENT)
Dept: FAMILY MEDICINE CLINIC | Facility: CLINIC | Age: 64
End: 2024-03-13

## 2024-03-13 ENCOUNTER — OFFICE VISIT (OUTPATIENT)
Dept: PHYSICAL THERAPY | Age: 64
End: 2024-03-13
Attending: ORTHOPAEDIC SURGERY
Payer: COMMERCIAL

## 2024-03-13 DIAGNOSIS — Z12.31 SCREENING MAMMOGRAM FOR BREAST CANCER: Primary | ICD-10-CM

## 2024-03-13 DIAGNOSIS — E04.1 THYROID NODULE: ICD-10-CM

## 2024-03-13 LAB — CHROMIUM: 13.8 UG/L

## 2024-03-13 PROCEDURE — 97140 MANUAL THERAPY 1/> REGIONS: CPT

## 2024-03-13 PROCEDURE — 97110 THERAPEUTIC EXERCISES: CPT

## 2024-03-13 NOTE — PROGRESS NOTES
Diagnosis:   L rotator cuff repair         Referring Provider: Yosef  Date of Evaluation:    11/15/23    Precautions:  Drug Allergy Next MD visit:   12/18/23  Date of Surgery: 11/14/23   Insurance Primary/Secondary: [employer] HMO / N/A     # Auth Visits: HMO 20 expires 3/30/24          Subjective: Fatigued, progressing, just slowly.     Pain: 2/10      Objective:   AROM: (2/7/2024)  Shoulder    Flexion: R 180; L 70  Abduction: R 180; L 50  ER: R 90; L 45  IR: R 55; L 25      PROM: (2/7/2024)  Shoulder    Flexion: R 180; L 180  Abduction: R 180; L 170  ER: R 90; L 85  IR: R 55; L 55          Assessment: Well challenged with all exercises, works to fatigue.       Goals:   (To be met in 30 visits)   Pt will report improved ability to sleep without waking due to shoulder pain  Pt will improve shoulder flexion AROM to >170 degrees to be able to reach into overhead cabinets without pain or restriction  Pt will improve shoulder abduction AROM to >170 degrees to improve ability to don deodorant, don/doff shirts, and wash hair  Pt will increase shoulder AROM ER to 80 to reach and fasten seatbelt   Pt will increase shoulder AROM IR to 60 to be able to reach in back pocket, tuck in shirt, and turn steering wheel without pain  Pt will improve shoulder strength throughout to 5/5 to improve function with ADLs including lifting and reaching tasks  Pt will demonstrate increased mid/low trap strength to 5/5 to promote improved shoulder mechanics and stabilization with ADL such as lifting and reaching   Pt will be independent and compliant with comprehensive HEP to maintain progress achieved in PT   Post QuickDASH Outcome Score  Post Score: 15.91 % (2/7/2024  6:40 PM)    68.18 % improvement    Plan: Add wall walks    Certification From: 2/7/2024  To:5/7/2024    Date: 2/19/2024  Tx#: 13/20 Date: 2/21/2024  Tx#: 14/20 Date: 2/26/2024  Tx#: 15/20 Date: 2/28/2024  Tx#: 16/20 Date: 3/11/2024  Tx#: 17/20 Date: 3/13/2024  Tx#: 18/20    THERAPEUTIC EX  UBE 3/3  PROM 15 mins   Serratus press 2# 3x10   SL abd AA 3x10  SL Flex 1# 3x10  SL abd A3x10  SL ER 1# 3x10  SL Flex 1# 3x10  Prone ext 2# 3x10  Prone row 2# 3x10  SB wall walks 2x10  Tband row 3x10 red  Tband ext 3x10 red THERAPEUTIC EX  UBE 3/3  PROM 15 mins   Serratus press 2# 3x10   SL abd 1# 3x10  SL Flex 1# 3x10  SL abd 1# 3x10  SL ER 2# 3x10  SL Flex 1# 3x10  Prone ext 2# 3x10  Prone row 2# 3x10  SB wall walks 2x10  Tband row 3x10 red  Tband ext 3x10 red THERAPEUTIC EX  UBE 3/3  PROM 15 mins   Serratus press 2# 3x10   SL abd 2# 3x10  SL Flex 2# 3x10  SL abd 2# 3x10  SL ER 2# 3x10  SL Flex 2# 3x10  Prone ext 2# 3x10  Prone row 2# 3x10  SB wall walks 2x10  Tband row 3x10 red  Tband ext 3x10 red THERAPEUTIC EX  UBE 3/3  PROM 15 mins   Serratus press 3# 3x10   SL abd 2# 3x10  SL Flex 2# 3x10  SL abd 2# 3x10  SL ER 2# 3x10  SL Flex 2# 3x10  Prone ext 2# 3x10  Prone row 2# 3x10  SB wall walks 2x10  Tband row 3x10 grn  Tband ext 3x10 grn  Tband ER 3x10 yellow   THERAPEUTIC EX  UBE 3/3  PROM 15 mins   Serratus press 3# 3x10   SL abd 2# 3x10  SL Flex 2# 3x10  SL abd 2# 3x10  SL ER 2# 3x10  SL Flex 2# 3x10  Prone ext 3# 3x10  Prone row 3# 3x10  SB wall walks 2x10  Tband row 3x10 grn  Tband ext 3x10 grn  Tband ER 3x10 yellow THERAPEUTIC EX  UBE 3/3  PROM 15 mins   Serratus press 3# 3x10   SL abd 2# 3x10  SL Flex 2# 3x10  SL abd 2# 3x10  SL ER 2# 3x10  SL Flex 2# 3x10  Prone ext 3# 3x10  Prone row 3# 3x10  SB wall walks 2x10  Tband row 3x10 grn  Tband ext 3x10 grn  Tband ER 3x10 red      NEURO RE-ED  Standing shoulder flexion with visual and tactile feedback for scapular control 1# 3x10 NEURO RE-ED  Standing shoulder flexion with visual and tactile feedback for scapular control 1# 3x10  Ball circles on wall 20x cw and 20x ccw  NEURO RE-ED  Standing shoulder flexion with visual and tactile feedback for scapular control 1# 3x10  Ball circles on wall 20x cw and 20x ccw                    HEP: Melba  pendulum , isometrics, wand AAROM     Charges: 3 ther ex, neuro re-ed     Total Timed Treatment: 50 min  Total Treatment Time: 50 min

## 2024-03-13 NOTE — TELEPHONE ENCOUNTER
PT CALLED AND ADV NEEDS MAMMOGRAM ORDER - LAST MAMMOGRAM 4/7/23    ** ALSO PT HAS SMALL NODULE ON THRYOID - LOOKING TO SEE IF SHE IS DUE FOR ANOTHER THYROID ULTRA SOUND **      PLEASE ADV    KATELYN YOU

## 2024-03-14 DIAGNOSIS — Z98.890 S/P ARTHROSCOPY OF SHOULDER: ICD-10-CM

## 2024-03-14 RX ORDER — MELOXICAM 15 MG/1
15 TABLET ORAL
Qty: 30 TABLET | Refills: 0 | Status: SHIPPED | OUTPATIENT
Start: 2024-03-14

## 2024-03-14 NOTE — TELEPHONE ENCOUNTER
Meloxicam    DOS: 11/14/24 Left shoulder rotator cuff repain  Last OV: 2/12/24  Last refill date: 1/13/24 #/refills: 30/0  Upcoming appt:   Future Appointments   Date Time Provider Department Center   4/12/2024  8:00 AM Kiara Davis PA [id number]     Component  Ref Range & Units 11/1/23  9:29 AM   SODIUM  136 - 145 mmol/L 139   POTASSIUM  3.5 - 5.1 mmol/L 4.3   CHLORIDE  98 - 107 mmol/L 106   CO2, VENOUS  22 - 30 mmol/L 25   ANION GAP  <18.0 mmol/L 8.0   GLUCOSE  70 - 99 mg/dL 102 High    BUN  10 - 20 mg/dL 19   CREATININE, BLOOD  0.60 - 1.00 mg/dL 0.84   BUN/CREATININE RATIO  12 - 20 ratio 23 High    TOTAL PROTEIN  6.3 - 8.2 g/dL 7.6   ALBUMIN  3.5 - 5.0 g/dL 4.6   A/G RATIO  1.0 - 2.2 1.5   CALCIUM  8.7 - 10.5 mg/dL 9.5   T BILI  0.2 - 1.2 mg/dL 0.7   SGOT (AST)  5 - 34 U/L 33   SGPT (ALT)  0 - 55 U/L 36   ALKALINE PHOSPHATASE  40 - 150 U/L 94   IS THE PATIENT REQUIRED TO BE FASTING? No   GFR, ESTIMATED  >=60 >60

## 2024-03-14 NOTE — TELEPHONE ENCOUNTER
Last mammogram completed 04/07/23  \"RECOMMENDATIONS:    ROUTINE MAMMOGRAM AND CLINICAL EVALUATION IN 12 MONTHS\"    Mammogram order placed per protocol    US Thyroid completed  04/10/23 - ordered by Dr. Linn     Reviewed TE 04/17/23 \"consider yearly US\"      Left detailed message to voicemail (per verbal release form consent with confirmed identifying message) of note above. Patient was advised to call office back with any questions/concerns.    MCM sent to pt  Notify me if not read by 03/21/24

## 2024-03-18 ENCOUNTER — OFFICE VISIT (OUTPATIENT)
Dept: PHYSICAL THERAPY | Age: 64
End: 2024-03-18
Attending: ORTHOPAEDIC SURGERY
Payer: COMMERCIAL

## 2024-03-18 PROCEDURE — 97110 THERAPEUTIC EXERCISES: CPT

## 2024-03-18 PROCEDURE — 97112 NEUROMUSCULAR REEDUCATION: CPT

## 2024-03-18 NOTE — PROGRESS NOTES
Diagnosis:   L rotator cuff repair         Referring Provider: Yosef  Date of Evaluation:    11/15/23    Precautions:  Drug Allergy Next MD visit:   12/18/23  Date of Surgery: 11/14/23   Insurance Primary/Secondary[place] / N/A     # Auth Visits: HMO 20 expires 3/30/24          Subjective: General soreness, but getting better. Water exercises at gym help.     Pain: 2/10      Objective:   AROM: (3/18/2024)  Shoulder    Flexion: R 180; L 90  Abduction: R 180; L 70  ER: R 90; L 45  IR: R 55; L 25      PROM: (3/18/2024)  Shoulder    Flexion: R 180; L 180  Abduction: R 180; L 170  ER: R 90; L 85  IR: R 55; L 55          Assessment: AROM remains limited due to RTC weakness.       Goals:   (To be met in 30 visits)   Pt will report improved ability to sleep without waking due to shoulder pain  Pt will improve shoulder flexion AROM to >170 degrees to be able to reach into overhead cabinets without pain or restriction  Pt will improve shoulder abduction AROM to >170 degrees to improve ability to don deodorant, don/doff shirts, and wash hair  Pt will increase shoulder AROM ER to 80 to reach and fasten seatbelt   Pt will increase shoulder AROM IR to 60 to be able to reach in back pocket, tuck in shirt, and turn steering wheel without pain  Pt will improve shoulder strength throughout to 5/5 to improve function with ADLs including lifting and reaching tasks  Pt will demonstrate increased mid/low trap strength to 5/5 to promote improved shoulder mechanics and stabilization with ADL such as lifting and reaching   Pt will be independent and compliant with comprehensive HEP to maintain progress achieved in PT   Post QuickDASH Outcome Score  Post Score: 15.91 % (2/7/2024  6:40 PM)    68.18 % improvement    Plan: Reassess    Certification From: 2/7/2024  To:5/7/2024    Date: 2/21/2024  Tx#: 14/20 Date: 2/26/2024  Tx#: 15/20 Date: 2/28/2024  Tx#: 16/20 Date: 3/11/2024  Tx#: 17/20 Date: 3/13/2024  Tx#: 18/20 Date:  3/18/2024  Tx#: 19/20   THERAPEUTIC EX  UBE 3/3  PROM 15 mins   Serratus press 2# 3x10   SL abd 1# 3x10  SL Flex 1# 3x10  SL abd 1# 3x10  SL ER 2# 3x10  SL Flex 1# 3x10  Prone ext 2# 3x10  Prone row 2# 3x10  SB wall walks 2x10  Tband row 3x10 red  Tband ext 3x10 red THERAPEUTIC EX  UBE 3/3  PROM 15 mins   Serratus press 2# 3x10   SL abd 2# 3x10  SL Flex 2# 3x10  SL abd 2# 3x10  SL ER 2# 3x10  SL Flex 2# 3x10  Prone ext 2# 3x10  Prone row 2# 3x10  SB wall walks 2x10  Tband row 3x10 red  Tband ext 3x10 red THERAPEUTIC EX  UBE 3/3  PROM 15 mins   Serratus press 3# 3x10   SL abd 2# 3x10  SL Flex 2# 3x10  SL abd 2# 3x10  SL ER 2# 3x10  SL Flex 2# 3x10  Prone ext 2# 3x10  Prone row 2# 3x10  SB wall walks 2x10  Tband row 3x10 grn  Tband ext 3x10 grn  Tband ER 3x10 yellow   THERAPEUTIC EX  UBE 3/3  PROM 15 mins   Serratus press 3# 3x10   SL abd 2# 3x10  SL Flex 2# 3x10  SL abd 2# 3x10  SL ER 2# 3x10  SL Flex 2# 3x10  Prone ext 3# 3x10  Prone row 3# 3x10  SB wall walks 2x10  Tband row 3x10 grn  Tband ext 3x10 grn  Tband ER 3x10 yellow THERAPEUTIC EX  UBE 3/3  PROM 15 mins   Serratus press 3# 3x10   SL abd 2# 3x10  SL Flex 2# 3x10  SL abd 2# 3x10  SL ER 2# 3x10  SL Flex 2# 3x10  Prone ext 3# 3x10  Prone row 3# 3x10  SB wall walks 2x10  Tband row 3x10 grn  Tband ext 3x10 grn  Tband ER 3x10 red THERAPEUTIC EX  UBE 3/3  PROM 15 mins   Serratus press 3# 3x10   SL abd 2# 3x10  SL Flex 2# 3x10  SL abd 2# 3x10  SL ER 2# 3x10  SL Flex 2# 3x10  Prone ext 3# 3x10  Prone row 3# 3x10  SB wall walks 2x10  Tband row 3x10 grn  Tband ext 3x10 grn  Tband ER 3x10 red     NEURO RE-ED  Standing shoulder flexion with visual and tactile feedback for scapular control 1# 3x10 NEURO RE-ED  Standing shoulder flexion with visual and tactile feedback for scapular control 1# 3x10  Ball circles on wall 20x cw and 20x ccw  NEURO RE-ED  Standing shoulder flexion with visual and tactile feedback for scapular control 1# 3x10  Ball circles on wall 20x cw and  20x ccw  NEURO RE-ED  Standing shoulder flexion with visual and tactile feedback for scapular control 1# 3x10  Ball circles on wall 20x cw and 20x ccw                    HEP: Jerry's pendulum , isometrics, alise KAT     Charges: 3 ther ex, neuro re-ed     Total Timed Treatment: 50 min  Total Treatment Time: 50 min

## 2024-03-20 ENCOUNTER — OFFICE VISIT (OUTPATIENT)
Dept: PHYSICAL THERAPY | Age: 64
End: 2024-03-20
Attending: ORTHOPAEDIC SURGERY
Payer: COMMERCIAL

## 2024-03-20 PROCEDURE — 97112 NEUROMUSCULAR REEDUCATION: CPT

## 2024-03-20 PROCEDURE — 97110 THERAPEUTIC EXERCISES: CPT

## 2024-03-20 NOTE — PROGRESS NOTES
Diagnosis:   L rotator cuff repair         Referring Provider: Yosef  Date of Evaluation:    11/15/23    Precautions:  Drug Allergy Next MD visit:   12/18/23  Date of Surgery: 11/14/23   Ins[place] Primary/Secondary[place] / N/[place]     # Auth Visits: HMO 20 expires 3/30/24          Subjective: Shoulder slowly getting stronger, still feel fatigued.    Pain: 2/10      Objective:   AROM: (3/18/2024)  Shoulder    Flexion: R 180; L 90  Abduction: R 180; L 70  ER: R 90; L 45  IR: R 55; L 25      PROM: (3/18/2024)  Shoulder    Flexion: R 180; L 180  Abduction: R 180; L 170  ER: R 90; L 85  IR: R 55; L 55          Assessment: AROM remains limited due to RTC weakness. Progressed weight on PREs to improve shoulder strength.       Goals:   (To be met in 30 visits)   Pt will report improved ability to sleep without waking due to shoulder pain  Pt will improve shoulder flexion AROM to >170 degrees to be able to reach into overhead cabinets without pain or restriction  Pt will improve shoulder abduction AROM to >170 degrees to improve ability to don deodorant, don/doff shirts, and wash hair  Pt will increase shoulder AROM ER to 80 to reach and fasten seatbelt   Pt will increase shoulder AROM IR to 60 to be able to reach in back pocket, tuck in shirt, and turn steering wheel without pain  Pt will improve shoulder strength throughout to 5/5 to improve function with ADLs including lifting and reaching tasks  Pt will demonstrate increased mid/low trap strength to 5/5 to promote improved shoulder mechanics and stabilization with ADL such as lifting and reaching   Pt will be independent and compliant with comprehensive HEP to maintain progress achieved in PT   Post QuickDASH Outcome Score  Post Score: 15.91 % (2/7/2024  6:40 PM)    68.18 % improvement    Plan: Add body blade    Certification From: 2/7/2024  To:5/7/2024    Date: 2/26/2024  Tx#: 15/20 Date: 2/28/2024  Tx#: 16/20 Date: 3/11/2024  Tx#: 17/20 Date: 3/13/2024  Tx#: 18/20  Date: 3/18/2024  Tx#: 19/20 Date: 3/20/2024  Tx#: 20/25   THERAPEUTIC EX  UBE 3/3  PROM 15 mins   Serratus press 2# 3x10   SL abd 2# 3x10  SL Flex 2# 3x10  SL abd 2# 3x10  SL ER 2# 3x10  SL Flex 2# 3x10  Prone ext 2# 3x10  Prone row 2# 3x10  SB wall walks 2x10  Tband row 3x10 red  Tband ext 3x10 red THERAPEUTIC EX  UBE 3/3  PROM 15 mins   Serratus press 3# 3x10   SL abd 2# 3x10  SL Flex 2# 3x10  SL abd 2# 3x10  SL ER 2# 3x10  SL Flex 2# 3x10  Prone ext 2# 3x10  Prone row 2# 3x10  SB wall walks 2x10  Tband row 3x10 grn  Tband ext 3x10 grn  Tband ER 3x10 yellow   THERAPEUTIC EX  UBE 3/3  PROM 15 mins   Serratus press 3# 3x10   SL abd 2# 3x10  SL Flex 2# 3x10  SL abd 2# 3x10  SL ER 2# 3x10  SL Flex 2# 3x10  Prone ext 3# 3x10  Prone row 3# 3x10  SB wall walks 2x10  Tband row 3x10 grn  Tband ext 3x10 grn  Tband ER 3x10 yellow THERAPEUTIC EX  UBE 3/3  PROM 15 mins   Serratus press 3# 3x10   SL abd 2# 3x10  SL Flex 2# 3x10  SL abd 2# 3x10  SL ER 2# 3x10  SL Flex 2# 3x10  Prone ext 3# 3x10  Prone row 3# 3x10  SB wall walks 2x10  Tband row 3x10 grn  Tband ext 3x10 grn  Tband ER 3x10 red THERAPEUTIC EX  UBE 3/3  PROM 15 mins   Serratus press 3# 3x10   SL abd 2# 3x10  SL Flex 2# 3x10  SL abd 2# 3x10  SL ER 2# 3x10  SL Flex 2# 3x10  Prone ext 3# 3x10  Prone row 3# 3x10  SB wall walks 2x10  Tband row 3x10 grn  Tband ext 3x10 grn  Tband ER 3x10 red THERAPEUTIC EX  UBE 3/3  PROM 15 mins   Serratus press 3# 3x10   SL abd 3# 3x10  SL Flex 3# 3x10  SL abd 3# 3x10  SL ER 3# 3x10  SL Flex 3# 3x10  Prone ext 3# 3x10  Prone row 3# 3x10  SB wall walks 2x10  Tband row 3x10 grn  Tband ext 3x10 grn  Tband ER 3x10 red    NEURO RE-ED  Standing shoulder flexion with visual and tactile feedback for scapular control 1# 3x10 NEURO RE-ED  Standing shoulder flexion with visual and tactile feedback for scapular control 1# 3x10  Ball circles on wall 20x cw and 20x ccw  NEURO RE-ED  Standing shoulder flexion with visual and tactile feedback for  scapular control 1# 3x10  Ball circles on wall 20x cw and 20x ccw  NEURO RE-ED  Standing shoulder flexion with visual and tactile feedback for scapular control 1# 3x10  Ball circles on wall 20x cw and 20x ccw  NEURO RE-ED  Standing shoulder flexion with visual and tactile feedback for scapular control 1# 3x10  Ball circles on wall 20x cw and 20x ccw                    HEP: Codman's pendulum , isometrics, wand AAROM     Charges: 3 ther ex, neuro re-ed     Total Timed Treatment: 50 min  Total Treatment Time: 50 min

## 2024-03-25 ENCOUNTER — OFFICE VISIT (OUTPATIENT)
Dept: PHYSICAL THERAPY | Age: 64
End: 2024-03-25
Attending: ORTHOPAEDIC SURGERY
Payer: COMMERCIAL

## 2024-03-25 PROCEDURE — 97110 THERAPEUTIC EXERCISES: CPT

## 2024-03-25 NOTE — PROGRESS NOTES
Diagnosis:   L rotator cuff repair         Referring Provider: Yosef  Date of Evaluation:    11/15/23    Precautions:  Drug Allergy Next MD visit:   12/18/23  Date of Surgery: 11/14/23   Insurance Primary/Secondary[place] / N/A     # Auth Visits: O 25 expires 5/31/24          Subjective: Shoulder moving well, just weak.     Pain: 2/10      Objective:   AROM: (3/18/2024)  Shoulder    Flexion: R 180; L 90  Abduction: R 180; L 70  ER: R 90; L 45  IR: R 55; L 25      PROM: (3/18/2024)  Shoulder    Flexion: R 180; L 180  Abduction: R 180; L 170  ER: R 90; L 85  IR: R 55; L 55          Assessment: AROM remains limited due to RTC weakness. No significant pain, just weakness.       Goals:   (To be met in 30 visits)   Pt will report improved ability to sleep without waking due to shoulder pain  Pt will improve shoulder flexion AROM to >170 degrees to be able to reach into overhead cabinets without pain or restriction  Pt will improve shoulder abduction AROM to >170 degrees to improve ability to don deodorant, don/doff shirts, and wash hair  Pt will increase shoulder AROM ER to 80 to reach and fasten seatbelt   Pt will increase shoulder AROM IR to 60 to be able to reach in back pocket, tuck in shirt, and turn steering wheel without pain  Pt will improve shoulder strength throughout to 5/5 to improve function with ADLs including lifting and reaching tasks  Pt will demonstrate increased mid/low trap strength to 5/5 to promote improved shoulder mechanics and stabilization with ADL such as lifting and reaching   Pt will be independent and compliant with comprehensive HEP to maintain progress achieved in PT   Post QuickDASH Outcome Score  Post Score: 15.91 % (2/7/2024  6:40 PM)    68.18 % improvement    Plan: Add body blade    Certification From: 2/7/2024  To:5/7/2024    Date: 2/28/2024  Tx#: 16/20 Date: 3/11/2024  Tx#: 17/20 Date: 3/13/2024  Tx#: 18/20 Date: 3/18/2024  Tx#: 19/20 Date: 3/20/2024  Tx#: 20/25 Date:  3/25/2024  Tx#: 21/25   THERAPEUTIC EX  UBE 3/3  PROM 15 mins   Serratus press 3# 3x10   SL abd 2# 3x10  SL Flex 2# 3x10  SL abd 2# 3x10  SL ER 2# 3x10  SL Flex 2# 3x10  Prone ext 2# 3x10  Prone row 2# 3x10  SB wall walks 2x10  Tband row 3x10 grn  Tband ext 3x10 grn  Tband ER 3x10 yellow   THERAPEUTIC EX  UBE 3/3  PROM 15 mins   Serratus press 3# 3x10   SL abd 2# 3x10  SL Flex 2# 3x10  SL abd 2# 3x10  SL ER 2# 3x10  SL Flex 2# 3x10  Prone ext 3# 3x10  Prone row 3# 3x10  SB wall walks 2x10  Tband row 3x10 grn  Tband ext 3x10 grn  Tband ER 3x10 yellow THERAPEUTIC EX  UBE 3/3  PROM 15 mins   Serratus press 3# 3x10   SL abd 2# 3x10  SL Flex 2# 3x10  SL abd 2# 3x10  SL ER 2# 3x10  SL Flex 2# 3x10  Prone ext 3# 3x10  Prone row 3# 3x10  SB wall walks 2x10  Tband row 3x10 grn  Tband ext 3x10 grn  Tband ER 3x10 red THERAPEUTIC EX  UBE 3/3  PROM 15 mins   Serratus press 3# 3x10   SL abd 2# 3x10  SL Flex 2# 3x10  SL abd 2# 3x10  SL ER 2# 3x10  SL Flex 2# 3x10  Prone ext 3# 3x10  Prone row 3# 3x10  SB wall walks 2x10  Tband row 3x10 grn  Tband ext 3x10 grn  Tband ER 3x10 red THERAPEUTIC EX  UBE 3/3  PROM 15 mins   Serratus press 3# 3x10   SL abd 3# 3x10  SL Flex 3# 3x10  SL abd 3# 3x10  SL ER 3# 3x10  SL Flex 3# 3x10  Prone ext 3# 3x10  Prone row 3# 3x10  SB wall walks 2x10  Tband row 3x10 grn  Tband ext 3x10 grn  Tband ER 3x10 red THERAPEUTIC EX  UBE 3/3  PROM 15 mins   Serratus press 3# 3x10   SL abd 3# 3x10  SL Flex 3# 3x10  SL abd 3# 3x10  SL ER 3# 3x10  SL Flex 3# 3x10  Prone ext 3# 3x10  Prone row 3# 3x10  SB wall walks 2x10  Tband row 3x10 grn  Tband ext 3x10 grn  Tband ER 3x10 red  Wand IR stretch 2x10  Stand flex 1# 2x10   NEURO RE-ED  Standing shoulder flexion with visual and tactile feedback for scapular control 1# 3x10 NEURO RE-ED  Standing shoulder flexion with visual and tactile feedback for scapular control 1# 3x10  Ball circles on wall 20x cw and 20x ccw  NEURO RE-ED  Standing shoulder flexion with visual and  tactile feedback for scapular control 1# 3x10  Ball circles on wall 20x cw and 20x ccw  NEURO RE-ED  Standing shoulder flexion with visual and tactile feedback for scapular control 1# 3x10  Ball circles on wall 20x cw and 20x ccw  NEURO RE-ED  Standing shoulder flexion with visual and tactile feedback for scapular control 1# 3x10  Ball circles on wall 20x cw and 20x ccw  NEURO RE-ED    Ball circles on wall 20x cw and 20x ccw                    HEP: Codman's pendulum , isometrics, wand AAROM     Charges: 3 ther ex    Total Timed Treatment: 45 min  Total Treatment Time: 45 min

## 2024-03-27 ENCOUNTER — OFFICE VISIT (OUTPATIENT)
Dept: PHYSICAL THERAPY | Age: 64
End: 2024-03-27
Attending: ORTHOPAEDIC SURGERY
Payer: COMMERCIAL

## 2024-03-27 PROCEDURE — 97110 THERAPEUTIC EXERCISES: CPT

## 2024-03-27 PROCEDURE — 97112 NEUROMUSCULAR REEDUCATION: CPT

## 2024-03-27 NOTE — PROGRESS NOTES
Diagnosis:   L rotator cuff repair         Referring Provider: Yosef  Date of Evaluation:    11/15/23    Precautions:  Drug Allergy Next MD visit:   12/18/23  Date of Surgery: 11/14/23   Insurance Primary/Secondary[place] / N/A     # Auth Visits: HMO 25 expires 5/31/24          Subjective: Shoulder moving well, just weak.     Pain: 2/10      Objective:   AROM: (3/18/2024)  Shoulder    Flexion: R 180; L 90  Abduction: R 180; L 70  ER: R 90; L 45  IR: R 55; L 25      PROM: (3/18/2024)  Shoulder    Flexion: R 180; L 180  Abduction: R 180; L 170  ER: R 90; L 85  IR: R 55; L 55          Assessment: Added body blade to increase scapular strength and stability to aid active reach with L UE. Increased soreness in deltoid with body blade activity.       Goals:   (To be met in 30 visits)   Pt will report improved ability to sleep without waking due to shoulder pain  Pt will improve shoulder flexion AROM to >170 degrees to be able to reach into overhead cabinets without pain or restriction  Pt will improve shoulder abduction AROM to >170 degrees to improve ability to don deodorant, don/doff shirts, and wash hair  Pt will increase shoulder AROM ER to 80 to reach and fasten seatbelt   Pt will increase shoulder AROM IR to 60 to be able to reach in back pocket, tuck in shirt, and turn steering wheel without pain  Pt will improve shoulder strength throughout to 5/5 to improve function with ADLs including lifting and reaching tasks  Pt will demonstrate increased mid/low trap strength to 5/5 to promote improved shoulder mechanics and stabilization with ADL such as lifting and reaching   Pt will be independent and compliant with comprehensive HEP to maintain progress achieved in PT   Post QuickDASH Outcome Score  Post Score: 15.91 % (2/7/2024  6:40 PM)    68.18 % improvement    Plan: Progress scapular strength    Certification From: 2/7/2024  To:5/7/2024    Date: 3/13/2024  Tx#: 18/20 Date: 3/18/2024  Tx#: 19/20 Date:  3/20/2024  Tx#: 20/25 Date: 3/25/2024  Tx#: 21/25 Date: 3/27/2024  Tx#: 22/25   THERAPEUTIC EX  UBE 3/3  PROM 15 mins   Serratus press 3# 3x10   SL abd 2# 3x10  SL Flex 2# 3x10  SL abd 2# 3x10  SL ER 2# 3x10  SL Flex 2# 3x10  Prone ext 3# 3x10  Prone row 3# 3x10  SB wall walks 2x10  Tband row 3x10 grn  Tband ext 3x10 grn  Tband ER 3x10 red THERAPEUTIC EX  UBE 3/3  PROM 15 mins   Serratus press 3# 3x10   SL abd 2# 3x10  SL Flex 2# 3x10  SL abd 2# 3x10  SL ER 2# 3x10  SL Flex 2# 3x10  Prone ext 3# 3x10  Prone row 3# 3x10  SB wall walks 2x10  Tband row 3x10 grn  Tband ext 3x10 grn  Tband ER 3x10 red THERAPEUTIC EX  UBE 3/3  PROM 15 mins   Serratus press 3# 3x10   SL abd 3# 3x10  SL Flex 3# 3x10  SL abd 3# 3x10  SL ER 3# 3x10  SL Flex 3# 3x10  Prone ext 3# 3x10  Prone row 3# 3x10  SB wall walks 2x10  Tband row 3x10 grn  Tband ext 3x10 grn  Tband ER 3x10 red THERAPEUTIC EX  UBE 3/3  PROM 15 mins   Serratus press 3# 3x10   SL abd 3# 3x10  SL Flex 3# 3x10  SL abd 3# 3x10  SL ER 3# 3x10  SL Flex 3# 3x10  Prone ext 3# 3x10  Prone row 3# 3x10  SB wall walks 2x10  Tband row 3x10 grn  Tband ext 3x10 grn  Tband ER 3x10 red  Wand IR stretch 2x10  Stand flex 1# 2x10 THERAPEUTIC EX  UBE 3/3  PROM 15 mins   Serratus press 3# 3x10   SL abd 3# 3x10  SL Flex 3# 3x10  SL abd 3# 3x10  SL ER 3# 3x10  SL Flex 3# 3x10  Prone ext 3# 3x10  Prone row 3# 3x10  SB wall walks 2x10  Tband row 3x10 grn  Tband ext 3x10 grn  Tband ER 3x10 red  Wand IR stretch 2x10  Stand flex 1# 2x10   NEURO RE-ED  Standing shoulder flexion with visual and tactile feedback for scapular control 1# 3x10  Ball circles on wall 20x cw and 20x ccw  NEURO RE-ED  Standing shoulder flexion with visual and tactile feedback for scapular control 1# 3x10  Ball circles on wall 20x cw and 20x ccw  NEURO RE-ED  Standing shoulder flexion with visual and tactile feedback for scapular control 1# 3x10  Ball circles on wall 20x cw and 20x ccw  NEURO RE-ED    Ball circles on wall 20x cw  and 20x ccw  NEURO RE-ED    Ball circles on wall 20x cw and 20x ccw   Body blade 2 positions 2x30' ea                 HEP: Codman's pendulum , isometrics, alise KAT     Charges: 3 ther ex, neuro re-ed    Total Timed Treatment: 45 min  Total Treatment Time: 45 min

## 2024-03-28 ENCOUNTER — OFFICE VISIT (OUTPATIENT)
Dept: SLEEP CENTER | Age: 64
End: 2024-03-28
Attending: INTERNAL MEDICINE
Payer: COMMERCIAL

## 2024-03-28 DIAGNOSIS — G47.33 OBSTRUCTIVE SLEEP APNEA SYNDROME: ICD-10-CM

## 2024-03-28 PROCEDURE — 95806 SLEEP STUDY UNATT&RESP EFFT: CPT

## 2024-03-29 ENCOUNTER — TELEPHONE (OUTPATIENT)
Dept: FAMILY MEDICINE CLINIC | Facility: CLINIC | Age: 64
End: 2024-03-29

## 2024-03-29 NOTE — TELEPHONE ENCOUNTER
Received letter from Dr. Vaughn Ramsey stating, \"Pooja was seen in my office today for evaluation of her right lower extremity pain.  I advise she proceed with a MRI lumbar spine due to lumbar radiculopathy.  Direct any questions to my office, [phone].\"    Forwarding to referral department, specalist referral has been authorized     I was previously informed orders come further orders come from specialist since referral has been authorized    Confirming process - does written order from Dr. Vaughn Ramsey suffice or do I need to place new order?    Thank you,    Cristela

## 2024-04-01 ENCOUNTER — OFFICE VISIT (OUTPATIENT)
Dept: PHYSICAL THERAPY | Age: 64
End: 2024-04-01
Attending: ORTHOPAEDIC SURGERY
Payer: COMMERCIAL

## 2024-04-01 PROCEDURE — 97110 THERAPEUTIC EXERCISES: CPT

## 2024-04-01 PROCEDURE — 97112 NEUROMUSCULAR REEDUCATION: CPT

## 2024-04-01 NOTE — PROGRESS NOTES
Diagnosis:   L rotator cuff repair         Referring Provider: Yosef  Date of Evaluation:    11/15/23    Precautions:  Drug Allergy Next MD visit:   12/18/23  Date of Surgery: 11/14/23   Insurance Primary/Secondary: [place] / N/A     # Auth Visits: HMO 25 expires 5/31/24          Subjective: Occasional soreness, but much better overall. Continued weakness.     Pain: 2/10      Objective:   AROM: (3/18/2024)  Shoulder    Flexion: R 180; L 90  Abduction: R 180; L 70  ER: R 90; L 45  IR: R 55; L 25      PROM: (3/18/2024)  Shoulder    Flexion: R 180; L 180  Abduction: R 180; L 170  ER: R 90; L 85  IR: R 55; L 55          Assessment: AROM limited by weakness. Good PROM throughout.        Goals:   (To be met in 30 visits)   Pt will report improved ability to sleep without waking due to shoulder pain  Pt will improve shoulder flexion AROM to >170 degrees to be able to reach into overhead cabinets without pain or restriction  Pt will improve shoulder abduction AROM to >170 degrees to improve ability to don deodorant, don/doff shirts, and wash hair  Pt will increase shoulder AROM ER to 80 to reach and fasten seatbelt   Pt will increase shoulder AROM IR to 60 to be able to reach in back pocket, tuck in shirt, and turn steering wheel without pain  Pt will improve shoulder strength throughout to 5/5 to improve function with ADLs including lifting and reaching tasks  Pt will demonstrate increased mid/low trap strength to 5/5 to promote improved shoulder mechanics and stabilization with ADL such as lifting and reaching   Pt will be independent and compliant with comprehensive HEP to maintain progress achieved in PT   Post QuickDASH Outcome Score  Post Score: 15.91 % (2/7/2024  6:40 PM)    68.18 % improvement    Plan: Add lateral wall walking    Certification From: 2/7/2024  To:5/7/2024    Date: 3/13/2024  Tx#: 18/20 Date: 3/18/2024  Tx#: 19/20 Date: 3/20/2024  Tx#: 20/25 Date: 3/25/2024  Tx#: 21/25 Date: 3/27/2024  Tx#:  22/25 Date: 4/1/2024  Tx#: 23/25   THERAPEUTIC EX  UBE 3/3  PROM 15 mins   Serratus press 3# 3x10   SL abd 2# 3x10  SL Flex 2# 3x10  SL abd 2# 3x10  SL ER 2# 3x10  SL Flex 2# 3x10  Prone ext 3# 3x10  Prone row 3# 3x10  SB wall walks 2x10  Tband row 3x10 grn  Tband ext 3x10 grn  Tband ER 3x10 red THERAPEUTIC EX  UBE 3/3  PROM 15 mins   Serratus press 3# 3x10   SL abd 2# 3x10  SL Flex 2# 3x10  SL abd 2# 3x10  SL ER 2# 3x10  SL Flex 2# 3x10  Prone ext 3# 3x10  Prone row 3# 3x10  SB wall walks 2x10  Tband row 3x10 grn  Tband ext 3x10 grn  Tband ER 3x10 red THERAPEUTIC EX  UBE 3/3  PROM 15 mins   Serratus press 3# 3x10   SL abd 3# 3x10  SL Flex 3# 3x10  SL abd 3# 3x10  SL ER 3# 3x10  SL Flex 3# 3x10  Prone ext 3# 3x10  Prone row 3# 3x10  SB wall walks 2x10  Tband row 3x10 grn  Tband ext 3x10 grn  Tband ER 3x10 red THERAPEUTIC EX  UBE 3/3  PROM 15 mins   Serratus press 3# 3x10   SL abd 3# 3x10  SL Flex 3# 3x10  SL abd 3# 3x10  SL ER 3# 3x10  SL Flex 3# 3x10  Prone ext 3# 3x10  Prone row 3# 3x10  SB wall walks 2x10  Tband row 3x10 grn  Tband ext 3x10 grn  Tband ER 3x10 red  Wand IR stretch 2x10  Stand flex 1# 2x10 THERAPEUTIC EX  UBE 3/3  PROM 15 mins   Serratus press 3# 3x10   SL abd 3# 3x10  SL Flex 3# 3x10  SL abd 3# 3x10  SL ER 3# 3x10  SL Flex 3# 3x10  Prone ext 3# 3x10  Prone row 3# 3x10  SB wall walks 2x10  Tband row 3x10 grn  Tband ext 3x10 grn  Tband ER 3x10 red  Wand IR stretch 2x10  Stand flex 1# 2x10 THERAPEUTIC EX  UBE 3/3  PROM 15 mins   Serratus press 3# 3x10   SL abd 3# 3x10  SL Flex 3# 3x10  SL abd 3# 3x10  SL ER 3# 3x10  SL Flex 3# 3x10  Prone ext 3# 3x10  Prone row 3# 3x10  SB wall walks 2x10  Tband row 3x10 grn  Tband ext 3x10 grn  Tband ER 3x10 red  Wand IR stretch 2x10  Stand flex 1# 2x10     NEURO RE-ED  Standing shoulder flexion with visual and tactile feedback for scapular control 1# 3x10  Ball circles on wall 20x cw and 20x ccw  NEURO RE-ED  Standing shoulder flexion with visual and tactile  feedback for scapular control 1# 3x10  Ball circles on wall 20x cw and 20x ccw  NEURO RE-ED  Standing shoulder flexion with visual and tactile feedback for scapular control 1# 3x10  Ball circles on wall 20x cw and 20x ccw  NEURO RE-ED    Ball circles on wall 20x cw and 20x ccw  NEURO RE-ED    Ball circles on wall 20x cw and 20x ccw   Body blade 2 positions 2x30' ea NEURO RE-ED    Ball circles on wall 20x cw and 20x ccw   Body blade 2 positions 2x30' ea                   HEP: Codman's pendulum , isometrics, wand AAROM     Charges: 3 ther ex, neuro re-ed    Total Timed Treatment: 45 min  Total Treatment Time: 45 min

## 2024-04-02 ENCOUNTER — TELEPHONE (OUTPATIENT)
Dept: PHYSICAL THERAPY | Facility: HOSPITAL | Age: 64
End: 2024-04-02

## 2024-04-03 ENCOUNTER — TELEPHONE (OUTPATIENT)
Dept: FAMILY MEDICINE CLINIC | Facility: CLINIC | Age: 64
End: 2024-04-03

## 2024-04-03 ENCOUNTER — APPOINTMENT (OUTPATIENT)
Dept: PHYSICAL THERAPY | Age: 64
End: 2024-04-03
Attending: ORTHOPAEDIC SURGERY
Payer: COMMERCIAL

## 2024-04-03 DIAGNOSIS — M54.16 LUMBAR RADICULOPATHY: Primary | ICD-10-CM

## 2024-04-03 NOTE — TELEPHONE ENCOUNTER
PT CALLED AND ADV STILL WAITING ON A CALL BACK TO SEE IF WE PLACE MRI. PT ADV THAT SPECIALIST RECOMMENDS MRI AND NEEDS TO COME FROM OUR OFFICE - NEEDS AUTHORIZATION.    PLEASE ADV    THANK YOU    ** PLEASE LOOK AT TE 3/29/24 **

## 2024-04-03 NOTE — TELEPHONE ENCOUNTER
Fax received from [facility], Dr. Vaughn Ramsey is recommending MRI lumbar spine d/t lumbar radiculopathy

## 2024-04-03 NOTE — TELEPHONE ENCOUNTER
Spoke with patient, advised MRI placed. May call central scheduling to schedule at an [facility] facility.     Patient v/u

## 2024-04-04 ENCOUNTER — MED REC SCAN ONLY (OUTPATIENT)
Dept: FAMILY MEDICINE CLINIC | Facility: CLINIC | Age: 64
End: 2024-04-04

## 2024-04-07 DIAGNOSIS — Z98.890 S/P ARTHROSCOPY OF SHOULDER: ICD-10-CM

## 2024-04-08 ENCOUNTER — HOSPITAL ENCOUNTER (OUTPATIENT)
Dept: MAMMOGRAPHY | Age: 64
Discharge: HOME | End: 2024-04-08
Attending: NURSE PRACTITIONER
Payer: COMMERCIAL

## 2024-04-08 DIAGNOSIS — Z12.31 SCREENING MAMMOGRAM FOR BREAST CANCER: ICD-10-CM

## 2024-04-08 PROCEDURE — 77063 BREAST TOMOSYNTHESIS BI: CPT | Performed by: NURSE PRACTITIONER

## 2024-04-08 PROCEDURE — 77067 SCR MAMMO BI INCL CAD: CPT | Performed by: NURSE PRACTITIONER

## 2024-04-08 RX ORDER — MELOXICAM 15 MG/1
15 TABLET ORAL
Qty: 30 TABLET | Refills: 0 | Status: SHIPPED | OUTPATIENT
Start: 2024-04-08

## 2024-04-08 NOTE — TELEPHONE ENCOUNTER
Meloxicam    Last OV: 2/12/24  Last refill date: 3/14/24 #/refills: 30/0  Upcoming appt:   Future Appointments   Date Time Provider Department Center   4/12/2024  8:00 AM [id number]       Ref Range & Units 11/1/23  9:29 AM   SODIUM  136 - 145 mmol/L 139   POTASSIUM  3.5 - 5.1 mmol/L 4.3   CHLORIDE  98 - 107 mmol/L 106   CO2, VENOUS  22 - 30 mmol/L 25   ANION GAP  <18.0 mmol/L 8.0   GLUCOSE  70 - 99 mg/dL 102 High    BUN  10 - 20 mg/dL 19   CREATININE, BLOOD  0.60 - 1.00 mg/dL 0.84   BUN/CREATININE RATIO  12 - 20 ratio 23 High

## 2024-04-09 ENCOUNTER — TELEPHONE (OUTPATIENT)
Dept: PHYSICAL THERAPY | Age: 64
End: 2024-04-09

## 2024-04-10 ENCOUNTER — APPOINTMENT (OUTPATIENT)
Dept: PHYSICAL THERAPY | Age: 64
End: 2024-04-10
Attending: ORTHOPAEDIC SURGERY
Payer: COMMERCIAL

## 2024-04-10 ENCOUNTER — TELEPHONE (OUTPATIENT)
Dept: FAMILY MEDICINE CLINIC | Facility: CLINIC | Age: 64
End: 2024-04-10

## 2024-04-10 ENCOUNTER — OFFICE VISIT (OUTPATIENT)
Dept: PHYSICAL THERAPY | Age: 64
End: 2024-04-10
Attending: ORTHOPAEDIC SURGERY
Payer: COMMERCIAL

## 2024-04-10 PROCEDURE — 97110 THERAPEUTIC EXERCISES: CPT

## 2024-04-10 NOTE — TELEPHONE ENCOUNTER
Musa with [facility] states that he has sent over a request for Cpap prescription multiple times.  Can we please fax over a prescription to:    Fax:[phone]    Thank you!

## 2024-04-10 NOTE — TELEPHONE ENCOUNTER
Future Appointments   Date Time Provider Department Center   4/12/2024  8:00 AM Kiara Davis PA [id number]   4/12/2024 10:40 AM Cristela Shaikh APRN [facility] [place]   4/26/2024  7:15 PM YK SLEEP ROOMS [facility]   5/14/2024  1:00 PM WDR MRI RM1 (1.5T WIDE) WDR MRI EDW [facility]

## 2024-04-10 NOTE — PROGRESS NOTES
Discharge Summary  Pt has attended 24 visits in Physical Therapy.    Diagnosis:   L rotator cuff repair         Referring Provider: Yosef  Date of Evaluation:    11/15/23    Precautions:  Drug Allergy Next MD visit:   12/18/23  Date of Surgery: 11/14/23   Insurance Primary/Secondary: [place] / N/A     # Auth Visits: HMO 25 expires 5/31/24          Subjective: Resumed normal ADLs. Feel comfortable and confident in continuing on own with HEP at this time.     Pain: 1/10      Objective:   AROM: (4/10/2024)  Shoulder    Flexion: R 180; L 90  Abduction: R 180; L 70  ER: R 90; L 50  IR: R 55; L 30      PROM: (4/10/2024)  Shoulder    Flexion: R 180; L 180  Abduction: R 180; L 170  ER: R 90; L 85  IR: R 55; L 55      MMT: (4/10/2024)  Shoulder    Flexion: R 5/5; L 4-/5  Abduction: R 5/5; L 4-/5  ER: R 5/5; L 4-/5  IR: R 5/5; L 5/5       Assessment: AROM limited by weakness. Good PROM throughout.  Patient feels confident in regaining her strength on own with HEP at this time.       Goals:   (To be met in 30 visits)   Pt will report improved ability to sleep without waking due to shoulder pain-Met  Pt will improve shoulder flexion AROM to >170 degrees to be able to reach into overhead cabinets without pain or restriction  Pt will improve shoulder abduction AROM to >170 degrees to improve ability to don deodorant, don/doff shirts, and wash hair  Pt will increase shoulder AROM ER to 80 to reach and fasten seatbelt   Pt will increase shoulder AROM IR to 60 to be able to reach in back pocket, tuck in shirt, and turn steering wheel without pain  Pt will improve shoulder strength throughout to 5/5 to improve function with ADLs including lifting and reaching tasks  Pt will demonstrate increased mid/low trap strength to 5/5 to promote improved shoulder mechanics and stabilization with ADL such as lifting and reaching -Met  Pt will be independent and compliant with comprehensive HEP to maintain progress achieved in PT -Met  Post  QuickDASH Outcome Score  Post Score: 9.09 % (4/10/2024 10:42 AM)    75 % improvement    Plan: DC to HEP    Certification From: 2/7/2024  To:5/7/2024    Date: 3/18/2024  Tx#: 19/20 Date: 3/20/2024  Tx#: 20/25 Date: 3/25/2024  Tx#: 21/25 Date: 3/27/2024  Tx#: 22/25 Date: 4/1/2024  Tx#: 23/25 Date: 4/10/2024  Tx#: 24/25   THERAPEUTIC EX  UBE 3/3  PROM 15 mins   Serratus press 3# 3x10   SL abd 2# 3x10  SL Flex 2# 3x10  SL abd 2# 3x10  SL ER 2# 3x10  SL Flex 2# 3x10  Prone ext 3# 3x10  Prone row 3# 3x10  SB wall walks 2x10  Tband row 3x10 grn  Tband ext 3x10 grn  Tband ER 3x10 red THERAPEUTIC EX  UBE 3/3  PROM 15 mins   Serratus press 3# 3x10   SL abd 3# 3x10  SL Flex 3# 3x10  SL abd 3# 3x10  SL ER 3# 3x10  SL Flex 3# 3x10  Prone ext 3# 3x10  Prone row 3# 3x10  SB wall walks 2x10  Tband row 3x10 grn  Tband ext 3x10 grn  Tband ER 3x10 red THERAPEUTIC EX  UBE 3/3  PROM 15 mins   Serratus press 3# 3x10   SL abd 3# 3x10  SL Flex 3# 3x10  SL abd 3# 3x10  SL ER 3# 3x10  SL Flex 3# 3x10  Prone ext 3# 3x10  Prone row 3# 3x10  SB wall walks 2x10  Tband row 3x10 grn  Tband ext 3x10 grn  Tband ER 3x10 red  Wand IR stretch 2x10  Stand flex 1# 2x10 THERAPEUTIC EX  UBE 3/3  PROM 15 mins   Serratus press 3# 3x10   SL abd 3# 3x10  SL Flex 3# 3x10  SL abd 3# 3x10  SL ER 3# 3x10  SL Flex 3# 3x10  Prone ext 3# 3x10  Prone row 3# 3x10  SB wall walks 2x10  Tband row 3x10 grn  Tband ext 3x10 grn  Tband ER 3x10 red  Wand IR stretch 2x10  Stand flex 1# 2x10 THERAPEUTIC EX  UBE 3/3  PROM 15 mins   Serratus press 3# 3x10   SL abd 3# 3x10  SL Flex 3# 3x10  SL abd 3# 3x10  SL ER 3# 3x10  SL Flex 3# 3x10  Prone ext 3# 3x10  Prone row 3# 3x10  SB wall walks 2x10  Tband row 3x10 grn  Tband ext 3x10 grn  Tband ER 3x10 red  Wand IR stretch 2x10  Stand flex 1# 2x10   THERAPEUTIC EX  UBE 3/3  PROM 15 mins   Serratus press 3# 3x10   SL abd 3# 3x10  SL Flex 3# 3x10  SL abd 3# 3x10  SL ER 3# 3x10  SL Flex 3# 3x10  Prone ext 3# 3x10  Prone row 3# 3x10  SB  wall walks 2x10  Tband row 3x10 grn  Tband ext 3x10 grn  Tband ER 3x10 red  Wand IR stretch 2x10  Stand flex 2# 2x10   NEURO RE-ED  Standing shoulder flexion with visual and tactile feedback for scapular control 1# 3x10  Ball circles on wall 20x cw and 20x ccw  NEURO RE-ED  Standing shoulder flexion with visual and tactile feedback for scapular control 1# 3x10  Ball circles on wall 20x cw and 20x ccw  NEURO RE-ED    Ball circles on wall 20x cw and 20x ccw  NEURO RE-ED    Ball circles on wall 20x cw and 20x ccw   Body blade 2 positions 2x30' ea NEURO RE-ED    Ball circles on wall 20x cw and 20x ccw   Body blade 2 positions 2x30' ea                    HEP: Codman's pendulum , isometrics, wand AAROM     Charges: 3 ther ex   Total Timed Treatment: 45 min  Total Treatment Time: 45 min

## 2024-04-10 NOTE — TELEPHONE ENCOUNTER
Pooja is overdue for a sleep study.  She is scheduled for one on 04/26/2024    Please let [facility] know we are waiting on completion of updated sleep study and have deferred orders to sleep medicine      TE:  03/06/2024  2.) Last sleep study from 2019, should be done ever 5 years. Would she like referral to new sleep med/pulmonologist?    Referral placed for patient to complete sleep study, last sleep study in 2019. Please request that patient follows with sleep medicine for cpap orders and adjustments

## 2024-04-11 NOTE — TELEPHONE ENCOUNTER
Spoke with Alona at [facility], advised note below.    She will relate message to Musa

## 2024-04-12 ENCOUNTER — OFFICE VISIT (OUTPATIENT)
Dept: ORTHOPEDICS CLINIC | Facility: CLINIC | Age: 64
End: 2024-04-12
Payer: COMMERCIAL

## 2024-04-12 ENCOUNTER — TELEPHONE (OUTPATIENT)
Dept: FAMILY MEDICINE CLINIC | Facility: CLINIC | Age: 64
End: 2024-04-12

## 2024-04-12 ENCOUNTER — OFFICE VISIT (OUTPATIENT)
Dept: FAMILY MEDICINE CLINIC | Facility: CLINIC | Age: 64
End: 2024-04-12
Payer: COMMERCIAL

## 2024-04-12 VITALS
HEIGHT: 66 IN | SYSTOLIC BLOOD PRESSURE: 142 MMHG | BODY MASS INDEX: 36.32 KG/M2 | DIASTOLIC BLOOD PRESSURE: 88 MMHG | HEART RATE: 72 BPM | WEIGHT: 226 LBS | RESPIRATION RATE: 16 BRPM | TEMPERATURE: 99 F

## 2024-04-12 DIAGNOSIS — Z00.00 ANNUAL PHYSICAL EXAM: Primary | ICD-10-CM

## 2024-04-12 DIAGNOSIS — L21.9 SEBORRHEIC DERMATITIS: ICD-10-CM

## 2024-04-12 DIAGNOSIS — L21.9 SEBORRHEIC DERMATITIS: Primary | ICD-10-CM

## 2024-04-12 DIAGNOSIS — Z98.890 S/P ARTHROSCOPY OF SHOULDER: Primary | ICD-10-CM

## 2024-04-12 DIAGNOSIS — M62.838 MUSCLE SPASM: ICD-10-CM

## 2024-04-12 DIAGNOSIS — H93.13 TINNITUS OF BOTH EARS: ICD-10-CM

## 2024-04-12 PROBLEM — Z86.010 HISTORY OF ADENOMATOUS POLYP OF COLON: Status: RESOLVED | Noted: 2023-07-26 | Resolved: 2024-04-12

## 2024-04-12 PROBLEM — N81.6 RECTOCELE: Status: RESOLVED | Noted: 2022-12-02 | Resolved: 2024-04-12

## 2024-04-12 PROBLEM — S76.012A STRAIN OF FLEXOR MUSCLE OF LEFT HIP: Status: RESOLVED | Noted: 2022-03-16 | Resolved: 2024-04-12

## 2024-04-12 PROBLEM — Z86.0101 HISTORY OF ADENOMATOUS POLYP OF COLON: Status: RESOLVED | Noted: 2023-07-26 | Resolved: 2024-04-12

## 2024-04-12 PROBLEM — K62.3 RECTAL PROLAPSE: Status: RESOLVED | Noted: 2022-10-11 | Resolved: 2024-04-12

## 2024-04-12 LAB
ALBUMIN SERPL-MCNC: 4.1 G/DL (ref 3.4–5)
ALBUMIN/GLOB SERPL: 1.1 {RATIO} (ref 1–2)
ALP LIVER SERPL-CCNC: 99 U/L
ALT SERPL-CCNC: 49 U/L
ANION GAP SERPL CALC-SCNC: 5 MMOL/L (ref 0–18)
AST SERPL-CCNC: 38 U/L (ref 15–37)
BASOPHILS # BLD AUTO: 0.02 X10(3) UL (ref 0–0.2)
BASOPHILS NFR BLD AUTO: 0.3 %
BILIRUB SERPL-MCNC: 0.6 MG/DL (ref 0.1–2)
BUN BLD-MCNC: 17 MG/DL (ref 9–23)
CALCIUM BLD-MCNC: 10.6 MG/DL (ref 8.5–10.1)
CHLORIDE SERPL-SCNC: 107 MMOL/L (ref 98–112)
CHOLEST SERPL-MCNC: 172 MG/DL (ref ?–200)
CO2 SERPL-SCNC: 27 MMOL/L (ref 21–32)
CREAT BLD-MCNC: 0.88 MG/DL
EGFRCR SERPLBLD CKD-EPI 2021: 73 ML/MIN/1.73M2 (ref 60–?)
EOSINOPHIL # BLD AUTO: 0.11 X10(3) UL (ref 0–0.7)
EOSINOPHIL NFR BLD AUTO: 1.4 %
ERYTHROCYTE [DISTWIDTH] IN BLOOD BY AUTOMATED COUNT: 13.3 %
EST. AVERAGE GLUCOSE BLD GHB EST-MCNC: 140 MG/DL (ref 68–126)
FASTING PATIENT LIPID ANSWER: YES
FASTING STATUS PATIENT QL REPORTED: YES
GLOBULIN PLAS-MCNC: 3.8 G/DL (ref 2.8–4.4)
GLUCOSE BLD-MCNC: 121 MG/DL (ref 70–99)
HBA1C MFR BLD: 6.5 % (ref ?–5.7)
HCT VFR BLD AUTO: 45.7 %
HDLC SERPL-MCNC: 57 MG/DL (ref 40–59)
HGB BLD-MCNC: 15.3 G/DL
IMM GRANULOCYTES # BLD AUTO: 0.02 X10(3) UL (ref 0–1)
IMM GRANULOCYTES NFR BLD: 0.3 %
LDLC SERPL CALC-MCNC: 86 MG/DL (ref ?–100)
LYMPHOCYTES # BLD AUTO: 2.42 X10(3) UL (ref 1–4)
LYMPHOCYTES NFR BLD AUTO: 30.7 %
MCH RBC QN AUTO: 29 PG (ref 26–34)
MCHC RBC AUTO-ENTMCNC: 33.5 G/DL (ref 31–37)
MCV RBC AUTO: 86.7 FL
MONOCYTES # BLD AUTO: 0.45 X10(3) UL (ref 0.1–1)
MONOCYTES NFR BLD AUTO: 5.7 %
NEUTROPHILS # BLD AUTO: 4.85 X10 (3) UL (ref 1.5–7.7)
NEUTROPHILS # BLD AUTO: 4.85 X10(3) UL (ref 1.5–7.7)
NEUTROPHILS NFR BLD AUTO: 61.6 %
NONHDLC SERPL-MCNC: 115 MG/DL (ref ?–130)
OSMOLALITY SERPL CALC.SUM OF ELEC: 291 MOSM/KG (ref 275–295)
PLATELET # BLD AUTO: 263 10(3)UL (ref 150–450)
POTASSIUM SERPL-SCNC: 4.2 MMOL/L (ref 3.5–5.1)
PROT SERPL-MCNC: 7.9 G/DL (ref 6.4–8.2)
RBC # BLD AUTO: 5.27 X10(6)UL
SODIUM SERPL-SCNC: 139 MMOL/L (ref 136–145)
TRIGL SERPL-MCNC: 172 MG/DL (ref 30–149)
TSI SER-ACNC: 0.9 MIU/ML (ref 0.36–3.74)
VLDLC SERPL CALC-MCNC: 27 MG/DL (ref 0–30)
WBC # BLD AUTO: 7.9 X10(3) UL (ref 4–11)

## 2024-04-12 PROCEDURE — 80061 LIPID PANEL: CPT | Performed by: NURSE PRACTITIONER

## 2024-04-12 PROCEDURE — 85025 COMPLETE CBC W/AUTO DIFF WBC: CPT | Performed by: NURSE PRACTITIONER

## 2024-04-12 PROCEDURE — 84443 ASSAY THYROID STIM HORMONE: CPT | Performed by: NURSE PRACTITIONER

## 2024-04-12 PROCEDURE — 83036 HEMOGLOBIN GLYCOSYLATED A1C: CPT | Performed by: NURSE PRACTITIONER

## 2024-04-12 PROCEDURE — 99213 OFFICE O/P EST LOW 20 MIN: CPT | Performed by: ORTHOPAEDIC SURGERY

## 2024-04-12 PROCEDURE — 80053 COMPREHEN METABOLIC PANEL: CPT | Performed by: NURSE PRACTITIONER

## 2024-04-12 RX ORDER — CLOBETASOL PROPIONATE 0.46 MG/ML
SOLUTION TOPICAL
Qty: 25 ML | Refills: 0 | Status: SHIPPED | OUTPATIENT
Start: 2024-04-12

## 2024-04-12 RX ORDER — CYCLOBENZAPRINE HCL 10 MG
10 TABLET ORAL 3 TIMES DAILY PRN
Qty: 12 TABLET | Refills: 0 | Status: SHIPPED | OUTPATIENT
Start: 2024-04-12

## 2024-04-12 RX ORDER — CLOTRIMAZOLE AND BETAMETHASONE DIPROPIONATE 10; .64 MG/G; MG/G
1 CREAM TOPICAL 2 TIMES DAILY PRN
Qty: 60 G | Refills: 1 | Status: SHIPPED | OUTPATIENT
Start: 2024-04-12

## 2024-04-12 RX ORDER — CLOBETASOL PROPIONATE 0.05 G/100ML
SHAMPOO TOPICAL
Qty: 120 ML | Refills: 2 | Status: SHIPPED | OUTPATIENT
Start: 2024-04-12 | End: 2024-04-12

## 2024-04-12 NOTE — TELEPHONE ENCOUNTER
Received fax from [employer] regarding PA request: Clobetasol Propionate (CLOBEX) 0.05 % External Shampoo     \"Plan does not cover this medication. Clobetasol 0.05% solution is covered\"    Order(s) pending, please review. Thank you.

## 2024-04-12 NOTE — PROGRESS NOTES
CHIEF COMPLAINT:    Chief Complaint   Patient presents with    Physical       HISTORY OF PRESENT ILLNESS:    Pooja Lilly is a 64 year old female who presents today, April 12, 2024, for an adult physical.    - NUTRITION:  Follows a lower sodium diet; avoid caffeine, nicotine, and chocolate diet.  Drinks approximately 64oz of water a day.   - SLEEP: Sleep study is due.   - SAFETY:  Reports feeling safe at home.  Wears seatbelt   - PHYSICAL ACTIVITY/SOCIAL/HOBBIES:  Water aerobics, 5 days a week.     - GOAL:  Pooja would like to complete fasting labs today.    IMMUNIZATIONS:  Declines today    ENT - Dr. Ann and Lopez Moss - for bilateral tinnitus and sensorineural hearing loss of both ears after covid vaccination. Possibly experienced decreased cochlear blood flow following her COVID and shingles vaccines. Due for repeat audiogram 03/27/2024.     Endocrinology - Dr. Hetal Razo - history of thyroid nodule with benign fine needle aspiration.  Due for repeat thyroid ultrasound 2024.     Gastroenterology - Dr. Brenton Chung with [facility] for hx of rectal prolapse and routine colonoscopies.  Last colonoscopy completed 07/28/2023, with plan to repeat colonoscopy in five years due to presence of adenomas.  Next colonoscopy due 07/28/2028.     Hematology/Oncology - Dr. Jesus Zamora with [facility] for hx of carcinoma of right lower-outer quadrant, estrogen receptor positive.  Hx of right mastectomy, right upper extremity/limb precaution.     Dermatology - Dr. Deb Sheth for dermatitis of scalp, previously treated with clobetasol 0.05% solution.     Gynecology Dr. Cira Hernandez - for stage II prolapse of anterior and posterior vaginal walls with foreshortening of vagina.  Avoiding vaginal estrogen d/t hx of breast carcinoma.       Ortho specialists - Dr. Emerald Navas, Dr. Robert Carbone, Dr. Vaughn Phan for left shoulder pain, left knee pain, and hip pain.  Hx of revision of left hip replacement for  osteolysis and three surgeries to right hip by Dr. Ramsey in 2020 and 2019.  She has completed left shoulder surgery due to rotator cuff tear with full thickness, full width tendon tears of distal supraspinatus and infraspinatus tendons at footplate insertion, medial dislocation of biceps tendon and fraying of superior glenoid labrum on 11/14/2023 with Dr. Navas.      Patient Active Problem List   Diagnosis    Carcinoma of lower-outer quadrant of right breast in female, estrogen receptor positive (HCC)    Diverticulosis of colon    Elevation of level of transaminase and lactic acid dehydrogenase (LDH)    Acquired hammer toe    Obstructive sleep apnea syndrome    Symptomatic menopausal or female climacteric states    Acquired absence of breast and absent nipple    Essential hypertension    Vitamin B12 deficiency    S/P ORIF (open reduction internal fixation) fracture    Status post left hip replacement    Osteoarthritis of knee    Thyroid nodule    Tubular adenoma of colon    Failed total hip arthroplasty (HCC)    History of right hip replacement    Hyperlipidemia    Metabolic syndrome X    Obesity    Prediabetes    Acquired spondylolisthesis    Breakage of joint prosthesis (HCC)    Degeneration of lumbar intervertebral disc    Intrapelvic protrusion acetabulum, pelvic region and thigh    Uterine prolapse    Cystocele, midline    Morbid (severe) obesity due to excess calories (HCC)    Benign neoplasm of ascending colon    Benign neoplasm of sigmoid colon    Toxic effect of other metals, accidental (unintentional), initial encounter        ALLERGIES:  Allergies   Allergen Reactions    Covid-19 Mrna Vacc (Moderna) [ Covid-19 Mrna Vacc (Moderna)] TINITUS    Taxotere [Docetaxel] RASH     Other reaction(s): Unknown       CURRENT MEDICATIONS:  Current Outpatient Medications   Medication Sig Dispense Refill    clotrimazole-betamethasone 1-0.05 % External Cream Apply 1 Application topically 2 (two) times daily as needed. 60  g 1    cyclobenzaprine 10 MG Oral Tab Take 1 tablet (10 mg total) by mouth 3 (three) times daily as needed for Muscle spasms. 12 tablet 0    ezetimibe 10 MG Oral Tab Take 1 tablet (10 mg total) by mouth daily. 90 tablet 1    TURMERIC CURCUMIN OR Take 1 tablet by mouth daily.      amoxicillin 500 MG Oral Tab TAKE 4 TABLETS BY MOUTH ONE HOUR PRIOR TO DENTAL APPOINTMENT      Lancets (ONETOUCH DELICA PLUS [id number]) Does not apply Misc 1 strip by In Vitro route 2 (two) times daily.      rosuvastatin 10 MG Oral Tab Take 1 tablet (10 mg total) by mouth nightly. 90 tablet 0    Blood Glucose Monitoring Suppl (ONETOUCH VERIO REFLECT) w/Device Does not apply Kit 1 strip by In Vitro route daily.      magnesium oxide 400 MG Oral Tab Take 1 tablet (400 mg total) by mouth daily.      acidophilus-pectin Oral Cap Take 1 capsule by mouth daily.      Coenzyme Q10 (COQ10) 100 MG Oral Cap       ASPIRIN 81 OR Take by mouth.      Multiple Vitamins-Minerals (ICAPS LUTEIN & ZEAXANTHIN OR) Take 1 capsule by mouth every other day.      Calcium Carbonate-Vitamin D (CALCIUM-VITAMIN D) 500-200 MG-UNIT Oral Tab Take 1 tablet by mouth 2 (two) times daily with meals.      omega-3 fatty acids 1000 MG Oral Cap Take 1,000 mg by mouth 2 (two) times daily.      clobetasol 0.05 % External Solution Apply to scalp leave on 5-10mintues, lather, rinse out 3 times weekly as directed 25 mL 0       MEDICAL HISTORY:  Past Medical History:    Acquired absence of breast and absent nipple    Acquired hammer toe    Acquired spondylolisthesis    Arthritis    Benign tumor of thyroid gland    Breakage of joint prosthesis (HCC)    Carcinoma of lower-outer quadrant of right breast in female, estrogen receptor positive (HCC)    Deep vein thrombosis (HCC)    rt leg    Degeneration of lumbar intervertebral disc    Diverticulosis of colon    2/26/2018 Moderate sigmoid diverticulosis noted on colonoscopy    Elevation of level of transaminase and lactic acid dehydrogenase  (LDH)    Essential hypertension    Failed total hip arthroplasty (HCC)    H/O arthroscopic knee surgery    H/O:     Hearing impairment    Tinnitus    High cholesterol    History of adenomatous polyp of colon    History of cardiac murmur    History of hip replacement    Hyperlipidemia    Intrapelvic protrusion acetabulum, pelvic region and thigh    Metabolic syndrome X    Neuropathy    Obesity    Obstructive sleep apnea syndrome    severe in REM Using CPAP    Osteoarthritis    Osteoarthritis of knee    Pap smear for cervical cancer screening    Pap & HPV negative.    Personal history of antineoplastic chemotherapy    Last treatment 2014    PONV (postoperative nausea and vomiting)    Postmenopausal bleeding    Prediabetes    Presence of other cardiac implants and grafts    Rectal prolapse    Rectocele    S/P bunionectomy    S/P ORIF (open reduction internal fixation) fracture    Sleep apnea    CPAP    Status post left hip replacement    Strain of flexor muscle of left hip    Symptomatic menopausal or female climacteric states    Thyroid nodule    Saw Endocrinology in  at [place].  Benita Velázquez    Tubular adenoma of colon    Per Dr. Quiroga    Tubular adenoma of colon    Tubular adenoma in descending colon & rectum.    Uterine prolapse    Visual impairment    readers    Vitamin B12 deficiency     Past Surgical History:   Procedure Laterality Date    Arthroscopy of joint unlisted Right     knee          Colonoscopy & polypectomy  2018    Tubular adenoma in descending colon & rectum. Recall 5 years.    Cryocautery of cervix      in her 30s    Hc endometrial sampling w or wo endocerv sampling  2022    endometrial biopsy benign - Polypoid fragments of benign endometrium. Dr. Cira Smith    Hip replacement surgery      Mastectomy right Right     for breast cancer    Other surgical history Left 2017    Hammer Toe fixed     Reduction left Left     after the  mastectomy    Tonsillectomy      Total hip replacement Right     [facility]    Total hip replacement Right 05/16/2018    revision    Total hip replacement Left      Family History   Problem Relation Age of Onset    Breast Cancer Self 54    Diabetes Mother     Hypertension Mother     Heart Disorder Mother         Afib    Endometriosis Mother     Heart Attack Father     Stroke Father     Diabetes Father     Colon Polyps Father     Other (Other) Sister         fibromyalgia    Other (fibroids) Sister     Crohn's Disease Brother     Colon Polyps Brother     Breast Cancer Maternal Grandmother         60s    Prostate Cancer Maternal Grandfather         70s    Breast Cancer Maternal Aunt         not sure of age at onset. Approx 65-70    Ovarian Cancer Neg     Colon Cancer Neg     Pancreatic Cancer Neg     Endometrial Cancer Neg      Family Status   Relation Status    Self Other    Mo Alive    Fa Alive    Sis (Not Specified)    Bro (Not Specified)    MGMA (Not Specified)    MGFA (Not Specified)    Mat Aunt (Not Specified)    NEG (Not Specified)     Social History     Socioeconomic History    Marital status:    Tobacco Use    Smoking status: Never    Smokeless tobacco: Never   Vaping Use    Vaping status: Never Used   Substance and Sexual Activity    Alcohol use: Yes     Alcohol/week: 2.0 standard drinks of alcohol     Types: 2 Standard drinks or equivalent per week    Drug use: No    Sexual activity: Yes     Partners: Male   Other Topics Concern    History of tanning No    Outdoor occupation No    Reaction to local anesthetic No   Social History Narrative    Father is alive. Mother is alive.     The patient indicates family history of colon cancer (grandmother), heart disease (father, mother, grandfather), stroke (grandfather), coronary artery disease (father, grandfather), hypertension (mother), diabetes (mother), eye problems (grandmother). Other family history includes: m- aunt- breast cancer age 58yr     mom- a-fib, DM,HTN    dad- Cardiac stents age 69- another stent    p-gma- macular degen.         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 She has been employed for 30 years. Is working part time substitute teaching          The highest level of education achieved by Pooja was masters in Education/exercise phys    . She has pets that include 1 cat,  ciows.  no horses. Short horn cow beef cattle.    2nd in nation          Pooja lives alone .     She lives at home in [place].     She is  with 2 children. Has close friend.     One sexual partner.     Pooja's Church is [employer].         DPOAHC:  Pt states that her son, Luis Guallpa: [phone] if unable son Antonio.     Living will: has one on file. States that she doesn't want extraordinary measures.     Full code. No long term ventilation. Ok with feeding tubes with stroke.       Social Determinants of Health      [facility], [facility]    Social Connections    [facility] from [facility]    Housing Stability       ROS:    GENERAL:  Denies fever or chills  RESPIRATORY:  Denies difficulty breathing  CARDIAC:  Denies chest pain with exertion  GI:  Denies nausea, vomiting, diarrhea, constipation, or blood in stool  :  Denies blood in urine or painful urination  REPRO:  Denies vaginal discharge  NEURO:  Denies recent falls   MSKL:  +right hip  SKIN:  Denies change in texture of moles   PSYCH: Denies thoughts of self harm or harming others     VITALS:    /88 (BP Location: Right arm, Patient Position: Sitting)   Pulse 72   Temp 98.8 °F (37.1 °C) (Temporal)   Resp 16   Ht 5' 6\" (1.676 m)   Wt 226 lb (102.5 kg)   BMI 36.48 kg/m²   Ideal body weight: 59.3 kg (130 lb 11.7 oz)  Adjusted ideal body weight: 76.6 kg (168 lb 13.4 oz)  Wt Readings from Last 3 Encounters:   04/12/24 226 lb (102.5 kg)   11/14/23 223 lb 3.2 oz (101.2 kg)   10/12/23  226 lb (102.5 kg)     BP Readings from Last 3 Encounters:   04/12/24 142/88   11/14/23 135/87   10/12/23 132/78     Reviewed by Cristela Shaikh MS, APRN, FNP-BC    PHYSICAL EXAM:    Constitutional:       Appearance: Normal appearance.  Sitting upright on exam table.  Well developed, well nourished, and in no acute distress.  HENT:      Head: Facial features symmetric. Normocephalic and atraumatic.      Right Ear: Canal clear without erythema or drainage.  TM clear and intact, neutral in position.      Left Ear: Canal clear without erythema or drainage.  TM clear and intact, neutral in position.      Nose: Nose normal.      Mouth/Throat: Mucous membranes are moist.  Uvula rises midline.  Eyes:      Extraocular Movements: Extraocular movements intact.      Conjunctiva/sclera: Conjunctivae normal. Sclera anicteric         Pupils: Pupils are equal, round, and reactive to light.   Neck:     Neck is supple. Trachea is midline.  No masses.   Cardiovascular:      Heart sounds: Regular rate and rhythm      No pitting edema of BLE  Pulmonary:      Effort: Pulmonary effort is normal.      Breath sounds: Lungs clear throughout.     No cough or wheezing.  Abdominal:      General: Abdomen is nondistended, bowel sounds normoactive, soft, nontender.  No organomegaly.  Musculoskeletal:         General: Normal range of motion. Strength of extremities are equal bilaterally.     Range of motion without limitations.  Skin:     General: Skin is warm and dry.      Coloration: Skin is without jaundice     Findings: No bruising or rashes  Neurological:      General: No focal deficit present. Speech is clear and organized.     Mental Status: Alert and oriented to person, place, and time.      Sensory: Sensation is intact.      Motor: Motor function is intact. Movements are smooth and controlled without ataxia.     Coordination: Coordination is intact. Coordination normal.      Gait: Gait steady and nonantalgic.   Psychiatric:         Mood  and Affect: Mood normal.         Behavior: Behavior normal.         Thought Content: Thought content normal.         Judgment: Judgment normal.     ASSESSMENT & PLAN:  Plan on follow-up in 1 year or earlier if needed  Refer to result notes for further information    Begin monitoring blood pressure    1. Annual physical exam  - Lipid Panel [E]; Future  - Comp Metabolic Panel (14) [E]; Future  - CBC W Differential W Platelet [E]; Future  - HGB A1C [496] [Q]; Future  - TSH W Reflex To Free T4 [E]; Future  - VENIPUNCTURE  - Audiometry  - Lipid Panel [E]  - Comp Metabolic Panel (14) [E]  - CBC W Differential W Platelet [E]  - HGB A1C [496] [Q]  - TSH W Reflex To Free T4 [E]    2. Seborrheic dermatitis  - clotrimazole-betamethasone 1-0.05 % External Cream; Apply 1 Application topically 2 (two) times daily as needed.  Dispense: 60 g; Refill: 1    3. Muscle spasm  - cyclobenzaprine 10 MG Oral Tab; Take 1 tablet (10 mg total) by mouth 3 (three) times daily as needed for Muscle spasms.  Dispense: 12 tablet; Refill: 0    4. Tinnitus of both ears  - Audiometry  - Audiology Referral - External

## 2024-04-12 NOTE — PROGRESS NOTES
[facility]ED[facility]  [address] [place] [address]  [phone]       Name: Pooja Lilly   MRN: [id number]  Date: 4/12/2024     REASON FOR VISIT: Fourth Post-Surgical Visit   Surgery: Left shoulder rotator cuff repair (3 tendon repair), biceps tenodesis, subacromial decompression, distal clavicle excision 11/14/2023.       INTERVAL HISTORY:  Pooja Lilly is a 64 year old female who returns after the aforementioned procedure.  The post-operative course has been unremarkable with pain well controlled and overall progress noted.     Physical therapy was started and is progressing well. She has noted improvement, and [facility] Nearing the completion of PT. Aquatic therapy has been very beneficial.       PE:   There were no vitals filed for this visit.  Estimated body mass index is 36.03 kg/m² as calculated from the following:    Height as of 10/12/23: 5' 6\" (1.676 m).    Weight as of 11/14/23: 223 lb 3.2 oz (101.2 kg).    Physical Exam  Constitutional:       Appearance: Normal appearance.   HENT:      Head: Normocephalic and atraumatic.   Eyes:      Extraocular Movements: Extraocular movements intact.   Neck:      Musculoskeletal: Normal range of motion and neck supple.   Cardiovascular:      Pulses: Normal pulses.   Pulmonary:      Effort: Pulmonary effort is normal. No respiratory distress.   Abdominal:      General: There is no distension.   Skin:     General: Skin is warm.      Capillary Refill: Capillary refill takes less than 2 seconds.      Findings: No bruising.   Neurological:      General: No focal deficit present.      Mental Status: She is alert.   Psychiatric:         Mood and Affect: Mood normal.     Examination of the left shoulder demonstrates:     Physical examination the patient is alert and oriented x3, well-developed, well-nourished, no acute distress.     5-/5 strength, 110 of flexion - assisted to 160. 45 of ER, and IR to  T12.     The contralateral shoulder is without limitation in range of motion or strength, no positive provocative maneuvers.     IMPRESSION: Pooja Lilly is a 64 year old female who presents almost 5 months s/p Left shoulder rotator cuff repair (3 tendon repair), biceps tenodesis, subacromial decompression, distal clavicle excision 11/14/2023.       PLAN:   We had a lengthy discussion with the patient regarding the patient's findings consistent with the expected postoperative course. We recommend continuation of physical therapy with rehabilitation efforts focused on strengthening, range of motion, functional ability, and return to baseline activity. The patient can continue to progress per protocol.    All questions were answered appropriately and the patient was in agreement with the treatment plan.       FOLLOW-UP:  3-4 month followup.         Emerald Navas MD  Knee, Shoulder, & Elbow Surgery / Sports Medicine Specialist  Orthopaedic Surgery  [address] [place] [address].org  [email or web address]  t: [phone]  o: [phone]  f: [phone]     This note was dictated using Dragon software.  While it was briefly proofread prior to completion, some grammatical, spelling, and word choice errors due to dictation may still occur.

## 2024-04-15 ENCOUNTER — TELEPHONE (OUTPATIENT)
Dept: FAMILY MEDICINE CLINIC | Facility: CLINIC | Age: 64
End: 2024-04-15

## 2024-04-15 PROBLEM — R73.09 ELEVATED HEMOGLOBIN A1C: Status: ACTIVE | Noted: 2024-04-15

## 2024-04-15 NOTE — TELEPHONE ENCOUNTER
April 15, 2024         [address]  [facility] [address]  Phone: [phone]  Fax: [phone]       Patient: Juan Carlos Kirkland   YOB: 2005  Date of Visit: 04/15/2024    To Whom It May Concern:    Luis A Kirkland  was at [facility] on 04/15/2024.  If you have any questions or concerns, or if I can be of further assistance, please do not hesitate to contact me.    Sincerely,    Reyna Rosas RN      Patient is having her hip replacement replaced on 5/15/18  States she has had for 15 years and now needs new one  She had ortho appt this morning and is off work today so she was hoping to get in for her pre-op  Patient has her pre-op orders with her  Made

## 2024-04-15 NOTE — TELEPHONE ENCOUNTER
Written by CHAVA Gaspar on 4/15/2024  9:56 AM CDT View Full Comments  Seen by patient Pooja Tiana Lilly on 4/15/2024  2:31 PM

## 2024-04-15 NOTE — TELEPHONE ENCOUNTER
----- Message from CHAVA Gaspar sent at 4/15/2024  9:56 AM CDT -----  LIPID PANEL - triglycerides are elevated - this will improve as your able to tolerate more physical activity - good news, it has improved compared to 1 year ago. Continue omega 3 fatty acid.    CMP - blood sugar and calcium are a bit elevated otherwise electrolytes, kidney function, liver enzymes and protein levels are stable.  Continue to work towards drinking 64oz of water a day and cleaning up diet.    A1C - is 6.5, indicating diabetic range, lets modify lifestyle and diet a bit more (staying away from processed foods) - recheck labs in 3 months.  If A1C remains elevated we can discuss medication.  If you would like to start medication before then, please schedule video visit to discuss.    TSH - thyroid is functioning normally    CBC - blood counts are all stable

## 2024-04-17 ENCOUNTER — APPOINTMENT (OUTPATIENT)
Dept: PHYSICAL THERAPY | Age: 64
End: 2024-04-17
Attending: ORTHOPAEDIC SURGERY
Payer: COMMERCIAL

## 2024-04-17 ENCOUNTER — TELEPHONE (OUTPATIENT)
Dept: FAMILY MEDICINE CLINIC | Facility: CLINIC | Age: 64
End: 2024-04-17

## 2024-04-17 NOTE — TELEPHONE ENCOUNTER
PATIENT CALLING THIS AFTERNOON JUST TO MAKE SURE WE HAVE A REFERRAL FOR HER FOR THE US OF THYROID.  PATIENT IS ASKING IF [facility] WOULD GIVE HER A CALL BACK REGARDING TEST RESULTS.  I ADVISED PATIENT THAT THE REFERRAL FOR US  THYROID IS IN.

## 2024-04-20 NOTE — TELEPHONE ENCOUNTER
Pt states she has still not heard from .   Pt also stating order for ultrasound not faxed over.      FAX:  [phone]    Phone [phone]    Dr. Hetal Razo    Order Faxed 4/20/24 - Pt still wants to hear from

## 2024-04-20 NOTE — TELEPHONE ENCOUNTER
Pt requesting for US thyroid order to be faxed to fax#[phone] - [facility] scheduling  (03/14/24 US thyroid order faxed to #[phone])    Pt wants to talk to Cristela LARSEN to discuss weight loss, elevated A1C, sleep apnea, etc - advised pt to schedule appt to discuss plan of care with Cristela LARSEN - she v/u    Future Appointments   Date Time Provider Department Center   4/22/2024 10:00 AM Cristela Shaikh APRN [facility] [place]     Routing as JIMMY

## 2024-04-21 NOTE — TELEPHONE ENCOUNTER
Can we call Pooja to let her know about her results?    MCM sent; however, patient stating she hasn't heard from us according to Jeniffer's note  Recommending video visit if patient would like to discuss further    Hi Pooja,     Here are some notes regarding your lab results:     LIPID PANEL - triglycerides are elevated - this level should improve as you're able to tolerate more physical activity - good news is this level has improved compared to 1 year ago. Continue omega 3 fatty acid.     CMP - blood sugar and calcium are a bit elevated otherwise electrolytes, kidney function, liver enzymes and protein levels are stable.  Continue to work towards drinking 64oz of water a day and cleaning up diet.     A1C - is 6.5, indicating diabetic range, lets modify lifestyle and diet a bit more (staying away from processed foods, including more fiber rich foods) - recheck labs in 3 months.  If A1C remains elevated we can discuss medication.  If you would like to start medication before then, please schedule video visit to discuss.     TSH - thyroid is functioning normally     CBC - blood counts are all stable     Please call central scheduling at [phone] to get your lab appointment scheduled.    Let us know if you have any further questions.     Thank you,     Cristela REGALADO, CHAVA, FN-[facility]  [phone]   Written by CHAVA Gaspar on 4/15/2024  9:56 AM CDT  Seen by patient Pooja Larai on 4/18/2024  6:25 AM

## 2024-04-22 ENCOUNTER — OFFICE VISIT (OUTPATIENT)
Dept: FAMILY MEDICINE CLINIC | Facility: CLINIC | Age: 64
End: 2024-04-22
Payer: COMMERCIAL

## 2024-04-22 VITALS
OXYGEN SATURATION: 95 % | DIASTOLIC BLOOD PRESSURE: 80 MMHG | TEMPERATURE: 98 F | HEIGHT: 66 IN | RESPIRATION RATE: 16 BRPM | HEART RATE: 67 BPM | WEIGHT: 225 LBS | BODY MASS INDEX: 36.16 KG/M2 | SYSTOLIC BLOOD PRESSURE: 126 MMHG

## 2024-04-22 DIAGNOSIS — Z71.3 WEIGHT LOSS COUNSELING, ENCOUNTER FOR: Primary | ICD-10-CM

## 2024-04-22 PROCEDURE — 3079F DIAST BP 80-89 MM HG: CPT | Performed by: NURSE PRACTITIONER

## 2024-04-22 PROCEDURE — 99213 OFFICE O/P EST LOW 20 MIN: CPT | Performed by: NURSE PRACTITIONER

## 2024-04-22 PROCEDURE — 3074F SYST BP LT 130 MM HG: CPT | Performed by: NURSE PRACTITIONER

## 2024-04-22 PROCEDURE — 3008F BODY MASS INDEX DOCD: CPT | Performed by: NURSE PRACTITIONER

## 2024-04-22 NOTE — PROGRESS NOTES
CHIEF COMPLAINT:    Chief Complaint   Patient presents with    Follow - Up     Discuss weight loss, lab results and sleep apnea       HISTORY OF PRESENT ILLNESS:    Pooja presents today, April 22, 2024, to discuss results and weight loss injections.  Reports family history of thyroid cancer in maternal uncle.  Is also scheduled for thyroid ultrasound later today and sees endocrinology team.  Advised against weight loss injections at this time.  Pooja is agreeable to await results of ultrasound and follow-up with endocrinology.  Declines referral to weight loss clinic.    Diet, protein shakes and yogurt.  Unknown amount of calories daily, estimating approximately 4452-3623/day.  Discussed subtracting 500 calories a day from current intake, no less than 1500/day.  Continue substituting carbs.  Begin calorie counting.    ALLERGIES:  Allergies   Allergen Reactions    Covid-19 Mrna Vacc (Moderna) [ Covid-19 Mrna Vacc (Moderna)] TINITUS    Taxotere [Docetaxel] RASH     Other reaction(s): Unknown       CURRENT MEDICATIONS:  Current Outpatient Medications   Medication Sig Dispense Refill    clotrimazole-betamethasone 1-0.05 % External Cream Apply 1 Application topically 2 (two) times daily as needed. 60 g 1    cyclobenzaprine 10 MG Oral Tab Take 1 tablet (10 mg total) by mouth 3 (three) times daily as needed for Muscle spasms. 12 tablet 0    clobetasol 0.05 % External Solution Apply to scalp leave on 5-10mintues, lather, rinse out 3 times weekly as directed 25 mL 0    ezetimibe 10 MG Oral Tab Take 1 tablet (10 mg total) by mouth daily. 90 tablet 1    TURMERIC CURCUMIN OR Take 1 tablet by mouth daily.      amoxicillin 500 MG Oral Tab TAKE 4 TABLETS BY MOUTH ONE HOUR PRIOR TO DENTAL APPOINTMENT      Lancets (ONETOUCH DELICA PLUS QETAXU33R) Does not apply Misc 1 strip by In Vitro route 2 (two) times daily.      rosuvastatin 10 MG Oral Tab Take 1 tablet (10 mg total) by mouth nightly. 90 tablet 0    Blood Glucose Monitoring  Suppl (ONETOUCH VERIO REFLECT) w/Device Does not apply Kit 1 strip by In Vitro route daily.      magnesium oxide 400 MG Oral Tab Take 1 tablet (400 mg total) by mouth daily.      acidophilus-pectin Oral Cap Take 1 capsule by mouth daily.      Coenzyme Q10 (COQ10) 100 MG Oral Cap       ASPIRIN 81 OR Take by mouth.      Multiple Vitamins-Minerals (ICAPS LUTEIN & ZEAXANTHIN OR) Take 1 capsule by mouth every other day.      Calcium Carbonate-Vitamin D (CALCIUM-VITAMIN D) 500-200 MG-UNIT Oral Tab Take 1 tablet by mouth 2 (two) times daily with meals.      omega-3 fatty acids 1000 MG Oral Cap Take 1,000 mg by mouth 2 (two) times daily.         MEDICAL HISTORY:  Past Medical History:    Acquired absence of breast and absent nipple    Acquired hammer toe    Acquired spondylolisthesis    Arthritis    Benign tumor of thyroid gland    Breakage of joint prosthesis (HCC)    Carcinoma of lower-outer quadrant of right breast in female, estrogen receptor positive (HCC)    Deep vein thrombosis (HCC)    rt leg    Degeneration of lumbar intervertebral disc    Diverticulosis of colon    2018 Moderate sigmoid diverticulosis noted on colonoscopy    Elevation of level of transaminase and lactic acid dehydrogenase (LDH)    Essential hypertension    Failed total hip arthroplasty (HCC)    H/O arthroscopic knee surgery    H/O:     Hearing impairment    Tinnitus    High cholesterol    History of adenomatous polyp of colon    History of cardiac murmur    History of hip replacement    Hyperlipidemia    Intrapelvic protrusion acetabulum, pelvic region and thigh    Metabolic syndrome X    Neuropathy    Obesity    Obstructive sleep apnea syndrome    severe in REM Using CPAP    Osteoarthritis    Osteoarthritis of knee    Pap smear for cervical cancer screening    Pap & HPV negative.    Personal history of antineoplastic chemotherapy    Last treatment 2014    PONV (postoperative nausea and vomiting)    Postmenopausal bleeding     Prediabetes    Presence of other cardiac implants and grafts    Rectal prolapse    Rectocele    S/P bunionectomy    S/P ORIF (open reduction internal fixation) fracture    Sleep apnea    CPAP    Status post left hip replacement    Strain of flexor muscle of left hip    Symptomatic menopausal or female climacteric states    Thyroid nodule    Saw Endocrinology in December following at [facility].  Benita Velázquez    Tubular adenoma of colon    Per Dr. Quiroga    Tubular adenoma of colon    Tubular adenoma in descending colon & rectum.    Uterine prolapse    Visual impairment    readers    Vitamin B12 deficiency     Past Surgical History:   Procedure Laterality Date    Arthroscopy of joint unlisted Right     knee          Colonoscopy & polypectomy  2018    Tubular adenoma in descending colon & rectum. Recall 5 years.    Cryocautery of cervix      in her 30s    Hc endometrial sampling w or wo endocerv sampling  2022    endometrial biopsy benign - Polypoid fragments of benign endometrium. Dr. Cira Smith    Hip replacement surgery      Mastectomy right Right     for breast cancer    Other surgical history Left 2017    Hammer Toe fixed     Reduction left Left     after the mastectomy    Tonsillectomy      Total hip replacement Right     [facility]    Total hip replacement Right 2018    revision    Total hip replacement Left      Family History   Problem Relation Age of Onset    Breast Cancer Self 54    Diabetes Mother     Hypertension Mother     Heart Disorder Mother         Afib    Endometriosis Mother     Heart Attack Father     Stroke Father     Diabetes Father     Colon Polyps Father     Other (Other) Sister         fibromyalgia    Other (fibroids) Sister     Crohn's Disease Brother     Colon Polyps Brother     Breast Cancer Maternal Grandmother         60s    Prostate Cancer Maternal Grandfather         70s    Breast Cancer Maternal Aunt         not sure of age at onset.  Approx 65-70    Ovarian Cancer Neg     Colon Cancer Neg     Pancreatic Cancer Neg     Endometrial Cancer Neg      Family Status   Relation Status    Self Other    Mo Alive    Fa Alive    Sis (Not Specified)    Bro (Not Specified)    MGMA (Not Specified)    MGFA (Not Specified)    Mat Aunt (Not Specified)    NEG (Not Specified)     Social History     Socioeconomic History    Marital status:    Tobacco Use    Smoking status: Never    Smokeless tobacco: Never   Vaping Use    Vaping status: Never Used   Substance and Sexual Activity    Alcohol use: Yes     Alcohol/week: 2.0 standard drinks of alcohol     Types: 2 Standard drinks or equivalent per week    Drug use: No    Sexual activity: Yes     Partners: Male   Other Topics Concern    History of tanning No    Outdoor occupation No    Reaction to local anesthetic No   Social History Narrative    Father is alive. Mother is alive.     The patient indicates family history of colon cancer (grandmother), heart disease (father, mother, grandfather), stroke (grandfather), coronary artery disease (father, grandfather), hypertension (mother), diabetes (mother), eye problems (grandmother). Other family history includes: m- aunt- breast cancer age 58yr    mom- a-fib, DM,HTN    dad- Cardiac stents age 69- another stent    p-gma- macular degen.         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 She has been employed for 30 years. Is working part time substitute teaching          The highest level of education achieved by Pooja was masters in Education/exercise phys    . She has pets that include 1 cat,  ciows.  no horses. Short horn cow beef cattle.    2nd in [facility]          Pooja lives alone .     She lives at home in [place].     She is  with 2 children. Has close friend.     One sexual partner.     Pooja's [employer] is [employer].         DPOAHC:  Pt states that her son, Luis Guallpa: [phone] if unable son Antonio.     Living will: has one on file. States  that she doesn't want extraordinary measures.     Full code. No long term ventilation. Ok with feeding tubes with stroke.       Social Determinants of Health      [facility]    Social Connections    [facility] from [facility]    Housing Stability       ROS:  Denies additional concerns today    VITALS:   /80   Pulse 67   Temp 97.7 °F (36.5 °C) (Temporal)   Resp 16   Ht 5' 6\" (1.676 m)   Wt 225 lb (102.1 kg)   SpO2 95%   BMI 36.32 kg/m²     Reviewed by Cristela Shaikh MS, APRN, FNP-BC    PHYSICAL EXAM:    Constitutional:       Appears well.  Sitting upright on exam table.  Well developed, well nourished, and in no acute distress  HEENT:      Facial features symmetric. Normocephalic and atraumatic     Sclera anicteric.  Neck supple.  Pulmonary:      Chest expansion symmetric.  Breathing nonlabored.  Musculoskeletal:         Movements smooth and controlled with appropriate coordination.       Gait is steady, mildly antalgic.  Neuro:       No focal deficits, cranial nerves grossly intact.       Movements smooth and controlled, appropriate coordination without ataxia or tremors.  Skin:     Warm and dry without jaundice.  Psychiatric:         Alert and oriented.  Calm and cooperative.  Speech is clear.       ASSESSMENT & PLAN:    1. Weight loss counseling, encounter for  Begin calorie counting  Complete thyroid US  Consider discussion with endocrinology team  Declines weight loss clinic referral  Recheck A1C as scheduled

## 2024-04-24 ENCOUNTER — TELEPHONE (OUTPATIENT)
Dept: FAMILY MEDICINE CLINIC | Facility: CLINIC | Age: 64
End: 2024-04-24

## 2024-04-24 NOTE — TELEPHONE ENCOUNTER
The type of nodule on her thyroid is a colloid nodule and the endocrinologist says there is no reason why she wouldn't be able to take a glt1 medication.     Just an FYI, she doesn't need anything.

## 2024-04-26 ENCOUNTER — OFFICE VISIT (OUTPATIENT)
Dept: SLEEP CENTER | Age: 64
End: 2024-04-26
Attending: INTERNAL MEDICINE
Payer: COMMERCIAL

## 2024-04-26 DIAGNOSIS — G47.33 OSA (OBSTRUCTIVE SLEEP APNEA): ICD-10-CM

## 2024-04-26 PROCEDURE — 95806 SLEEP STUDY UNATT&RESP EFFT: CPT

## 2024-04-29 ENCOUNTER — TELEPHONE (OUTPATIENT)
Dept: FAMILY MEDICINE CLINIC | Facility: CLINIC | Age: 64
End: 2024-04-29

## 2024-04-29 DIAGNOSIS — G47.33 OBSTRUCTIVE SLEEP APNEA SYNDROME: Primary | ICD-10-CM

## 2024-04-29 NOTE — TELEPHONE ENCOUNTER
RN spoke with Kaylyn at [facility] and provided her with the information that patient recently had sleep study, but it has not been updated yet.    Patient will be following up with pulmonolgy after sleep study- she needs to follow up with the sleep office and was provided with the # to Dr. Navas

## 2024-04-29 NOTE — TELEPHONE ENCOUNTER
Routing to [facility]- Looks like pt recently had sleep study on 4/26/24 but under Dr. Linn?    She is with [facility] - not sure if she is still handling the Sleep supplies still?

## 2024-04-29 NOTE — TELEPHONE ENCOUNTER
[facility] wondering with pt is continuing with Cpap therapy.   If so, they would need updated prescription for supplies.  [facility] not sure if pt needs a new sleep study.  Please advise.  Thank you!

## 2024-04-29 NOTE — TELEPHONE ENCOUNTER
I'm not sure why her sleep medicine referral closed, I thought I had requested for pulmonary consult after completion of sleep study for pulmonology to manage MEI.    She was overdue for sleep study, no orders placed as we are awaiting sleep study results and to determine if will need different device etc.      Previous notes provided for reference:    Rona Markham     MS    4/11/24  3:38 PM  Note  Spoke with Alona at [facility], advised note below.     She will relate message to Musa          4/10/24 11:20 AM  Note  Pooja is overdue for a sleep study.  She is scheduled for one on 04/26/2024     Please let [facility] know we are waiting on completion of updated sleep study and have deferred orders to sleep medicine        TE:  03/06/2024  2.) Last sleep study from 2019, should be done ever 5 years. Would she like referral to new sleep med/pulmonologist?    Referral placed for patient to complete sleep study, last sleep study in 2019. Please request that patient follows with sleep medicine for cpap orders and adjustments

## 2024-05-03 ENCOUNTER — TELEPHONE (OUTPATIENT)
Dept: FAMILY MEDICINE CLINIC | Facility: CLINIC | Age: 64
End: 2024-05-03

## 2024-05-03 NOTE — TELEPHONE ENCOUNTER
Pt see's Cristela, pt had a sleep study done and someone else's results went into her report. Pl call Pooja.

## 2024-05-06 ENCOUNTER — SLEEP STUDY (OUTPATIENT)
Facility: CLINIC | Age: 64
End: 2024-05-06
Payer: COMMERCIAL

## 2024-05-06 DIAGNOSIS — G47.30 SLEEP APNEA, UNSPECIFIED TYPE: Primary | ICD-10-CM

## 2024-05-06 PROCEDURE — 95806 SLEEP STUDY UNATT&RESP EFFT: CPT | Performed by: INTERNAL MEDICINE

## 2024-05-07 DIAGNOSIS — Z98.890 S/P ARTHROSCOPY OF SHOULDER: ICD-10-CM

## 2024-05-07 RX ORDER — MELOXICAM 15 MG/1
15 TABLET ORAL
Qty: 30 TABLET | Refills: 0 | Status: SHIPPED | OUTPATIENT
Start: 2024-05-07

## 2024-05-07 NOTE — TELEPHONE ENCOUNTER
Meloxicam  DOS: 11/14/23  Last OV: 04/12/24  Last refill date: 04/08/24     #/refills: 30/0  Upcoming appt:   Future Appointments   Date Time Provider Department Center   5/14/2024  1:00 PM [facility] MRI RM1 (1.5T WIDE) [facility] MRI [facility]   7/3/2024 11:00 AM Mary Gill Au.D [facility]   8/26/2024  8:00 AM [id number]     04/12/24  BUN  9 - 23 mg/dL 17   Creatinine  0.55 - 1.02 mg/dL 0.88     eGFR-Cr  >=60 mL/min/1.73m2 73

## 2024-05-08 ENCOUNTER — TELEPHONE (OUTPATIENT)
Facility: CLINIC | Age: 64
End: 2024-05-08

## 2024-05-08 DIAGNOSIS — G47.33 OSA (OBSTRUCTIVE SLEEP APNEA): Primary | ICD-10-CM

## 2024-05-13 ENCOUNTER — TELEPHONE (OUTPATIENT)
Dept: FAMILY MEDICINE CLINIC | Facility: CLINIC | Age: 64
End: 2024-05-13

## 2024-05-13 NOTE — TELEPHONE ENCOUNTER
Yes, I recommend that Pooja calls medical records and the sleep center as these changes would need to be made by who signed the report.

## 2024-05-13 NOTE — TELEPHONE ENCOUNTER
Pt states there is a problem with her sleep study report.  Pt states they put someone else's results on report because it is stating that she is bi-polar.  Sleep study office has not returned call. [facility] needs updated prescription to keep things going.  Please advise what to do.  Thank you!

## 2024-05-13 NOTE — TELEPHONE ENCOUNTER
Routing to CHAVA Archibald    Please see sleep consult note on 4/26/24    These changes have to come from the sleep provider correct- these changes can not be made by us

## 2024-05-14 ENCOUNTER — HOSPITAL ENCOUNTER (OUTPATIENT)
Dept: MRI IMAGING | Age: 64
Discharge: HOME | End: 2024-05-14
Attending: NURSE PRACTITIONER

## 2024-05-14 DIAGNOSIS — M54.16 LUMBAR RADICULOPATHY: ICD-10-CM

## 2024-05-14 PROCEDURE — 72148 MRI LUMBAR SPINE W/O DYE: CPT | Performed by: NURSE PRACTITIONER

## 2024-05-14 NOTE — TELEPHONE ENCOUNTER
PT RETURNED CALL - ADV PT TO REACH OUT TO SLEEP CENTER / MEDICAL RECORDS TO ADV THAT SHE NEEDS TO CONTACT THE ONES WHO HAS SIGNED REPORT

## 2024-05-20 ENCOUNTER — TELEPHONE (OUTPATIENT)
Dept: FAMILY MEDICINE CLINIC | Facility: CLINIC | Age: 64
End: 2024-05-20

## 2024-05-20 NOTE — TELEPHONE ENCOUNTER
Pt states she needs MRI results and disc sent to:    Dr. Vaughn Cancino  [address] [place] [address]  7th [place]  [address]    Please send report to:  Fax:  [phone]    Pt advised per HIPAA we can not send info without her written permission.   Pt is hoping Cristela will be able to talk to doctor directly.   Thank you!

## 2024-05-20 NOTE — TELEPHONE ENCOUNTER
Left message on voicemail/answering machine for patient to call office     Radiology to put images on disc for   Report has been faxed

## 2024-05-20 NOTE — TELEPHONE ENCOUNTER
We referred Pooja to specialist Vaughn Ramsey, so I believe we can fax this information to his office for continuity of care    MRI results should be available in care everywhere    Recommending that Pooja reaches out to the specialist's office to see if they're able to pull them up in their system    If Pooja would like, I can see if we can create a disc for her to ?    We can not mail information, this would need to be addressed by our medical records department

## 2024-05-23 ENCOUNTER — TELEPHONE (OUTPATIENT)
Facility: CLINIC | Age: 64
End: 2024-05-23

## 2024-05-23 ENCOUNTER — OFFICE VISIT (OUTPATIENT)
Facility: CLINIC | Age: 64
End: 2024-05-23

## 2024-05-23 VITALS
HEIGHT: 66 IN | SYSTOLIC BLOOD PRESSURE: 134 MMHG | BODY MASS INDEX: 36.64 KG/M2 | HEART RATE: 74 BPM | DIASTOLIC BLOOD PRESSURE: 78 MMHG | OXYGEN SATURATION: 97 % | WEIGHT: 228 LBS | RESPIRATION RATE: 16 BRPM

## 2024-05-23 DIAGNOSIS — G47.33 OBSTRUCTIVE SLEEP APNEA SYNDROME: ICD-10-CM

## 2024-05-23 DIAGNOSIS — I10 ESSENTIAL HYPERTENSION: ICD-10-CM

## 2024-05-23 DIAGNOSIS — G47.33 OSA (OBSTRUCTIVE SLEEP APNEA): Primary | ICD-10-CM

## 2024-05-23 DIAGNOSIS — E53.8 VITAMIN B12 DEFICIENCY: Primary | ICD-10-CM

## 2024-05-23 PROCEDURE — 99204 OFFICE O/P NEW MOD 45 MIN: CPT | Performed by: INTERNAL MEDICINE

## 2024-05-23 PROCEDURE — 3078F DIAST BP <80 MM HG: CPT | Performed by: INTERNAL MEDICINE

## 2024-05-23 PROCEDURE — 3075F SYST BP GE 130 - 139MM HG: CPT | Performed by: INTERNAL MEDICINE

## 2024-05-23 PROCEDURE — 3008F BODY MASS INDEX DOCD: CPT | Performed by: INTERNAL MEDICINE

## 2024-05-23 RX ORDER — AZELASTINE HYDROCHLORIDE 137 UG/1
1-2 SPRAY, METERED NASAL 2 TIMES DAILY
Qty: 1 EACH | Refills: 3 | Status: SHIPPED | OUTPATIENT
Start: 2024-05-23 | End: 2024-06-22

## 2024-05-23 NOTE — PATIENT INSTRUCTIONS
Plan:  Continue current CPAP 11 cwp  as patient is using and benefiting from CPAP use from  [facility]   Provide supplies for CPAP   Azelastine 137 mcg 1 puff twice daily as needed for congested and runny nose   Advised about weight loss   Advised against drowsy driving and to avoid alcoholic beverage and respiratory depressants as these may worsen sleep apnea      Follow up: 6  months     Juan Navas MD      Obstructive Sleep Apnea  Obstructive sleep apnea is a condition caused by air passages becoming narrowed or blocked during sleep. As a result, breathing stops for short periods. Your body wakes up enough for breathing to start again. But you don't remember it. The cycle of stopped breathing and brief awakenings can repeat dozens of times a night. This prevents the body from getting to the deeper stages of sleep that are needed for good rest.   Signs of sleep apnea include loud snoring, noisy breathing, and gasping sounds during sleep. People with sleep apnea often find they use the bathroom many times during the night. Daytime symptoms include waking up tired after a full night's sleep and waking up with headaches. They can also include feeling very sleepy or falling asleep during the day, and having problems with memory or concentration.   Risk factors for sleep apnea include:  Being overweight  Being assigned male at birth, or being in menopause  Smoking  Using alcohol or sedating medicines  Having enlarged structures in the nose or throat such as enlarged tonsils or adenoids, or extra tissue in the airway  Home care  Lifestyle changes that can help treat snoring and sleep apnea include:   If you're overweight, talk with your healthcare provider about a weight-loss plan for you.  Don't drink alcohol for 3 to 4 hours before bedtime.  Don't take sedating medicines. Ask your healthcare provider about the medicines you take.  If you smoke, talk to your provider about  ways to quit. It's important to stay away from secondhand smoke. Don't use e-cigarettes because of their harmful side effects.  Sleep on your side. This can help prevent gravity from pulling relaxed throat tissues into your breathing passages.  If you have allergies or sinus problems that block your nose, ask your provider for help.  Use positive airway pressure (PAP). Discuss with your provider the benefits of using PAP at home. And talk about the type of PAP that's best for you.  Follow-up care  Follow up with your healthcare provider, or as advised. A diagnosis of sleep apnea is made with a sleep study. Your provider can tell you more about this test.   When to get medical care  See your healthcare provider if you have daytime symptoms of sleep apnea. These include:   Waking up tired after a full night's sleep  Waking up with a headache  Feeling very sleepy or falling asleep during the day  Having problems with memory or concentration  Also talk with your provider if your partner tells you that you snore, gasp for air, or stop breathing while you sleep.   Seeing your provider is important because sleep apnea can make you more likely to have certain health problems. These include high blood pressure, heart attack, stroke, and sexual dysfunction. If you have sleep apnea, talk with your healthcare provider about the best treatments for you.   [employer] last reviewed this educational content on 5/1/2022 © 2000-2023 [employer]. All rights reserved. This information is not intended as a substitute for professional medical care. Always follow your healthcare professional's instructions.        Continuous Positive Air Pressure (CPAP)     A mask over the nose gently directs air into the throat to keep the airway open.     Continuous positive air pressure (CPAP) uses gentle air pressure to hold the airway open. CPAP is often the most effective treatment for sleep apnea. It works very well as a treatment for  adults diagnosed with obstructive sleep apnea with a lot of sleepiness. But keep in mind that it can take several adjustments before the setup is right for you.   How CPAP works  The CPAP machine  is a small portable pump that sits beside the bed. The pump sends air through a hose, which is held over your nose alone, or nose and mouth by a mask. Mild air pressure is gently pushed through your airway. The air pressure nudges sagging tissues aside. This widens the airway so you can breathe better. CPAP may be combined with other kinds of therapy for sleep apnea.   Types of air pressure treatments  There are different types of CPAP. Your doctor or CPAP technician will help you decide which type is best for you:   Basic CPAP keeps the pressure constant all night long.  A bilevel device (BiPAP) provides more pressure when you breathe in and less when you breathe out. A BiPAP machine also may be set to provide automatic breaths to maintain breathing if you stop breathing while sleeping.  An autoCPAP device automatically adjusts pressure throughout the night and in response to changes such as body position, sleep stage, and snoring.  [employer] last reviewed this educational content on 7/1/2019  © 1544-0853 [employer]. All rights reserved. This information is not intended as a substitute for professional medical care. Always follow your healthcare professional's instructions.

## 2024-05-23 NOTE — PROGRESS NOTES
This is a 64 year old female who presents with the following symptoms, risk factors, behaviors or other items associated with sleep problems.    Sleep Apnea:   nasal congestion; overweight; current CPAP use  Insomnia:  no symptoms of Insomnia  Restless Leg:  no symptoms or restless legs  Parasomnias:   no symptoms of parasomnias  Daytime Problems:  no symptoms of daytime problems    The patient's [place] Sleepiness score is 2/24.

## 2024-05-23 NOTE — PROGRESS NOTES
Pulmonary/Critical Care/Sleep Medicine    Consult Note     PCP: Luis Varela DO   Phone: [phone]   Fax: [phone]     Ref Provider: Ada Glover     Chief Complaint   Patient presents with    Consult     Sleep Consult / SS 04/2024       HPI  I had the pleasure of seeing Pooja Lilly who is a pleasant 64 year old female who presents for evaluation of MEI    The patient states that she has snored at home in past and was noted to have witnessed apnea, she has been was diagnosed with MEI in 2014 and has been on CPAP at [facility], she was seen by Dr. Linn and she has become part of [facility], so she presents for sleep evaluation.      She states bed time around 1130 PM . It takes 15 min to fall asleep and leaves bed around 630 AM. She wakes up sometimes 0 times a night when she is using her CPAP .  She is NOT sleepy and fatigued during the daytime.    She denies nightmares, sleep talking or sleep walking.  She  denies AM headaches.  She denies symptoms sleep attacks     The patient denies kicking legs at night. Denies teeth grinding.    The patient reports using CPAP daily at night at 11 cmH2O regularly throughout the night for about 7 hours and states that the  PAP machine is quiet and has a heated humidifier. Her CPAP machine 3 years old as it was replaced from the recall.     She drinks  cups of caffeine coffee daily,  caffeine glasses of tea and  caffeine cans of soda daily.       She has no pets. She has cattle.       Hx of tobacco use: She  reports that she has never smoked. She has never used smokeless tobacco.    Past Medical History:    Acquired absence of breast and absent nipple    Acquired hammer toe    Acquired spondylolisthesis    Arthritis    Benign tumor of thyroid gland    Breakage of joint prosthesis (HCC)    Carcinoma of lower-outer quadrant of right breast in female, estrogen receptor positive (HCC)    Deep vein thrombosis (HCC)    rt leg    Degeneration of lumbar intervertebral  disc    Diverticulosis of colon    2018 Moderate sigmoid diverticulosis noted on colonoscopy    Elevation of level of transaminase and lactic acid dehydrogenase (LDH)    Essential hypertension    Failed total hip arthroplasty (HCC)    H/O arthroscopic knee surgery    H/O:     Hearing impairment    Tinnitus    High cholesterol    History of adenomatous polyp of colon    History of cardiac murmur    History of hip replacement    Hyperlipidemia    Intrapelvic protrusion acetabulum, pelvic region and thigh    Metabolic syndrome X    Neuropathy    Obesity    Obstructive sleep apnea syndrome    severe in REM Using CPAP    Osteoarthritis    Osteoarthritis of knee    Pap smear for cervical cancer screening    Pap & HPV negative.    Personal history of antineoplastic chemotherapy    Last treatment 2014    PONV (postoperative nausea and vomiting)    Postmenopausal bleeding    Prediabetes    Presence of other cardiac implants and grafts    Rectal prolapse    Rectocele    S/P bunionectomy    S/P ORIF (open reduction internal fixation) fracture    Sleep apnea    CPAP    Status post left hip replacement    Strain of flexor muscle of left hip    Symptomatic menopausal or female climacteric states    Thyroid nodule    Saw Endocrinology in  at [place].  Benita Velázquez    Tubular adenoma of colon    Per Dr. Quiroga    Tubular adenoma of colon    Tubular adenoma in descending colon & rectum.    Uterine prolapse    Visual impairment    readers    Vitamin B12 deficiency      Past Surgical History:   Procedure Laterality Date    Arthroscopy of joint unlisted Right     knee          Colonoscopy & polypectomy  2018    Tubular adenoma in descending colon & rectum. Recall 5 years.    Cryocautery of cervix      in her 30s    Hc endometrial sampling w or wo endocerv sampling  2022    endometrial biopsy benign - Polypoid fragments of benign endometrium. Dr. Cira Smith    Hip  replacement surgery      Mastectomy right Right 2014    for breast cancer    Other surgical history Left 2017    Hammer Toe fixed     Reduction left Left     after the mastectomy    Tonsillectomy      Total hip replacement Right     @[facility]    Total hip replacement Right 05/16/2018    revision    Total hip replacement Left      Allergies   Allergen Reactions    Covid-19 Mrna Vacc (Moderna) [ Covid-19 Mrna Vacc (Moderna)] TINITUS    Taxotere [Docetaxel] RASH     Other reaction(s): Unknown     Current Outpatient Medications   Medication Sig Dispense Refill    Meloxicam 15 MG Oral Tab Take 1 tablet (15 mg total) by mouth daily with food. 30 tablet 0    clotrimazole-betamethasone 1-0.05 % External Cream Apply 1 Application topically 2 (two) times daily as needed. 60 g 1    cyclobenzaprine 10 MG Oral Tab Take 1 tablet (10 mg total) by mouth 3 (three) times daily as needed for Muscle spasms. 12 tablet 0    clobetasol 0.05 % External Solution Apply to scalp leave on 5-10mintues, lather, rinse out 3 times weekly as directed 25 mL 0    ezetimibe 10 MG Oral Tab Take 1 tablet (10 mg total) by mouth daily. 90 tablet 1    TURMERIC CURCUMIN OR Take 1 tablet by mouth daily.      amoxicillin 500 MG Oral Tab TAKE 4 TABLETS BY MOUTH ONE HOUR PRIOR TO DENTAL APPOINTMENT      Lancets (ONETOUCH DELICA PLUS [id number]) Does not apply Misc 1 strip by In Vitro route 2 (two) times daily.      rosuvastatin 10 MG Oral Tab Take 1 tablet (10 mg total) by mouth nightly. 90 tablet 0    Blood Glucose Monitoring Suppl (ONETOUCH VERIO REFLECT) w/Device Does not apply Kit 1 strip by In Vitro route daily.      magnesium oxide 400 MG Oral Tab Take 1 tablet (400 mg total) by mouth daily.      acidophilus-pectin Oral Cap Take 1 capsule by mouth daily.      Coenzyme Q10 (COQ10) 100 MG Oral Cap       ASPIRIN 81 OR Take by mouth.      Multiple Vitamins-Minerals (ICAPS LUTEIN & ZEAXANTHIN OR) Take 1 capsule by mouth every other day.      Calcium  Carbonate-Vitamin D (CALCIUM-VITAMIN D) 500-200 MG-UNIT Oral Tab Take 1 tablet by mouth 2 (two) times daily with meals.      omega-3 fatty acids 1000 MG Oral Cap Take 1,000 mg by mouth 2 (two) times daily.        Social History     Socioeconomic History    Marital status:    Occupational History    Occupation: [employer]   Tobacco Use    Smoking status: Never    Smokeless tobacco: Never   Vaping Use    Vaping status: Never Used   Substance and Sexual Activity    Alcohol use: Yes     Alcohol/week: 2.0 standard drinks of alcohol     Types: 2 Standard drinks or equivalent per week     Comment: social    Drug use: No    Sexual activity: Yes     Partners: Male   Other Topics Concern    History of tanning No    Outdoor occupation No    Reaction to local anesthetic No   Social History Narrative    Father is alive. Mother is alive.     The patient indicates family history of colon cancer (grandmother), heart disease (father, mother, grandfather), stroke (grandfather), coronary artery disease (father, grandfather), hypertension (mother), diabetes (mother), eye problems (grandmother). Other family history includes: m- aunt- breast cancer age 58yr    mom- a-fib, DM,HTN    dad- Cardiac stents age 69- another stent    p-gma- macular degen.         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 She has been employed for 30 years. Is working part time substitute teaching          The highest level of education achieved by Pooja was masters in Education/exercise phys    . She has pets that include 1 cat,  ciows.  no horses. Short horn cow beef cattle.    2nd in [facility]          Pooja lives alone .     She lives at home in [place].     She is  with 2 children. Has close friend.     One sexual partner.     Pooja's Taoism is Religious.         DPOAHC:  Pt states that her son, Luis Guallpa: [phone] if unable son Antonio.     Living will: has one on file. States that she doesn't want extraordinary  measures.     Full code. No long term ventilation. Ok with feeding tubes with stroke.       Social Determinants of Health      [facility]    Social Connections    Received from [facility], [facility]    Housing Stability      Immunization History   Administered Date(s) Administered    Covid-19 Vaccine Moderna 100 mcg/0.5 ml 02/20/2021    Pneumococcal (Prevnar 13) 03/30/2023    Pneumovax 23 06/26/2014, 11/11/2019    TDAP 06/20/2006, 11/09/2015    Tb Intradermal Test 08/23/2021    Zoster Vaccine Recombinant Adjuvanted (Shingrix) 01/26/2021, 07/07/2021      Family History   Problem Relation Age of Onset    Diabetes Mother     Hypertension Mother     Heart Disorder Mother         Afib    Endometriosis Mother     Heart Attack Father     Stroke Father     Diabetes Father     Colon Polyps Father     Other (Other) Sister         fibromyalgia    Other (fibroids) Sister     Crohn's Disease Brother     Colon Polyps Brother     Breast Cancer Maternal Grandmother         60s    Prostate Cancer Maternal Grandfather         70s    Breast Cancer Maternal Aunt         not sure of age at onset. Approx 65-70    Thyroid Cancer Maternal Uncle     Breast Cancer Self 54    Ovarian Cancer Neg     Colon Cancer Neg     Pancreatic Cancer Neg     Endometrial Cancer Neg         Review of Systems   Constitutional:  Negative for fatigue, fever and unexpected weight change.   HENT:  Negative for congestion, mouth sores, nosebleeds, postnasal drip, rhinorrhea, sore throat and trouble swallowing.    Eyes:  Negative for visual disturbance.   Respiratory:  Negative for apnea, cough, choking, chest tightness, shortness of breath and wheezing.    Cardiovascular:  Negative for chest pain, palpitations and leg swelling.   Gastrointestinal:  Negative for abdominal pain, constipation, diarrhea, nausea and vomiting.   Genitourinary:  Negative for  difficulty urinating.   Musculoskeletal:  Negative for arthralgias, back pain, gait problem and myalgias.   Neurological:  Negative for dizziness, weakness and headaches.   Psychiatric/Behavioral:  Negative for sleep disturbance.         Vitals:    05/23/24 0906   BP: 134/78   Pulse: 74   Resp: 16      SpO2: 97 %  Ht Readings from Last 1 Encounters:   05/23/24 5' 6\" (1.676 m)     Wt Readings from Last 1 Encounters:   05/23/24 228 lb (103.4 kg)     Body mass index is 36.8 kg/m².     Physical Exam  Constitutional:       General: She is not in acute distress.     Appearance: Normal appearance. She is obese. She is not ill-appearing or diaphoretic.   HENT:      Head: Normocephalic and atraumatic.      Nose: Nose normal. No congestion or rhinorrhea.      Mouth/Throat:      Mouth: Mucous membranes are moist.      Pharynx: Oropharynx is clear. No oropharyngeal exudate or posterior oropharyngeal erythema.      Comments: Mallampati class III palate   Eyes:      Extraocular Movements: Extraocular movements intact.      Pupils: Pupils are equal, round, and reactive to light.   Cardiovascular:      Rate and Rhythm: Normal rate.      Pulses: Normal pulses.      Heart sounds: Normal heart sounds. No murmur heard.  Pulmonary:      Effort: Pulmonary effort is normal. No respiratory distress.      Breath sounds: Normal breath sounds. No wheezing or rhonchi.   Chest:      Chest wall: No tenderness.   Abdominal:      General: Abdomen is flat. Bowel sounds are normal.      Palpations: Abdomen is soft.   Musculoskeletal:         General: Normal range of motion.   Skin:     General: Skin is warm.   Neurological:      General: No focal deficit present.      Mental Status: She is alert and oriented to person, place, and time.   Psychiatric:         Mood and Affect: Mood normal.         Behavior: Behavior normal.         Thought Content: Thought content normal.         Judgment: Judgment normal.             Labs:  Last BMP  Lab Results    Component Value Date     (H) 04/12/2024    BUN 17 04/12/2024    CREATSERUM 0.88 04/12/2024    BUNCREA 15.9 06/14/2021    ANIONGAP 5 04/12/2024    GFRAA 95 02/28/2022    GFRNAA 82 02/28/2022    CA 10.6 (H) 04/12/2024     04/12/2024    K 4.2 04/12/2024     04/12/2024    CO2 27.0 04/12/2024    OSMOCALC 291 04/12/2024      Last CBC  Lab Results   Component Value Date    WBC 7.9 04/12/2024    RBC 5.27 04/12/2024    HGB 15.3 04/12/2024    HCT 45.7 04/12/2024    MCV 86.7 04/12/2024    MCH 29.0 04/12/2024    MCHC 33.5 04/12/2024    RDW 13.3 04/12/2024    .0 04/12/2024      Last CMP  Lab Results   Component Value Date     (H) 04/12/2024    BUN 17 04/12/2024    BUNCREA 15.9 06/14/2021    CREATSERUM 0.88 04/12/2024    ANIONGAP 5 04/12/2024    GFR 72 12/16/2017    GFRNAA 82 02/28/2022    GFRAA 95 02/28/2022    CA 10.6 (H) 04/12/2024    OSMOCALC 291 04/12/2024    ALKPHO 99 04/12/2024    AST 38 (H) 04/12/2024    ALT 49 04/12/2024    BILT 0.6 04/12/2024    TP 7.9 04/12/2024    ALB 4.1 04/12/2024    GLOBULIN 3.8 04/12/2024    AGRATIO 1.1 02/27/2020     04/12/2024    K 4.2 04/12/2024     04/12/2024    CO2 27.0 04/12/2024      Last Thyroid Function  Lab Results   Component Value Date    T4F 0.8 02/28/2022    TSH 0.899 04/12/2024        Imaging:  No results found.     [facility]       Accredited by the American Academy of Sleep Medicine (AASM)     PATIENT'S NAME:        LUIS DOE  ATTENDING PHYSICIAN:   Juan Navas MD  REFERRING PHYSICIAN:   Luis Varela D.O.  PATIENT ACCOUNT #:     [id number]        LOCATION:       [facility]  MEDICAL RECORD #:      [id number]        YOB: 1960  DATE OF STUDY:         04/26/2024     SLEEP STUDY REPORT     STUDY TYPE:  Unattended sleep study.      PATIENT CHARACTERISTICS:  Age 64 years.  Gender female.     HISTORY:  The patient is a 64-year-old female with a history of obesity, hyperlipidemia, hypertension and  history of obstructive sleep apnea syndrome.  This home sleep study is performed for re-evaluation of obstructive sleep apnea syndrome.       UNATTENDED SLEEP STUDY RECORDING PARAMETERS:  The patient underwent a formal technically adequate unattended diagnostic sleep study coordinated with the [facility].  The study was performed in accordance with the AASM standard for Out of Center Sleep Testing.  The four-channel Type III HST measures the following parameters:  flow, respiratory effort, pulse, and oxygen saturation.     SCORING:  This study was scored in accordance with AASM scoring rules and Medicare rule 1B.     FINDINGS:  The flow evaluation time began at 10:06 p.m. and ended at 7:27 a.m., with a duration of 9 hours 21 minutes.  Overall apnea-hypopnea index was 9.6 events per hour.  Supine AHI was 25.5 events per hour, and non-supine AHI was 5.9 events per hour.  SpO2 salome was 76%, and the oxygen saturation was less than 88% for 39 minutes.  Loud snoring was noted during sleep study recording.  Average heart rate was 68 beats per minute, and maximum heart rate of 94 beats per minute.       IMPRESSION:  Overall apnea-hypopnea index of 9.6 events per hour is consistent with mild obstructive sleep apnea syndrome.  SpO2 saolme was 76%, and oxygen saturation was less than 88% for 39 minutes, consistent with significant nocturnal hypoxemia.      DIAGNOSIS:    1.       Obstructive sleep apnea syndrome (G47.33).  2.       Nocturnal hypoxemia.  (G47.34)     PLAN:    1.       At the request of referring physician, we will confer with the patient regarding the results of the sleep study and make treatment recommendations.  2.       The patient is a candidate for treatment with positive airway pressure (PAP) advised as first-line therapy.  Alternatives include oral appliance therapy and evaluation of upper airway by ear, nose, and throat specialist.  3.       Continuous positive airway pressure (CPAP) titration  sleep study should be completed considering significant nocturnal hypoxemia.  4.       Advise the patient to avoid alcoholic beverages and medications that are respiratory depressants and, if drowsy, use caution when driving or operating machinery.     Dictated By Juan Navas MD  d:05/02/2024 17:33:59      [place] Sleepiness Scale: (ESS) score on today's visit is 1  out of 24.     Score total of 1-6    Normal sleep   Score total of 7-8    Average sleepiness   Score total of 9-24    Abnormal (possibly pathologic) sleepiness       Impression:    Obstructive sleep apnea syndrome (OSAS): Home sleep study performed on 4/26/2024 showed Overall apnea-hypopnea index was 9.6 events per hour.  Supine AHI was 25.5 events per hour, and non-supine AHI was 5.9 events per hour.  SpO2 salome was 76%, and the oxygen saturation was less than 88% for 39 minutes.  Treated with CPAP 11 cwp.  Download data on 3/1/2024  for 90  days  shows adequate  compliance and AHI  Daytime hypersomnolence/fatigue  Obesity: Class II  ;  Body mass index is 36.8 kg/m².  Allergic Rhinitis  Hypertension   Hyperlipidemia  Prediabetes   Vitamin B12 deficiency   Hx Breast cancer s/p right mastectomy s/p implant                               Plan:  Continue current CPAP 11 cwp  as patient is using and benefiting from CPAP use from  [facility]   Provide supplies for CPAP   Azelastine 137 mcg 1 puff twice daily as needed for congested and runny nose   Continue Xyzal for allergies   Advised about weight loss   Advised against drowsy driving and to avoid alcoholic beverage and respiratory depressants as these may worsen sleep apnea      Follow up: 6  months     Thank you for allowing me to participate in your patient care.    GISELE Navas MD, FACP, FCCP, FAA - Pulmonary/Critical care/Sleep Medicine  Please contact our office if you have any questions or concerns at [phone]    Note to the patient: The 21st  Century Cures Act makes medical notes like these available to patients in the interest of transparency. However, be advised that this is a medical document. It is intended as peer to peer communication. It is written in medical language and may contain abbreviations or verbiage that are unfamiliar. It may appear blunt or direct. Medical documents are intended to carry relevant information, facts as evident, and clinical opinion of the practitioner.      Disclaimer: Components of this note were documented using voice recognition system and are subject to errors not corrected at proofreading. Contact the author of this note for any clarifications

## 2024-06-10 ENCOUNTER — TELEPHONE (OUTPATIENT)
Dept: FAMILY MEDICINE CLINIC | Facility: CLINIC | Age: 64
End: 2024-06-10

## 2024-06-10 DIAGNOSIS — E78.2 MIXED HYPERLIPIDEMIA: Primary | ICD-10-CM

## 2024-06-10 RX ORDER — ROSUVASTATIN CALCIUM 10 MG/1
10 TABLET, COATED ORAL NIGHTLY
Qty: 90 TABLET | Refills: 1 | Status: SHIPPED | OUTPATIENT
Start: 2024-06-10

## 2024-06-10 NOTE — TELEPHONE ENCOUNTER
KH Pt would like the following refilled:      rosuvastatin 10 MG Oral Tab [[id number]] (Order [id number]     Please send to:  [id number] - [place], [place] - [address] AT [address], [phone], [phone]   [address] [place] [address]   Phone: [phone] Fax: [phone]   Thank you!

## 2024-06-10 NOTE — TELEPHONE ENCOUNTER
LOV: 4/22/24  Last Refill:8/14/23 #90 2 RF    Future Appointments   Date Time Provider Department Center   7/3/2024 11:00 AM Mary Gill Au[facility]D [facility]   8/26/2024  8:00 AM Emerald Navas MD [id number]

## 2024-06-11 DIAGNOSIS — Z98.890 S/P ARTHROSCOPY OF SHOULDER: ICD-10-CM

## 2024-06-11 RX ORDER — MELOXICAM 15 MG/1
15 TABLET ORAL
Qty: 30 TABLET | Refills: 0 | Status: SHIPPED | OUTPATIENT
Start: 2024-06-11

## 2024-06-11 NOTE — TELEPHONE ENCOUNTER
Meloxicam  DOS: 11/14/23  Last OV: 04/12/24  Last refill date: 05/07/24     #/refills: 30/0  Upcoming appt:   Future Appointments   Date Time Provider Department Center   7/3/2024 11:00 AM [id number]   8/26/2024  8:00 AM [id number]     04/12/24  BUN  9 - 23 mg/dL 17   Creatinine  0.55 - 1.02 mg/dL 0.88     eGFR-Cr  >=60 mL/min/1.73m2 73

## 2024-07-09 ENCOUNTER — OFFICE VISIT (OUTPATIENT)
Dept: AUDIOLOGY | Facility: CLINIC | Age: 64
End: 2024-07-09
Payer: COMMERCIAL

## 2024-07-09 DIAGNOSIS — H90.3 SENSORINEURAL HEARING LOSS (SNHL) OF BOTH EARS: ICD-10-CM

## 2024-07-09 DIAGNOSIS — H93.13 TINNITUS OF BOTH EARS: Primary | ICD-10-CM

## 2024-07-09 PROCEDURE — 92567 TYMPANOMETRY: CPT | Performed by: AUDIOLOGIST

## 2024-07-09 PROCEDURE — 92557 COMPREHENSIVE HEARING TEST: CPT | Performed by: AUDIOLOGIST

## 2024-07-18 NOTE — TELEPHONE ENCOUNTER
Having Zoom treatment (dental) procedure scheduled for 4/27/2022 and wants to make sure the medicine she is on will not have interaction with the gel & hydrogen peroxide they use. performed by Vero Haddad MD at [facility] CARDIAC CATH LAB    EP DEVICE PROCEDURE N/A 12/14/2023    Drug stimulation performed by Vero Haddad MD at [facility] CARDIAC CATH LAB    HYSTERECTOMY (CERVIX STATUS UNKNOWN)      INVASIVE VASCULAR N/A 12/14/2023    Ultrasound guided vascular access performed by Vero Haddad MD at [facility] CARDIAC CATH LAB    ORTHOPEDIC SURGERY      Right shoulder surgery    PACEMAKER      JASMYN AND BSO (CERVIX REMOVED)           CURRENT MEDICATIONS       Previous Medications    AMLODIPINE (NORVASC) 5 MG TABLET    Take 1 tablet by mouth daily    ASCORBIC ACID (VITAMIN C) 1000 MG TABLET    Take 0.5 tablets by mouth    BIOTIN 2.5 MG CAPS    Take 2 capsules by mouth daily Taking 5000 mcq daily    BUMETANIDE (BUMEX) 2 MG TABLET    Take 1 tablet by mouth daily    CALCIUM CITRATE-VITAMIN D (CALCIUM CITRATE +D PO)    Take by mouth Calcium 400 mg & Vitamin D 500 IU    DENOSUMAB (PROLIA SC)    Inject into the skin every 6 months    ESTRADIOL (ESTRACE) 0.1 MG/GM VAGINAL CREAM    Place 2 g vaginally daily Twice weekly    GABAPENTIN (NEURONTIN) 300 MG CAPSULE    Take 2 capsules by mouth 3 times daily.    LORATADINE (CLARITIN) 10 MG TABLET    Take 1 tablet by mouth daily    MULTIPLE VITAMINS-MINERALS (PRESERVISION AREDS PO)    Take 1 tablet by mouth    MYRBETRIQ 25 MG TB24    Take 1 tablet by mouth daily Taking 50 mg daily    NEBIVOLOL (BYSTOLIC) 20 MG TABS TABLET    Take 1 tablet by mouth daily    OMEPRAZOLE (PRILOSEC) 20 MG DELAYED RELEASE CAPSULE    Take 1 capsule by mouth daily    POTASSIUM CHLORIDE (KLOR-CON M20) 20 MEQ EXTENDED RELEASE TABLET    Take 1 tablet by mouth daily    PROCHLORPERAZINE (COMPAZINE) 5 MG TABLET    Take 1 tablet by mouth every 6 hours as needed for Nausea    RIMEGEPANT SULFATE (NURTEC PO)    Take 75 mg by mouth as needed    RIVAROXABAN (XARELTO) 20 MG TABS TABLET    Take 1 tablet by mouth daily (with breakfast)    TRAMADOL (ULTRAM) 50 MG TABLET    Take 1 tablet by mouth 2 times daily as needed.

## 2024-08-26 ENCOUNTER — OFFICE VISIT (OUTPATIENT)
Dept: ORTHOPEDICS CLINIC | Facility: CLINIC | Age: 64
End: 2024-08-26
Payer: COMMERCIAL

## 2024-08-26 DIAGNOSIS — E78.2 MIXED HYPERLIPIDEMIA: ICD-10-CM

## 2024-08-26 DIAGNOSIS — Z98.890 S/P ARTHROSCOPY OF SHOULDER: Primary | ICD-10-CM

## 2024-08-26 PROCEDURE — 99213 OFFICE O/P EST LOW 20 MIN: CPT | Performed by: ORTHOPAEDIC SURGERY

## 2024-08-26 NOTE — PROGRESS NOTES
[facility] ORTHOPEDICS  [address] [place] [id number]  [phone]       Name: Pooja Lilly   MRN: [id number]  Date: 8/26/2024     REASON FOR VISIT: Fifth Post-Surgical Visit   Surgery: Left shoulder rotator cuff repair (3 tendon repair), biceps tenodesis, subacromial decompression, distal clavicle excision 11/14/2023.       INTERVAL HISTORY:  Pooja Lilly is a 64 year old female who returns after the aforementioned procedure.  The post-operative course has been unremarkable with pain well controlled and overall progress noted.     Physical therapy was started and is progressing well. No pain or limitations. She has returned to full activity. Overall, doing very well!      PE:   There were no vitals filed for this visit.  Estimated body mass index is 36.8 kg/m² as calculated from the following:    Height as of 5/23/24: 5' 6\" (1.676 m).    Weight as of 5/23/24: 228 lb.    Physical Exam  Constitutional:       Appearance: Normal appearance.   HENT:      Head: Normocephalic and atraumatic.   Eyes:      Extraocular Movements: Extraocular movements intact.   Neck:      Musculoskeletal: Normal range of motion and neck supple.   Cardiovascular:      Pulses: Normal pulses.   Pulmonary:      Effort: Pulmonary effort is normal. No respiratory distress.   Abdominal:      General: There is no distension.   Skin:     General: Skin is warm.      Capillary Refill: Capillary refill takes less than 2 seconds.      Findings: No bruising.   Neurological:      General: No focal deficit present.      Mental Status: She is alert.   Psychiatric:         Mood and Affect: Mood normal.     Examination of the left shoulder demonstrates:     Physical examination the patient is alert and oriented x3, well-developed, well-nourished, no acute distress.     5/5 strength. Full ROM.  Subtle weakness in supraspinatus and infraspinatus.    The contralateral shoulder is without limitation in range of motion  or strength, no positive provocative maneuvers.     IMPRESSION: Pooja Lilly is a 64 year old female who presents 9 months s/p Left shoulder rotator cuff repair (3 tendon repair), biceps tenodesis, subacromial decompression, distal clavicle excision 11/14/2023.       PLAN:   We had a lengthy discussion with the patient regarding the patient's findings consistent with the expected postoperative course.  She has made an excellent recovery and is back to all of her activities of daily living.  It has been a pleasure to take care of her!    All questions were answered appropriately and the patient was in agreement with the treatment plan.     FOLLOW-UP:  As needed.      Emerald Navas MD  Knee, Shoulder, & Elbow Surgery / Sports Medicine Specialist  Orthopaedic Surgery  [address] [place] [address].org  [email or web address]  t: [phone]  o: [phone]  f: [phone]     This note was dictated using Dragon software.  While it was briefly proofread prior to completion, some grammatical, spelling, and word choice errors due to dictation may still occur.

## 2024-08-27 RX ORDER — EZETIMIBE 10 MG/1
10 TABLET ORAL DAILY
Qty: 90 TABLET | Refills: 1 | Status: SHIPPED | OUTPATIENT
Start: 2024-08-27

## 2024-10-14 ENCOUNTER — OFFICE VISIT (OUTPATIENT)
Dept: FAMILY MEDICINE CLINIC | Facility: CLINIC | Age: 64
End: 2024-10-14
Payer: COMMERCIAL

## 2024-10-14 ENCOUNTER — TELEPHONE (OUTPATIENT)
Dept: FAMILY MEDICINE CLINIC | Facility: CLINIC | Age: 64
End: 2024-10-14

## 2024-10-14 VITALS
DIASTOLIC BLOOD PRESSURE: 76 MMHG | HEART RATE: 58 BPM | RESPIRATION RATE: 16 BRPM | HEIGHT: 66 IN | SYSTOLIC BLOOD PRESSURE: 138 MMHG | TEMPERATURE: 99 F | OXYGEN SATURATION: 99 % | WEIGHT: 228 LBS | BODY MASS INDEX: 36.64 KG/M2

## 2024-10-14 DIAGNOSIS — M25.662 DECREASED RANGE OF MOTION OF LEFT KNEE: ICD-10-CM

## 2024-10-14 DIAGNOSIS — M25.562 ACUTE PAIN OF LEFT KNEE: Primary | ICD-10-CM

## 2024-10-14 DIAGNOSIS — I10 ESSENTIAL HYPERTENSION: ICD-10-CM

## 2024-10-14 PROCEDURE — 3008F BODY MASS INDEX DOCD: CPT | Performed by: NURSE PRACTITIONER

## 2024-10-14 PROCEDURE — 99213 OFFICE O/P EST LOW 20 MIN: CPT | Performed by: NURSE PRACTITIONER

## 2024-10-14 PROCEDURE — 3078F DIAST BP <80 MM HG: CPT | Performed by: NURSE PRACTITIONER

## 2024-10-14 PROCEDURE — 3075F SYST BP GE 130 - 139MM HG: CPT | Performed by: NURSE PRACTITIONER

## 2024-10-14 RX ORDER — HYDROCHLOROTHIAZIDE 12.5 MG/1
12.5 CAPSULE ORAL DAILY
Qty: 90 CAPSULE | Refills: 0 | Status: SHIPPED | OUTPATIENT
Start: 2024-10-14

## 2024-10-14 NOTE — TELEPHONE ENCOUNTER
Patient advised- verbalized understanding    Future Appointments   Date Time Provider Department Center   10/14/2024  2:40 PM Cristela Shaikh APRN [facility]

## 2024-10-14 NOTE — PROGRESS NOTES
CHIEF COMPLAINT:    Chief Complaint   Patient presents with    Knee Pain     Felt pop in left knee       HISTORY OF PRESENT ILLNESS:    Pooja who has a history of failed total hip arthroplasty, breakage of joint prosthesis, and obesity presents today, October 14, 2024, for acute left knee pain.    Pain began 2 weeks ago.  Felt that she strained her leg after walking up hill, then last night she felt that her muscles were tight and tried to loosen up by walking.  Felt a painful pop behind her left knee when going up stairs.    Pain is made worse with bending knee.  Pooja has taken Meloxicam for pain, which has provided minimal relief.  Denies bruising, redness, or swelling.  Pain is rated 9 out of 10.  Pain is located to the lateral aspect of the left knee.    ALLERGIES:  Allergies[1]    CURRENT MEDICATIONS:  Current Outpatient Medications   Medication Sig Dispense Refill    EZETIMIBE 10 MG Oral Tab TAKE 1 TABLET(10 MG) BY MOUTH DAILY 90 tablet 1    Meloxicam 15 MG Oral Tab Take 1 tablet (15 mg total) by mouth daily with food. 30 tablet 0    rosuvastatin 10 MG Oral Tab Take 1 tablet (10 mg total) by mouth nightly. 90 tablet 1    clotrimazole-betamethasone 1-0.05 % External Cream Apply 1 Application topically 2 (two) times daily as needed. 60 g 1    cyclobenzaprine 10 MG Oral Tab Take 1 tablet (10 mg total) by mouth 3 (three) times daily as needed for Muscle spasms. 12 tablet 0    clobetasol 0.05 % External Solution Apply to scalp leave on 5-10mintues, lather, rinse out 3 times weekly as directed 25 mL 0    TURMERIC CURCUMIN OR Take 1 tablet by mouth daily.      amoxicillin 500 MG Oral Tab TAKE 4 TABLETS BY MOUTH ONE HOUR PRIOR TO DENTAL APPOINTMENT      Lancets (ONETOUCH DELICA PLUS [id number]) Does not apply Misc 1 strip by In Vitro route 2 (two) times daily.      Blood Glucose Monitoring Suppl (ONETOUCH VERIO REFLECT) w/Device Does not apply Kit 1 strip by In Vitro route daily.      magnesium oxide 400 MG Oral Tab  Take 1 tablet (400 mg total) by mouth daily.      acidophilus-pectin Oral Cap Take 1 capsule by mouth daily.      Coenzyme Q10 (COQ10) 100 MG Oral Cap       ASPIRIN 81 OR Take by mouth.      Multiple Vitamins-Minerals (ICAPS LUTEIN & ZEAXANTHIN OR) Take 1 capsule by mouth every other day.      Calcium Carbonate-Vitamin D (CALCIUM-VITAMIN D) 500-200 MG-UNIT Oral Tab Take 1 tablet by mouth 2 (two) times daily with meals.      omega-3 fatty acids 1000 MG Oral Cap Take 1,000 mg by mouth 2 (two) times daily.         MEDICAL HISTORY:  Past Medical History:    Acquired absence of breast and absent nipple    Acquired hammer toe    Acquired spondylolisthesis    Arthritis    Benign tumor of thyroid gland    Breakage of joint prosthesis (HCC)    Carcinoma of lower-outer quadrant of right breast in female, estrogen receptor positive (HCC)    Deep vein thrombosis (HCC)    rt leg    Degeneration of lumbar intervertebral disc    Diverticulosis of colon    2018 Moderate sigmoid diverticulosis noted on colonoscopy    Elevation of level of transaminase and lactic acid dehydrogenase (LDH)    Essential hypertension    Failed total hip arthroplasty (HCC)    H/O arthroscopic knee surgery    H/O:     Hearing impairment    Tinnitus    High cholesterol    History of adenomatous polyp of colon    History of cardiac murmur    History of hip replacement    Hyperlipidemia    Intrapelvic protrusion acetabulum, pelvic region and thigh    Metabolic syndrome X    Neuropathy    Obesity    Obstructive sleep apnea syndrome    severe in REM Using CPAP    Osteoarthritis    Osteoarthritis of knee    Pap smear for cervical cancer screening    Pap & HPV negative.    Personal history of antineoplastic chemotherapy    Last treatment 2014    PONV (postoperative nausea and vomiting)    Postmenopausal bleeding    Prediabetes    Presence of other cardiac implants and grafts    Rectal prolapse    Rectocele    S/P bunionectomy    S/P ORIF (open  reduction internal fixation) fracture    Sleep apnea    CPAP    Status post left hip replacement    Strain of flexor muscle of left hip    Symptomatic menopausal or female climacteric states    Thyroid nodule    Saw Endocrinology in  at [place].  Benita Velázquez    Tubular adenoma of colon    Per Dr. Quiroga    Tubular adenoma of colon    Tubular adenoma in descending colon & rectum.    Uterine prolapse    Visual impairment    readers    Vitamin B12 deficiency     Past Surgical History:   Procedure Laterality Date    Arthroscopy of joint unlisted Right     knee          Colonoscopy & polypectomy  2018    Tubular adenoma in descending colon & rectum. Recall 5 years.    Cryocautery of cervix      in her 30s    Hc endometrial sampling w or wo endocerv sampling  2022    endometrial biopsy benign - Polypoid fragments of benign endometrium. Dr. Cira Smith    Hip replacement surgery      Mastectomy right Right     for breast cancer    Other surgical history Left 2017    Hammer Toe fixed     Reduction left Left     after the mastectomy    Tonsillectomy      Total hip replacement Right     [facility]    Total hip replacement Right 2018    revision    Total hip replacement Left      Family History   Problem Relation Age of Onset    Diabetes Mother     Hypertension Mother     Heart Disorder Mother         Afib    Endometriosis Mother     Heart Attack Father     Stroke Father     Diabetes Father     Colon Polyps Father     Other (Other) Sister         fibromyalgia    Other (fibroids) Sister     Crohn's Disease Brother     Colon Polyps Brother     Breast Cancer Maternal Grandmother         60s    Prostate Cancer Maternal Grandfather         70s    Breast Cancer Maternal Aunt         not sure of age at onset. Approx 65-70    Thyroid Cancer Maternal Uncle     Breast Cancer Self 54    Ovarian Cancer Neg     Colon Cancer Neg     Pancreatic Cancer Neg     Endometrial  Cancer Neg      Family Status   Relation Status    Mo Alive    Fa     Sis Alive    Bro (Not Specified)    MGMA (Not Specified)    MGFA (Not Specified)    Mat Aunt (Not Specified)    Maternal Unc Alive    Self Other    NEG (Not Specified)     Social History     Socioeconomic History    Marital status:    Occupational History    Occupation: [employer]   Tobacco Use    Smoking status: Never    Smokeless tobacco: Never   Vaping Use    Vaping status: Never Used   Substance and Sexual Activity    Alcohol use: Yes     Alcohol/week: 2.0 standard drinks of alcohol     Types: 2 Standard drinks or equivalent per week     Comment: social    Drug use: No    Sexual activity: Yes     Partners: Male   Other Topics Concern    History of tanning No    Outdoor occupation No    Reaction to local anesthetic No   Social History Narrative    Father is alive. Mother is alive.     The patient indicates family history of colon cancer (grandmother), heart disease (father, mother, grandfather), stroke (grandfather), coronary artery disease (father, grandfather), hypertension (mother), diabetes (mother), eye problems (grandmother). Other family history includes: m- aunt- breast cancer age 58yr    mom- a-fib, DM,HTN    dad- Cardiac stents age 69- another stent    p-gma- macular degen.         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 She has been employed for 30 years. Is working part time substitute teaching          The highest level of education achieved by Pooja was masters in Education/exercise phys    . She has pets that include 1 cat,  ciows.  no horses. Short horn cow beef cattle.    2nd in [employer]          Pooja lives alone .     She lives at home in [place].     She is  with 2 children. Has close friend.     One sexual partner.     Pooja's [employer] is [employer].         DPOAHC:  Pt states that her son, Luis Guallpa: [phone] if unable son Antonio.     Living will: has one on file. States  that she doesn't want extraordinary measures.     Full code. No long term ventilation. Ok with feeding tubes with stroke.       Social Drivers of Health      [facility]    Social Connections    [facility]    Housing Stability       ROS:  GENERAL:  Denies recorded temperatures greater than 100.5F  RESPIRATORY:  Denies difficulty breathing  CARDIAC:  Denies chest pain with exertion    VITALS:   /64   Pulse 58   Temp 98.7 °F (37.1 °C) (Temporal)   Resp 16   Ht 5' 6\" (1.676 m)   Wt 228 lb (103.4 kg)   SpO2 99%   BMI 36.80 kg/m²     Reviewed by Cristela Shaikh MS, APRN, FNP-BC    PHYSICAL EXAM:    Constitutional:       Appears well.  Sitting upright on exam table.  Well developed, well nourished, and in no acute distress  HEENT:      Facial features symmetric. Normocephalic and atraumatic  Musculoskeletal:         Knees: No knee cap instability or tenderness of bursa.          Mild swelling of left posterior knee.  Tenderness to lateral aspect of posterior left knee.  Otherwise no edema, bruising, or erythema.      Limited ROM of left knee due to report of pain, made worse with flexion.     No laxity noted with anterior posterior drawer testing.     Gait is antalgic.  Neuro:       No focal deficits, cranial nerves grossly intact.       Movements smooth and controlled, appropriate coordination without ataxia or tremors.  Skin:     Warm and dry without jaundice or rashes.  Psychiatric:         Alert and oriented.  Calm and cooperative.  Speech is clear.     ASSESSMENT & PLAN:    1. Acute pain of left knee  - MRI KNEE, LEFT (CPT=73721); Future  - Ortho Referral - In Network  - Ortho Referral - External    2. Decreased range of motion of left knee  - MRI KNEE, LEFT (CPT=73721); Future  - Ortho Referral - In Network  - Ortho Referral - External    3. Essential hypertension  BP  slightly elevated, likely due to pain, 156/64  Improved with recheck 138/76  BP Readings from Last 3 Encounters:   10/14/24 138/76   05/23/24 134/78   04/22/24 126/80   - hydroCHLOROthiazide 12.5 MG Oral Cap; Take 1 capsule (12.5 mg total) by mouth daily.  Dispense: 90 capsule; Refill: 0       [1]   Allergies  Allergen Reactions    Covid-19 Mrna Vacc (Moderna) [ Covid-19 Mrna Vacc (Moderna)] TINITUS    Taxotere [Docetaxel] RASH     Other reaction(s): Unknown

## 2024-10-14 NOTE — TELEPHONE ENCOUNTER
Patient states she strained her calf a week ago- she has been trying to walk and ride her exercise bike. Last exercise was yesterday when she walked on the treadmill.     Today going up some stairs and she heard a pop.  She states it is a little swollen. She has applied ice  and took a meloxicam that she had for her back pain.    She states that if she sits with her knees bent it feels okay- but if she tries to straighten it out it is painful.    Denies redness- but she can not see behind her knee.    Patient reports she is a  at a school and believes it to be a strain/sprain so she feels ice and rest would be best.      Should patient be seen in office?

## 2024-10-14 NOTE — TELEPHONE ENCOUNTER
Worried about ligament/tendon tear due to audible pop    I have an opening this afternoon?    Recommending she reaches out to ortho specialist for appointment to help us determine next steps - considering ultrasound vs mri

## 2024-10-14 NOTE — TELEPHONE ENCOUNTER
Pt calling-  felt something pop while going up the stairs today. States she strained left calf a few days ago. Pt would like to discuss.

## 2024-10-15 ENCOUNTER — HOSPITAL ENCOUNTER (OUTPATIENT)
Dept: MRI IMAGING | Age: 64
Discharge: HOME | End: 2024-10-15
Attending: NURSE PRACTITIONER
Payer: COMMERCIAL

## 2024-10-15 DIAGNOSIS — M25.662 DECREASED RANGE OF MOTION OF LEFT KNEE: ICD-10-CM

## 2024-10-15 DIAGNOSIS — M25.562 ACUTE PAIN OF LEFT KNEE: ICD-10-CM

## 2024-10-15 PROCEDURE — 73721 MRI JNT OF LWR EXTRE W/O DYE: CPT | Performed by: NURSE PRACTITIONER

## 2024-10-16 PROBLEM — M71.22 SYNOVIAL CYST OF LEFT POPLITEAL SPACE: Status: ACTIVE | Noted: 2024-10-16

## 2024-11-21 DIAGNOSIS — E78.2 MIXED HYPERLIPIDEMIA: ICD-10-CM

## 2024-11-21 RX ORDER — ROSUVASTATIN CALCIUM 10 MG/1
10 TABLET, COATED ORAL NIGHTLY
Qty: 90 TABLET | Refills: 1 | Status: SHIPPED | OUTPATIENT
Start: 2024-11-21

## 2024-11-21 NOTE — TELEPHONE ENCOUNTER
Cholesterol Medication Protocol Yaopsl9311/21/2024 05:46 AM   Protocol Details ALT < 80    ALT resulted within past year    Lipid panel within past 12 months    In person appointment or virtual visit in the past 12 mos or appointment in next 3 mos

## 2025-01-12 DIAGNOSIS — I10 ESSENTIAL HYPERTENSION: ICD-10-CM

## 2025-01-13 RX ORDER — HYDROCHLOROTHIAZIDE 12.5 MG/1
12.5 CAPSULE ORAL DAILY
Qty: 90 CAPSULE | Refills: 1 | Status: SHIPPED | OUTPATIENT
Start: 2025-01-13

## 2025-01-13 NOTE — TELEPHONE ENCOUNTER
Sent per passed protcol:      Hypertension Medications Protocol Rxdbkb5601/12/2025 04:22 PM   Protocol Details CMP or BMP in past 12 months    Last BP reading less than 140/90    In person appointment or virtual visit in the past 12 mos or appointment in next 3 mos    EGFRCR or GFRNAA > 50    Medication is active on med list

## 2025-02-07 DIAGNOSIS — E78.2 MIXED HYPERLIPIDEMIA: ICD-10-CM

## 2025-02-07 RX ORDER — EZETIMIBE 10 MG/1
10 TABLET ORAL DAILY
Qty: 90 TABLET | Refills: 1 | Status: SHIPPED | OUTPATIENT
Start: 2025-02-07

## 2025-02-07 NOTE — TELEPHONE ENCOUNTER
Cholesterol Medication Protocol Dshbgj3802/07/2025 05:44 AM   Protocol Details ALT < 80    ALT resulted within past year    Lipid panel within past 12 months    In person appointment or virtual visit in the past 12 mos or appointment in next 3 mos    Medication is active on med list

## 2025-02-10 RX ORDER — AZELASTINE 1 MG/ML
SPRAY, METERED NASAL
Qty: 30 ML | Refills: 3 | Status: SHIPPED | OUTPATIENT
Start: 2025-02-10

## 2025-02-10 NOTE — TELEPHONE ENCOUNTER
Received request for:  Azelastine    Patient last seen by: Dr. Navas on 5-23-24    Has scheduled appointment: none.  Appt cancelled on 8-21-24    Physician recommendation:Azelastine 137 mcg 1 puff twice daily as needed for congested and runny nose and follow up in 6 months.    Refill for Azelastine sent to pharmacy.      Kosmixt message sent to pt to schedule a follow up appt.

## 2025-03-31 ENCOUNTER — TELEPHONE (OUTPATIENT)
Dept: FAMILY MEDICINE CLINIC | Facility: CLINIC | Age: 65
End: 2025-03-31

## 2025-03-31 NOTE — TELEPHONE ENCOUNTER
Patient calling to inform office she is going to be switching back to former PCP Dr. Linn now that she is of Medicare age.    Received request via: Pharmacy    Was the patient seen in the last year in this department? Yes    Does the patient have an active prescription (recently filled or refills available) for medication(s) requested? No    Does the patient have USP Plus and need 100 day supply (blood pressure, diabetes and cholesterol meds only)? Patient does not have SCP

## 2025-05-08 DIAGNOSIS — I10 ESSENTIAL HYPERTENSION: ICD-10-CM

## 2025-05-08 DIAGNOSIS — E78.2 MIXED HYPERLIPIDEMIA: ICD-10-CM

## 2025-05-08 NOTE — TELEPHONE ENCOUNTER
ROSUVASTATIN 10MG TABLETS   Last refill: 11/21/2024, for #90, 1 refill    HYDROCHLOROTHIAZIDE 12.5MG CAPSULES   Last refill: 1/1/2025, for #90, 1 refill    Pt failed refill protocol for the following reasons:    Cholesterol Medication Protocol Jeepzo9005/08/2025 06:29 AM   Protocol Details ALT < 80    ALT resulted within past year    Lipid panel within past 12 months    In person appointment or virtual visit in the past 12 mos or appointment in next 3 mos    Medication is active on med list        Hypertension Medications Protocol Lduisb0505/08/2025 06:29 AM   Protocol Details CMP or BMP in past 12 months    EGFRCR or GFRNAA > 50    Last BP reading less than 140/90    In person appointment or virtual visit in the past 12 mos or appointment in next 3 mos    Medication is active on med list   Last appt: 10/14/2024  Next appt: No future appointments.      Forward to Cristela LARSEN,, please advise on refills. Thank you.

## 2025-05-09 RX ORDER — HYDROCHLOROTHIAZIDE 12.5 MG/1
12.5 CAPSULE ORAL DAILY
Qty: 90 CAPSULE | Refills: 0 | Status: SHIPPED | OUTPATIENT
Start: 2025-05-09

## 2025-05-09 RX ORDER — ROSUVASTATIN CALCIUM 10 MG/1
10 TABLET, COATED ORAL NIGHTLY
Qty: 90 TABLET | Refills: 0 | Status: SHIPPED | OUTPATIENT
Start: 2025-05-09

## 2025-07-01 ENCOUNTER — PATIENT OUTREACH (OUTPATIENT)
Dept: CASE MANAGEMENT | Age: 65
End: 2025-07-01

## 2025-07-01 NOTE — PROCEDURES
The office order for PCP removal request is Approved and finalized on July 1, 2025.    Removed Luis Varela MD as the patient's Primary Care Physician

## 2025-08-06 DIAGNOSIS — E78.2 MIXED HYPERLIPIDEMIA: ICD-10-CM

## 2025-08-07 RX ORDER — ROSUVASTATIN CALCIUM 10 MG/1
10 TABLET, COATED ORAL NIGHTLY
Qty: 90 TABLET | Refills: 0 | OUTPATIENT
Start: 2025-08-07

## 2025-08-07 RX ORDER — EZETIMIBE 10 MG/1
10 TABLET ORAL DAILY
Qty: 90 TABLET | Refills: 1 | OUTPATIENT
Start: 2025-08-07

## (undated) DIAGNOSIS — R74.8 ELEVATED LIVER ENZYMES: Primary | ICD-10-CM

## (undated) DEVICE — 12 ML SYRINGE LUER-LOCK TIP: Brand: MONOJECT

## (undated) DEVICE — DRAPE SRG 70X60IN SPLT U IMPRV

## (undated) DEVICE — SUPER TURBOVAC 90 IFS: Brand: COBLATION

## (undated) DEVICE — ADHESIVE SKIN TOP FOR WND CLSR DERMBND ADV

## (undated) DEVICE — STERILE POLYISOPRENE POWDER-FREE SURGICAL GLOVES WITH EMOLLIENT COATING: Brand: PROTEXIS

## (undated) DEVICE — SOLUTION RUBBING 4OZ 70% ISO ALC CLR

## (undated) DEVICE — GAMMEX® PI HYBRID SIZE 6.5, STERILE POWDER-FREE SURGICAL GLOVE, POLYISOPRENE AND NEOPRENE BLEND: Brand: GAMMEX

## (undated) DEVICE — STERILE POLYISOPRENE POWDER-FREE SURGICAL GLOVES: Brand: PROTEXIS

## (undated) DEVICE — GAUZE SPONGES,12 PLY: Brand: CURITY

## (undated) DEVICE — KIT BEACH CHR FOAM W/ SUPP ARM DRP

## (undated) DEVICE — SHOULDER ARTHROSCOPY CDS-LF: Brand: MEDLINE INDUSTRIES, INC.

## (undated) DEVICE — CONTAINER SPEC STR 4OZ GRY LID

## (undated) DEVICE — PACK CDS TOTAL HIP

## (undated) DEVICE — BLADE ELECTRODE: Brand: EDGE

## (undated) DEVICE — Device

## (undated) DEVICE — ANTIBACTERIAL VIOLET BRAIDED (POLYGLACTIN 910), SYNTHETIC ABSORBABLE SUTURE: Brand: COATED VICRYL

## (undated) DEVICE — SPONGE GZ 4XL4IN 100% COT 12 PLY TYP VII WVN

## (undated) DEVICE — 5.0MM CORNERSTONE

## (undated) DEVICE — SOL  .9 3000ML

## (undated) DEVICE — [RESECTOR CUTTER, ARTHROSCOPIC SHAVER BLADE,  DO NOT RESTERILIZE,  DO NOT USE IF PACKAGE IS DAMAGED,  KEEP DRY,  KEEP AWAY FROM SUNLIGHT]: Brand: FORMULA

## (undated) DEVICE — SOL  .9 1000ML BTL

## (undated) DEVICE — 1010 S-DRAPE TOWEL DRAPE 10/BX: Brand: STERI-DRAPE™

## (undated) DEVICE — #15 STERILE STAINLESS BLADE: Brand: STERILE STAINLESS BLADES

## (undated) DEVICE — PASSER SUT 90DEG STR NIT WIRE LOOP 2 FBRSTCK

## (undated) DEVICE — 40765 BEACH CHAIR HEAD RESTRAINT: Brand: 40765 BEACH CHAIR HEAD RESTRAINT

## (undated) DEVICE — SUTURE MONOCRYL 3-0 Y936H

## (undated) DEVICE — TOWEL SURG SM W12XL18IN CLR PLAS TEAR RESIST

## (undated) DEVICE — PAD SACRAL SPAN AID

## (undated) DEVICE — SLEEVE COMPR M KNEE LEN SGL USE KENDALL SCD

## (undated) DEVICE — SPINOCAN® 18 GA. X 3-1/2 IN. (90 MM) SPINAL NEEDLE: Brand: SPINOCAN®

## (undated) DEVICE — PEN: MARKING STD PT 100/CS: Brand: MEDICAL ACTION INDUSTRIES

## (undated) DEVICE — DERMABOND LIQUID ADHESIVE

## (undated) DEVICE — HOOD: Brand: FLYTE

## (undated) DEVICE — CANNULA ARTHSCP L7CM ID8.25MM TRNSLUC THRD

## (undated) DEVICE — GAMMEX® PI HYBRID SIZE 8, STERILE POWDER-FREE SURGICAL GLOVE, POLYISOPRENE AND NEOPRENE BLEND: Brand: GAMMEX

## (undated) DEVICE — SUTURE MCRYL SZ 3-0 L27IN ABSRB UD L19MM PS-2

## (undated) DEVICE — T5 HOOD WITH PEEL AWAY FACE SHIELD

## (undated) DEVICE — NEEDLE HPO 18GA 1.5IN ECLPS

## (undated) DEVICE — BATTERY

## (undated) DEVICE — 3M™ IOBAN™ 2 ANTIMICROBIAL INCISE DRAPE 6650EZ: Brand: IOBAN™ 2

## (undated) DEVICE — PEEK SWIVELOCK C,4.75X19.1MM
Type: IMPLANTABLE DEVICE | Site: SHOULDER | Status: NON-FUNCTIONAL
Brand: ARTHREX®

## (undated) DEVICE — DRESSING AQUACEL AG SP 3.5X10

## (undated) DEVICE — PROXIMATE SKIN STAPLERS (35 WIDE) CONTAINS 35 STAINLESS STEEL STAPLES (FIXED HEAD): Brand: PROXIMATE

## (undated) DEVICE — TOWEL OR BLU 16X26 STRL

## (undated) DEVICE — COVER TABLE BACK PADDED HVY DU

## (undated) DEVICE — SYRINGE MNJCT 35ML LF STRL LL

## (undated) DEVICE — LIGHT HANDLE

## (undated) DEVICE — 3M™ STERI-STRIP™ REINFORCED ADHESIVE SKIN CLOSURES, R1547, 1/2 IN X 4 IN (12 MM X 100 MM), 6 STRIPS/ENVELOPE: Brand: 3M™ STERI-STRIP™

## (undated) DEVICE — SUTURE FIBERLOOP SZ 2 L20IN NONABSORBABLE BLU

## (undated) DEVICE — COVER,MAYO STAND,STERILE: Brand: MEDLINE

## (undated) DEVICE — SUTURE MCRYL SZ 2-0 L27IN ABSRB UD SH L26MM

## (undated) DEVICE — DRAPE,U/SHT,SPLIT,FILM,60X84,STERILE: Brand: MEDLINE

## (undated) DEVICE — GAMMEX® PI HYBRID SIZE 7.5, STERILE POWDER-FREE SURGICAL GLOVE, POLYISOPRENE AND NEOPRENE BLEND: Brand: GAMMEX

## (undated) DEVICE — ANTIBACTERIAL UNDYED BRAIDED (POLYGLACTIN 910), SYNTHETIC ABSORBABLE SUTURE: Brand: COATED VICRYL

## (undated) DEVICE — CHLORAPREP 26ML APPLICATOR

## (undated) DEVICE — TUBING PMP L16FT DISP

## (undated) DEVICE — COVER LT HNDL PLAS RIG 2 PER PK

## (undated) DEVICE — [TOMCAT CUTTER, ARTHROSCOPIC SHAVER BLADE,  DO NOT RESTERILIZE,  DO NOT USE IF PACKAGE IS DAMAGED,  KEEP DRY,  KEEP AWAY FROM SUNLIGHT]: Brand: FORMULA

## (undated) DEVICE — DRESSING AQUACEL AG SP 3.5X14

## (undated) DEVICE — STOCKINETTE SUR W9XL48IN IMPERV FOR HANDLING

## (undated) DEVICE — PILLOW FX 56X38X15CM MED HIP

## (undated) DEVICE — GOWN,SIRUS,FABRIC-REINFORCED,X-LARGE: Brand: MEDLINE

## (undated) DEVICE — SOLUTION IRRIG 3000ML 0.9% NACL FLX CONT

## (undated) DEVICE — MEDI-VAC NON-CONDUCTIVE SUCTION TUBING: Brand: CARDINAL HEALTH

## (undated) DEVICE — 3M™ IOBAN™ 2 ANTIMICROBIAL INCISE DRAPE 6648EZ: Brand: IOBAN™ 2

## (undated) DEVICE — 3M™ TEGADERM™ +PAD FILM DRESSING WITH NON-ADHERENT PAD, 3584, 2-3/8 IN X 4 IN (6 CM X 10 CM), 50/CAR, 4 CAR/CS: Brand: 3M™ TEGADERM™

## (undated) DEVICE — SHEET,DRAPE,70X100,STERILE: Brand: MEDLINE

## (undated) NOTE — MR AVS SNAPSHOT
[facility] [address]  [address] [id number]  [phone]               Thank you for choosing us for your health care visit with [facility].   We are glad to serve you and happy to provide you with this summary of Sign up for [employer], your secure online medical record. [employer] will allow you to access patient instructions from your recent visit,  view other health information, and more. To sign up or find more information, go to [email or web address] and cl

## (undated) NOTE — MR AVS SNAPSHOT
[facility] [address]  [place] [address] [id number] [phone]               Thank you for choosing us for your health care visit with Alberto Kenney MD.  We are glad to serve you and happy to provide you with this summary of yo What changed:  Another medication with the same name was removed. Continue taking this medication, and follow the directions you see here. atenolol 25 MG Tabs   What changed:  Another medication with the same name was removed.  Continue taking thi Your unique [employer] Access Code: [email or web address]  Expires: 3/31/2017  9:18 AM    If you have questions, you can call [phone] to talk to our [facility] Staff. Remember, [employer] is NOT to be used for urgent needs. For medical emergencies, dial 911.

## (undated) NOTE — MR AVS SNAPSHOT
[facility] [address]  [place] [address] [address] [id number]  [phone]               Thank you for choosing us for your health care visit with [facility].   We are glad to serve you and happy to provide you with this summary of atenolol 25 MG Tabs   TAKE 1 TABLET BY MOUTH EVERY DAY   Commonly known as:  TENORMIN           calcium-vitamin D 500-200 MG-UNIT Tabs   Take 1 tablet by mouth daily.            cyanocobalamin 1000 MCG/ML Soln   Inject 1 mL (1,000 mcg total) into the muscl

## (undated) NOTE — LETTER
[facility] Department of OB/GYN  After Care Instructions for Endometrial Biopsy      Biopsy Results   You will receive a phone call with your biopsy results in 7 business days. If you have not received your results in 7 days, please contact our office. The results of your biopsy will determine if further treatment will be necessary. Bleeding   You may have some light bleeding or blackish clumpy discharge for several days after your biopsy. Restrictions    You should avoid intercourse or tampon use for 1 day after your biopsy. Pain    You may experience mild menstrual cramping after your biopsy. You may use Ibuprofen, Aleve or Tylenol to relieve your discomfort. If you experience severe or persistent pain contact our office. If you have any additional questions, please call us at [phone].

## (undated) NOTE — MR AVS SNAPSHOT
[facility] [address]  [place] [address] [address] [id number]  [phone]               Thank you for choosing us for your health care visit with Beata Poon MD.  We are glad to serve you and happy to provide you with this summary of yo Restart Lovenox for day after surgery and then return to aspirin dialy.         Allergies as of Jun 22, 2017     Docetaxel Rash                Today's Vital Signs     BP Pulse Temp Height Weight BMI    114/70 mmHg 80 99.1 °F (37.3 °C) (Tympanic) 64.5\" 237 Support Staff. Remember, MyChart is NOT to be used for urgent needs. For medical emergencies, dial 911.            Visit [facility] online at  [email or web address]

## (undated) NOTE — MR AVS SNAPSHOT
[facility] [address]  [place] [address] [address] [id number] [phone]               Thank you for choosing us for your health care visit with Oswaldo Hickey MD.  We are glad to serve you and happy to provide you with this summary of yo Commonly known as:  TENORMIN           calcium-vitamin D 500-200 MG-UNIT Tabs   Take 1 tablet by mouth daily. cyanocobalamin 1000 MCG/ML Soln   Inject 1 mL (1,000 mcg total) into the muscle every 30 (thirty) days.    Commonly known as:  VITAMIN B1

## (undated) NOTE — LETTER
Pooja Lilly   [address] [place] [address]           Dear Pooja Lilly     Our records indicate that you have outstanding lab work and or testing that was ordered for you and has not yet been completed:  Lab Frequency Next Occurrence   Hemoglobin A1C [E] Once 10/12/2023      To provide you with the best possible care, please complete these orders at your earliest convenience. If you have recently completed these orders please disregard this letter.     If you have any questions please call the office at [phone].     Thank you,     [facility]

## (undated) NOTE — MR AVS SNAPSHOT
[facility] [address]  [place] [address] [address] [id number] [phone]               Thank you for choosing us for your health care visit with [facility].   We are glad to serve you and happy to provide you with this summary of These medications were sent to [address], [address] [facility], [phone], [phone] [address], [address] [place] [address]     Phone:  [phone]    - cyanocobalamin 1000 MCG/ML So

## (undated) NOTE — LETTER
11/03/20        John Santiago  [address] [place] [address]      Dear Rosa Magallon records indicate that you have outstanding lab work and or testing that was ordered for you and has not yet been completed:  Orders Placed This Encounter

## (undated) NOTE — MR AVS SNAPSHOT
After Visit Summary   1/13/2020    Darnell Means    MRN: [id number]           Visit Information     Date & Time  1/13/2020  4:00 PM Provider  Benigno Millard  [facility] Dept.  Phone  [phone] AEROBIC BACTERIAL CULTURE [[id number] CUSTOM]     BREAST AND PELVIC EXAM [ [facility]]     HPV HIGH RISK , THIN PREP COLLECTION [[id number] CUSTOM]     THINPREP PAP SMEAR B [[id number] CUSTOM]     THINPREP PAP SMEAR ONLY [[id number] CUSTOM]     Future Labs/Procedures $35*       SAME DAY APPOINTMENTS   Available at primary care offices    AFTER HOURS CARE  [place]       OFFICE VISIT   Primary Care Providers  Treatment for mild illness or injury that does not require immediate attention.      Average cost  $70*      EXPRE

## (undated) NOTE — MR AVS SNAPSHOT
After Visit Summary   11/11/2019    Dhaval Aguilera    MRN: [id number]           Visit Information     Date & Time  11/11/2019  3:40 PM Provider  Griffin Latif  [facility]  Phone  [phone] status, unspecified site of breast   [2315543]  -  Primary  Preoperative clearance   [907762]    MEI (obstructive sleep apnea)   [145902]    Benign essential hypertension   [048619]             We Ordered the Following     Normal Orders This Visit    CBC W Treatment for mild  illness or injury that does  not require immediate attention.           Average cost  $70*       VIDEO VISITS  Visit face-to-face with a [facility] physician or JEREMIE  using your mobile device or computer   using [employer]